# Patient Record
Sex: FEMALE | Race: WHITE | Employment: OTHER | URBAN - METROPOLITAN AREA
[De-identification: names, ages, dates, MRNs, and addresses within clinical notes are randomized per-mention and may not be internally consistent; named-entity substitution may affect disease eponyms.]

---

## 2017-04-20 ENCOUNTER — HOSPITAL ENCOUNTER (OUTPATIENT)
Dept: GENERAL RADIOLOGY | Age: 77
Discharge: HOME | End: 2017-04-20
Attending: INTERNAL MEDICINE | Admitting: INTERNAL MEDICINE

## 2017-04-20 DIAGNOSIS — E78.5 ELEVATED LIPIDS: ICD-10-CM

## 2017-04-20 DIAGNOSIS — I10 ESSENTIAL HYPERTENSION: ICD-10-CM

## 2017-04-20 DIAGNOSIS — E03.9 HYPOTHYROIDISM, UNSPECIFIED TYPE: ICD-10-CM

## 2017-04-20 LAB
A/G RATIO: 1.4 (ref 1.1–2.2)
ALBUMIN SERPL-MCNC: 3.9 G/DL (ref 3.4–5)
ALP BLD-CCNC: 91 U/L (ref 40–129)
ALT SERPL-CCNC: 39 U/L (ref 10–40)
ANION GAP SERPL CALCULATED.3IONS-SCNC: 17 MMOL/L (ref 3–16)
AST SERPL-CCNC: 41 U/L (ref 15–37)
BILIRUB SERPL-MCNC: 0.6 MG/DL (ref 0–1)
BUN BLDV-MCNC: 27 MG/DL (ref 7–20)
CALCIUM SERPL-MCNC: 9.5 MG/DL (ref 8.3–10.6)
CHLORIDE BLD-SCNC: 102 MMOL/L (ref 99–110)
CHOLESTEROL, TOTAL: 134 MG/DL (ref 0–199)
CO2: 22 MMOL/L (ref 21–32)
CREAT SERPL-MCNC: 0.8 MG/DL (ref 0.6–1.2)
GFR AFRICAN AMERICAN: >60
GFR NON-AFRICAN AMERICAN: >60
GLOBULIN: 2.8 G/DL
GLUCOSE BLD-MCNC: 106 MG/DL (ref 70–99)
HDLC SERPL-MCNC: 44 MG/DL (ref 40–60)
LDL CHOLESTEROL CALCULATED: 49 MG/DL
POTASSIUM SERPL-SCNC: 4.9 MMOL/L (ref 3.5–5.1)
SODIUM BLD-SCNC: 141 MMOL/L (ref 136–145)
T4 FREE: 1.1 NG/DL (ref 0.9–1.8)
TOTAL PROTEIN: 6.7 G/DL (ref 6.4–8.2)
TRIGL SERPL-MCNC: 205 MG/DL (ref 0–150)
TSH SERPL DL<=0.05 MIU/L-ACNC: 1.12 UIU/ML (ref 0.27–4.2)
VLDLC SERPL CALC-MCNC: 41 MG/DL

## 2017-04-24 ENCOUNTER — OFFICE VISIT (OUTPATIENT)
Dept: INTERNAL MEDICINE CLINIC | Age: 77
End: 2017-04-24

## 2017-04-24 VITALS
HEIGHT: 63 IN | BODY MASS INDEX: 33.66 KG/M2 | OXYGEN SATURATION: 96 % | SYSTOLIC BLOOD PRESSURE: 132 MMHG | WEIGHT: 190 LBS | HEART RATE: 58 BPM | DIASTOLIC BLOOD PRESSURE: 72 MMHG

## 2017-04-24 DIAGNOSIS — E78.5 ELEVATED LIPIDS: ICD-10-CM

## 2017-04-24 DIAGNOSIS — E03.9 HYPOTHYROIDISM, UNSPECIFIED TYPE: ICD-10-CM

## 2017-04-24 DIAGNOSIS — R73.01 IFG (IMPAIRED FASTING GLUCOSE): ICD-10-CM

## 2017-04-24 DIAGNOSIS — R09.89 CAROTID BRUIT, UNSPECIFIED LATERALITY: ICD-10-CM

## 2017-04-24 DIAGNOSIS — I10 ESSENTIAL HYPERTENSION: Primary | ICD-10-CM

## 2017-04-24 PROCEDURE — 1036F TOBACCO NON-USER: CPT | Performed by: INTERNAL MEDICINE

## 2017-04-24 PROCEDURE — 99214 OFFICE O/P EST MOD 30 MIN: CPT | Performed by: INTERNAL MEDICINE

## 2017-04-24 PROCEDURE — G8417 CALC BMI ABV UP PARAM F/U: HCPCS | Performed by: INTERNAL MEDICINE

## 2017-04-24 PROCEDURE — 4040F PNEUMOC VAC/ADMIN/RCVD: CPT | Performed by: INTERNAL MEDICINE

## 2017-04-24 PROCEDURE — 1090F PRES/ABSN URINE INCON ASSESS: CPT | Performed by: INTERNAL MEDICINE

## 2017-04-24 PROCEDURE — G8427 DOCREV CUR MEDS BY ELIG CLIN: HCPCS | Performed by: INTERNAL MEDICINE

## 2017-04-24 PROCEDURE — G8399 PT W/DXA RESULTS DOCUMENT: HCPCS | Performed by: INTERNAL MEDICINE

## 2017-04-24 PROCEDURE — 1123F ACP DISCUSS/DSCN MKR DOCD: CPT | Performed by: INTERNAL MEDICINE

## 2017-04-27 ENCOUNTER — HOSPITAL ENCOUNTER (OUTPATIENT)
Dept: VASCULAR LAB | Age: 77
Discharge: HOME | End: 2017-04-27
Attending: INTERNAL MEDICINE | Admitting: INTERNAL MEDICINE

## 2017-04-27 DIAGNOSIS — R09.89 OTHER SPECIFIED SYMPTOMS AND SIGNS INVOLVING THE CIRCULATORY AND RESPIRATORY SYSTEMS: ICD-10-CM

## 2017-05-15 RX ORDER — IRBESARTAN 300 MG/1
300 TABLET ORAL DAILY
Qty: 90 TABLET | Refills: 3 | Status: SHIPPED | OUTPATIENT
Start: 2017-05-15 | End: 2018-05-25 | Stop reason: SDUPTHER

## 2017-08-15 ENCOUNTER — TELEPHONE (OUTPATIENT)
Dept: INTERNAL MEDICINE CLINIC | Age: 77
End: 2017-08-15

## 2017-08-15 RX ORDER — METOPROLOL SUCCINATE 50 MG/1
50 TABLET, EXTENDED RELEASE ORAL DAILY
Qty: 30 TABLET | Refills: 5 | Status: SHIPPED | OUTPATIENT
Start: 2017-08-15 | End: 2018-02-13 | Stop reason: SDUPTHER

## 2017-08-23 ENCOUNTER — HOSPITAL ENCOUNTER (OUTPATIENT)
Dept: MAMMOGRAPHY | Age: 77
Discharge: HOME | End: 2017-08-23
Attending: INTERNAL MEDICINE | Admitting: INTERNAL MEDICINE

## 2017-08-23 DIAGNOSIS — Z12.31 ENCOUNTER FOR SCREENING MAMMOGRAM FOR BREAST CANCER: ICD-10-CM

## 2017-08-31 ENCOUNTER — HOSPITAL ENCOUNTER (OUTPATIENT)
Dept: MAMMOGRAPHY | Age: 77
Discharge: HOME | End: 2017-08-31
Attending: INTERNAL MEDICINE | Admitting: INTERNAL MEDICINE

## 2017-08-31 ENCOUNTER — TELEPHONE (OUTPATIENT)
Dept: INTERNAL MEDICINE CLINIC | Age: 77
End: 2017-08-31

## 2017-08-31 DIAGNOSIS — R92.8 ABNORMAL MAMMOGRAM: Primary | ICD-10-CM

## 2017-08-31 DIAGNOSIS — R92.8 ABNORMAL MAMMOGRAM: ICD-10-CM

## 2017-08-31 DIAGNOSIS — R92.8 ABNORMAL MAMMOGRAM, UNSPECIFIED: ICD-10-CM

## 2017-09-08 ENCOUNTER — HOSPITAL ENCOUNTER (OUTPATIENT)
Dept: MAMMOGRAPHY | Age: 77
Discharge: HOME | End: 2017-09-08
Attending: INTERNAL MEDICINE | Admitting: INTERNAL MEDICINE

## 2017-09-08 ENCOUNTER — NURSE ONLY (OUTPATIENT)
Dept: MAMMOGRAPHY | Age: 77
End: 2017-09-08

## 2017-09-08 DIAGNOSIS — R92.8 ABNORMAL MAMMOGRAM, UNSPECIFIED: ICD-10-CM

## 2017-09-11 ENCOUNTER — TELEPHONE (OUTPATIENT)
Dept: INTERNAL MEDICINE CLINIC | Age: 77
End: 2017-09-11

## 2017-09-22 LAB
ANION GAP SERPL CALCULATED.3IONS-SCNC: 17 MMOL/L (ref 3–16)
BUN BLDV-MCNC: 30 MG/DL (ref 7–20)
CALCIUM SERPL-MCNC: 9.3 MG/DL (ref 8.3–10.6)
CHLORIDE BLD-SCNC: 107 MMOL/L (ref 99–110)
CO2: 19 MMOL/L (ref 21–32)
CREAT SERPL-MCNC: 0.9 MG/DL (ref 0.6–1.2)
GFR AFRICAN AMERICAN: >60
GFR NON-AFRICAN AMERICAN: >60
GLUCOSE BLD-MCNC: 107 MG/DL (ref 70–99)
POTASSIUM SERPL-SCNC: 4.7 MMOL/L (ref 3.5–5.1)
SODIUM BLD-SCNC: 143 MMOL/L (ref 136–145)

## 2017-09-23 LAB
EKG ATRIAL RATE: 70 BPM
EKG DIAGNOSIS: NORMAL
EKG P AXIS: -5 DEGREES
EKG P-R INTERVAL: 152 MS
EKG Q-T INTERVAL: 382 MS
EKG QRS DURATION: 90 MS
EKG QTC CALCULATION (BAZETT): 412 MS
EKG R AXIS: 0 DEGREES
EKG T AXIS: 14 DEGREES
EKG VENTRICULAR RATE: 70 BPM

## 2017-09-23 PROCEDURE — 93010 ELECTROCARDIOGRAM REPORT: CPT | Performed by: INTERNAL MEDICINE

## 2017-09-27 ENCOUNTER — HOSPITAL ENCOUNTER (OUTPATIENT)
Dept: MAMMOGRAPHY | Age: 77
Discharge: HOME | End: 2017-09-27
Attending: SURGERY | Admitting: SURGERY

## 2017-09-27 VITALS
SYSTOLIC BLOOD PRESSURE: 126 MMHG | RESPIRATION RATE: 10 BRPM | OXYGEN SATURATION: 97 % | WEIGHT: 185 LBS | DIASTOLIC BLOOD PRESSURE: 108 MMHG | TEMPERATURE: 96.9 F | BODY MASS INDEX: 32.78 KG/M2 | HEIGHT: 63 IN | HEART RATE: 64 BPM

## 2017-09-27 DIAGNOSIS — C50.911 INVASIVE DUCTAL CARCINOMA OF BREAST, RIGHT (HCC): ICD-10-CM

## 2017-09-27 RX ORDER — CLINDAMYCIN HYDROCHLORIDE 150 MG/1
150 CAPSULE ORAL 3 TIMES DAILY
Qty: 30 CAPSULE | Refills: 0 | OUTPATIENT
Start: 2017-09-27 | End: 2017-10-07

## 2017-09-27 RX ORDER — DIPHENHYDRAMINE HYDROCHLORIDE 50 MG/ML
12.5 INJECTION INTRAMUSCULAR; INTRAVENOUS
Status: DISCONTINUED | OUTPATIENT
Start: 2017-09-27 | End: 2017-09-27 | Stop reason: ALTCHOICE

## 2017-09-27 RX ORDER — MEPERIDINE HYDROCHLORIDE 25 MG/ML
12.5 INJECTION INTRAMUSCULAR; INTRAVENOUS; SUBCUTANEOUS EVERY 5 MIN PRN
Status: DISCONTINUED | OUTPATIENT
Start: 2017-09-27 | End: 2017-09-27 | Stop reason: ALTCHOICE

## 2017-09-27 RX ORDER — MORPHINE SULFATE 2 MG/ML
1 INJECTION, SOLUTION INTRAMUSCULAR; INTRAVENOUS EVERY 5 MIN PRN
Status: DISCONTINUED | OUTPATIENT
Start: 2017-09-27 | End: 2017-09-27 | Stop reason: ALTCHOICE

## 2017-09-27 RX ORDER — OXYCODONE HYDROCHLORIDE AND ACETAMINOPHEN 5; 325 MG/1; MG/1
TABLET ORAL
Status: DISCONTINUED
Start: 2017-09-27 | End: 2017-09-27 | Stop reason: ALTCHOICE

## 2017-09-27 RX ORDER — MORPHINE SULFATE 2 MG/ML
2 INJECTION, SOLUTION INTRAMUSCULAR; INTRAVENOUS EVERY 5 MIN PRN
Status: DISCONTINUED | OUTPATIENT
Start: 2017-09-27 | End: 2017-09-27 | Stop reason: ALTCHOICE

## 2017-09-27 RX ORDER — ONDANSETRON 2 MG/ML
4 INJECTION INTRAMUSCULAR; INTRAVENOUS
Status: DISCONTINUED | OUTPATIENT
Start: 2017-09-27 | End: 2017-09-27 | Stop reason: ALTCHOICE

## 2017-09-27 RX ORDER — SODIUM CHLORIDE 0.9 % (FLUSH) 0.9 %
10 SYRINGE (ML) INJECTION PRN
Status: DISCONTINUED | OUTPATIENT
Start: 2017-09-27 | End: 2017-09-27 | Stop reason: ALTCHOICE

## 2017-09-27 RX ORDER — OXYCODONE HYDROCHLORIDE AND ACETAMINOPHEN 5; 325 MG/1; MG/1
1 TABLET ORAL PRN
Status: COMPLETED | OUTPATIENT
Start: 2017-09-27 | End: 2017-09-27

## 2017-09-27 RX ORDER — HYDRALAZINE HYDROCHLORIDE 20 MG/ML
5 INJECTION INTRAMUSCULAR; INTRAVENOUS EVERY 10 MIN PRN
Status: DISCONTINUED | OUTPATIENT
Start: 2017-09-27 | End: 2017-09-27 | Stop reason: ALTCHOICE

## 2017-09-27 RX ORDER — CLINDAMYCIN HYDROCHLORIDE 150 MG/1
150 CAPSULE ORAL 3 TIMES DAILY
Qty: 30 CAPSULE | Refills: 0 | Status: SHIPPED | OUTPATIENT
Start: 2017-09-27 | End: 2017-10-07

## 2017-09-27 RX ORDER — OXYCODONE HYDROCHLORIDE AND ACETAMINOPHEN 5; 325 MG/1; MG/1
2 TABLET ORAL PRN
Status: COMPLETED | OUTPATIENT
Start: 2017-09-27 | End: 2017-09-27

## 2017-09-27 RX ORDER — OXYCODONE HYDROCHLORIDE AND ACETAMINOPHEN 5; 325 MG/1; MG/1
1 TABLET ORAL EVERY 4 HOURS PRN
Qty: 30 TABLET | Refills: 0 | Status: SHIPPED | OUTPATIENT
Start: 2017-09-27 | End: 2017-10-04

## 2017-09-27 RX ORDER — SODIUM CHLORIDE 0.9 % (FLUSH) 0.9 %
10 SYRINGE (ML) INJECTION EVERY 12 HOURS SCHEDULED
Status: DISCONTINUED | OUTPATIENT
Start: 2017-09-27 | End: 2017-09-27 | Stop reason: ALTCHOICE

## 2017-09-27 RX ORDER — SODIUM CHLORIDE, SODIUM LACTATE, POTASSIUM CHLORIDE, CALCIUM CHLORIDE 600; 310; 30; 20 MG/100ML; MG/100ML; MG/100ML; MG/100ML
INJECTION, SOLUTION INTRAVENOUS CONTINUOUS
Status: DISCONTINUED | OUTPATIENT
Start: 2017-09-27 | End: 2017-09-27 | Stop reason: ALTCHOICE

## 2017-09-27 RX ORDER — PROMETHAZINE HYDROCHLORIDE 25 MG/ML
6.25 INJECTION, SOLUTION INTRAMUSCULAR; INTRAVENOUS
Status: DISCONTINUED | OUTPATIENT
Start: 2017-09-27 | End: 2017-09-27 | Stop reason: ALTCHOICE

## 2017-09-27 RX ORDER — OXYCODONE HYDROCHLORIDE AND ACETAMINOPHEN 5; 325 MG/1; MG/1
1 TABLET ORAL EVERY 4 HOURS PRN
Qty: 30 TABLET | Refills: 0 | OUTPATIENT
Start: 2017-09-27 | End: 2017-10-04

## 2017-09-27 RX ORDER — LIDOCAINE HYDROCHLORIDE 10 MG/ML
0.3 INJECTION, SOLUTION EPIDURAL; INFILTRATION; INTRACAUDAL; PERINEURAL
Status: DISCONTINUED | OUTPATIENT
Start: 2017-09-27 | End: 2017-09-27 | Stop reason: ALTCHOICE

## 2017-09-27 RX ADMIN — OXYCODONE HYDROCHLORIDE AND ACETAMINOPHEN 1 TABLET: 5; 325 TABLET ORAL at 15:57

## 2017-09-27 ASSESSMENT — PAIN SCALES - GENERAL
PAINLEVEL_OUTOF10: 4
PAINLEVEL_OUTOF10: 0

## 2017-09-27 ASSESSMENT — PAIN - FUNCTIONAL ASSESSMENT: PAIN_FUNCTIONAL_ASSESSMENT: 0-10

## 2017-10-19 ENCOUNTER — HOSPITAL ENCOUNTER (OUTPATIENT)
Dept: GENERAL RADIOLOGY | Age: 77
Discharge: HOME | End: 2017-10-19
Attending: INTERNAL MEDICINE | Admitting: INTERNAL MEDICINE

## 2017-10-19 DIAGNOSIS — E78.5 ELEVATED LIPIDS: ICD-10-CM

## 2017-10-19 DIAGNOSIS — I10 ESSENTIAL HYPERTENSION: ICD-10-CM

## 2017-10-19 LAB
A/G RATIO: 1.3 (ref 1.1–2.2)
ALBUMIN SERPL-MCNC: 3.8 G/DL (ref 3.4–5)
ALP BLD-CCNC: 105 U/L (ref 40–129)
ALT SERPL-CCNC: 31 U/L (ref 10–40)
ANION GAP SERPL CALCULATED.3IONS-SCNC: 17 MMOL/L (ref 3–16)
AST SERPL-CCNC: 31 U/L (ref 15–37)
BILIRUB SERPL-MCNC: 0.7 MG/DL (ref 0–1)
BUN BLDV-MCNC: 24 MG/DL (ref 7–20)
CALCIUM SERPL-MCNC: 9.5 MG/DL (ref 8.3–10.6)
CHLORIDE BLD-SCNC: 104 MMOL/L (ref 99–110)
CHOLESTEROL, TOTAL: 114 MG/DL (ref 0–199)
CO2: 20 MMOL/L (ref 21–32)
CREAT SERPL-MCNC: 0.7 MG/DL (ref 0.6–1.2)
GFR AFRICAN AMERICAN: >60
GFR NON-AFRICAN AMERICAN: >60
GLOBULIN: 2.9 G/DL
GLUCOSE BLD-MCNC: 104 MG/DL (ref 70–99)
HDLC SERPL-MCNC: 45 MG/DL (ref 40–60)
LDL CHOLESTEROL CALCULATED: 43 MG/DL
POTASSIUM SERPL-SCNC: 4.2 MMOL/L (ref 3.5–5.1)
SODIUM BLD-SCNC: 141 MMOL/L (ref 136–145)
TOTAL PROTEIN: 6.7 G/DL (ref 6.4–8.2)
TRIGL SERPL-MCNC: 132 MG/DL (ref 0–150)
VLDLC SERPL CALC-MCNC: 26 MG/DL

## 2017-10-24 ENCOUNTER — OFFICE VISIT (OUTPATIENT)
Dept: INTERNAL MEDICINE CLINIC | Age: 77
End: 2017-10-24

## 2017-10-24 VITALS
HEART RATE: 74 BPM | HEIGHT: 63 IN | WEIGHT: 184 LBS | DIASTOLIC BLOOD PRESSURE: 60 MMHG | SYSTOLIC BLOOD PRESSURE: 126 MMHG | OXYGEN SATURATION: 96 % | BODY MASS INDEX: 32.6 KG/M2

## 2017-10-24 DIAGNOSIS — E03.9 HYPOTHYROIDISM, UNSPECIFIED TYPE: ICD-10-CM

## 2017-10-24 DIAGNOSIS — M51.36 DDD (DEGENERATIVE DISC DISEASE), LUMBAR: ICD-10-CM

## 2017-10-24 DIAGNOSIS — E78.5 ELEVATED LIPIDS: ICD-10-CM

## 2017-10-24 DIAGNOSIS — R73.01 IFG (IMPAIRED FASTING GLUCOSE): ICD-10-CM

## 2017-10-24 DIAGNOSIS — I10 ESSENTIAL HYPERTENSION: Primary | ICD-10-CM

## 2017-10-24 DIAGNOSIS — Z23 NEED FOR IMMUNIZATION AGAINST INFLUENZA: ICD-10-CM

## 2017-10-24 PROCEDURE — 1123F ACP DISCUSS/DSCN MKR DOCD: CPT | Performed by: INTERNAL MEDICINE

## 2017-10-24 PROCEDURE — 99214 OFFICE O/P EST MOD 30 MIN: CPT | Performed by: INTERNAL MEDICINE

## 2017-10-24 PROCEDURE — G8484 FLU IMMUNIZE NO ADMIN: HCPCS | Performed by: INTERNAL MEDICINE

## 2017-10-24 PROCEDURE — G8427 DOCREV CUR MEDS BY ELIG CLIN: HCPCS | Performed by: INTERNAL MEDICINE

## 2017-10-24 PROCEDURE — G0008 ADMIN INFLUENZA VIRUS VAC: HCPCS | Performed by: INTERNAL MEDICINE

## 2017-10-24 PROCEDURE — G8399 PT W/DXA RESULTS DOCUMENT: HCPCS | Performed by: INTERNAL MEDICINE

## 2017-10-24 PROCEDURE — 90662 IIV NO PRSV INCREASED AG IM: CPT | Performed by: INTERNAL MEDICINE

## 2017-10-24 PROCEDURE — 4040F PNEUMOC VAC/ADMIN/RCVD: CPT | Performed by: INTERNAL MEDICINE

## 2017-10-24 PROCEDURE — 1036F TOBACCO NON-USER: CPT | Performed by: INTERNAL MEDICINE

## 2017-10-24 PROCEDURE — G8417 CALC BMI ABV UP PARAM F/U: HCPCS | Performed by: INTERNAL MEDICINE

## 2017-10-24 PROCEDURE — 1090F PRES/ABSN URINE INCON ASSESS: CPT | Performed by: INTERNAL MEDICINE

## 2017-10-24 ASSESSMENT — PATIENT HEALTH QUESTIONNAIRE - PHQ9
1. LITTLE INTEREST OR PLEASURE IN DOING THINGS: 0
SUM OF ALL RESPONSES TO PHQ QUESTIONS 1-9: 0
SUM OF ALL RESPONSES TO PHQ9 QUESTIONS 1 & 2: 0
2. FEELING DOWN, DEPRESSED OR HOPELESS: 0

## 2017-10-24 NOTE — PROGRESS NOTES
Subjective:      Patient ID: Jackie Robertson is a 76 y.o. female. Cc: HTN. Breast cancer. HPI: Reviewed last office visit 4/24/2017 at which time noted gr of right eye vision but only for seconds. Has been seen by ophthalmology. Workup: 4/27/2017: Carotid ultrasound: Moderate plaque R ICA less than 50%. Minimal plaque LICA less than 68%. 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 Surgery performed by Dr. Zamzam Guzman. Patient to see oncology Dr. Phillips Shown this Friday. Patient complains of dry mouth especially in the morning. Has been on Ditropan for her bladder. Obesity with 6 pound weight loss since last office visit. Medicines and allergies reviewed  Health maintenance reviewed  Reviewed laboratory data 10/19/2017: Chemistry panel: BUN: 24.  Glucose: 104. Lipid panel: Total cholesterol: 114. LDL cholesterol: 43.    Review of Systems    Review of Systems   Constitutional: negative   HENT: negative   EYES: negative   Respiratory: negative   Gastrointestinal: negative   Endocrine: negative   Musculoskeletal: negative   Skin: negative   Allergic/Immunological: negative   Hematological: negative   Psychiatric/Behavorial: negative   CV: negative   CNS: negative   :Negative   S/E:Negative  Renal: Negative      Objective:   Physical Exam: Lungs: Clear to auscultation. CV: S1-S2 normal.  KATHERINE. Carotid: No bruit. Head/neck: Neck: No lymphadenopathy. Thyroid: Not palpable. Eyes: EOMI, PERRLA without nystagmus. Skin:  Right axillary area surgical scar/incision well-healed. No mass palpated. Spine/extremities: +1 ankle edema bilaterally. CNS:Patient's alert, cooperative, moves all 4 limbs, ambulates without difficulty, no slurred speech, no facial droop, good orientation. Good historian. Blood pressure 126/60, pulse 74, height 5' 3\" (1.6 m), weight 184 lb (83.5 kg), SpO2 96 %. Assessment:      1. Status post right breast lumpectomy as noted above.   2. HTN: Stable  3. IF  4. Lipids: Stable  5. Obesity: Modest weight loss  6. PVD with carotid plaque is noted above   7. Dry mouth: May well be from Ditropan         Plan:     1. Patient to see oncology Dr. Maxime Wall this Friday  2. Follow-up with urology concerning dry mouth and Ditropan  3. Discussed low sugar, low carb, weight reduction diet  4. Gave slip to obtain fasting laboratory studies before next office visit  5.   Influenza vaccine given today after discussing risks and benefits  I spent greater than 25 minutes with this patient face-to-face with greater than 50% involved counseling and care coordination as noted above  Return follow-up  Dr. Diaz Tan

## 2017-10-24 NOTE — PROGRESS NOTES
Vaccine Information Sheet, \"Influenza - Inactivated\"  given to Christianne Fernandez, or parent/legal guardian of  Christianne Fernandez and verbalized understanding. Patient responses:    Have you ever had a reaction to a flu vaccine? No  Are you able to eat eggs without adverse effects? Yes  Do you have any current illness? No  Have you ever had Guillian Albion Syndrome? No    Flu vaccine given per order. Please see immunization tab.

## 2017-12-14 RX ORDER — MELOXICAM 7.5 MG/1
TABLET ORAL
Qty: 90 TABLET | Refills: 0 | Status: SHIPPED | OUTPATIENT
Start: 2017-12-14 | End: 2018-03-18 | Stop reason: SDUPTHER

## 2017-12-14 NOTE — TELEPHONE ENCOUNTER
Refill request for MELOXICAM medication.      Name of Pharmacy- [address] [place] [address]      Last visit - 10/24/17     Pending visit - 4/24/18    Last refill -9/18/17      Medication Contract signed -   Last Oarrs ran-         Additional Comments

## 2017-12-20 ENCOUNTER — HOSPITAL ENCOUNTER (OUTPATIENT)
Dept: GENERAL RADIOLOGY | Age: 77
Discharge: HOME | End: 2017-12-20
Attending: INTERNAL MEDICINE | Admitting: INTERNAL MEDICINE

## 2017-12-20 DIAGNOSIS — N95.1 FEMALE CLIMACTERIC STATE: ICD-10-CM

## 2017-12-20 DIAGNOSIS — N95.1 MENOPAUSAL SYMPTOMS: ICD-10-CM

## 2018-02-14 ENCOUNTER — TELEPHONE (OUTPATIENT)
Dept: INTERNAL MEDICINE CLINIC | Age: 78
End: 2018-02-14

## 2018-02-14 RX ORDER — METOPROLOL SUCCINATE 50 MG/1
50 TABLET, EXTENDED RELEASE ORAL DAILY
Qty: 30 TABLET | Refills: 0 | Status: SHIPPED | OUTPATIENT
Start: 2018-02-14 | End: 2018-02-14 | Stop reason: SDUPTHER

## 2018-02-14 RX ORDER — METOPROLOL SUCCINATE 50 MG/1
TABLET, EXTENDED RELEASE ORAL
Qty: 90 TABLET | Refills: 0 | Status: SHIPPED | OUTPATIENT
Start: 2018-02-14 | End: 2018-05-16 | Stop reason: SDUPTHER

## 2018-03-29 ENCOUNTER — OFFICE VISIT (OUTPATIENT)
Dept: ORTHOPEDIC SURGERY | Age: 78
End: 2018-03-29

## 2018-03-29 DIAGNOSIS — M25.551 HIP PAIN, RIGHT: Primary | ICD-10-CM

## 2018-03-29 DIAGNOSIS — S76.011A STRAIN OF FLEXOR MUSCLE OF RIGHT HIP, INITIAL ENCOUNTER: ICD-10-CM

## 2018-03-29 PROCEDURE — 99213 OFFICE O/P EST LOW 20 MIN: CPT | Performed by: NURSE PRACTITIONER

## 2018-03-30 NOTE — PROGRESS NOTES
Subjective    Patient ID: Jag Lewis is a 76 y.o..  female. Chief Complaint   Patient presents with    Pain     R Hip       Hip Pain  Patient complains of right hip pain. The patient started with hip pain on Saturday evening. There was no specific mechanism of injury. She went to the ED on [facility] where she was diagnosed with a muscle strain and told to follow up with [facility]. She was given a prescription for Percocet and Flexeril which she reports is not helping. She points to the right buttock and back side as well as into the right groin as the source of her pain. She denies any lower back pain. She denies any numbness or tingling. She denies any recent fevers or chills. She did have a total right hip replacement in 2002 with Dr. Tegan Zamudio. Pain Assessment  Location of Pain: Pelvis  Location Modifiers: Right  Severity of Pain: 10  Quality of Pain: Throbbing, Dull, Aching  Frequency of Pain: Intermittent  Date Pain First Started: 03/24/18  Aggravating Factors: Exercise  Limiting Behavior: Yes  Relieving Factors: Ice    Patient's medications, allergies, past medical, surgical, social and family histories were reviewed and updated as appropriate. Physical Exam:   Constitutional:  Pt well groomed, no acute distress, well developed, no obvious deformities  There were no vitals filed for this visit.  -Oriented to person, place, and time  -mood and affect are appropriate     Hip exam - right hip exam shows;    I was unable to examine this patient due to the fact that she refused to participate and get on the table which was needed to examine her. She was able to stand and stated that it was difficult for her to lift her leg up however she was unable to show me.     Contralateral Exam:  -No obvious deformities  -No abrasions or cellulitis noted, NVI   -Full ROM   -No joint laxity  -no palpable tenderness noted    Neurological exam:   -Deep tendon reflexes are 2/4 at the knees and 2/4 at the ankles with strong extensor hallicus longus motor strength bilaterally. Skin exam:  There is not any cellulitis, lymphedema or cutaneous lesions noted in the lower extremities. Xray:  2 view (AP/Frog leg) of right hip show:  No acute fractures or deformities; hardware in place with no signs of loosening    Assessment: right hip flexor strain    Plan: The patient was instructed to continue taking Flexeril as needed as well as to start taking Aleve or Advil. She may continue taking Percocet as needed. She is encouraged to try heat over her back side as well as in the front. She will follow up with Dr. Forest Colby. No orders of the defined types were placed in this encounter.

## 2018-04-25 ENCOUNTER — HOSPITAL ENCOUNTER (OUTPATIENT)
Dept: MAMMOGRAPHY | Age: 78
Discharge: HOME | End: 2018-04-25
Attending: SURGERY | Admitting: SURGERY

## 2018-04-25 DIAGNOSIS — Z12.31 ENCOUNTER FOR SCREENING MAMMOGRAM FOR BREAST CANCER: ICD-10-CM

## 2018-05-01 ENCOUNTER — HOSPITAL ENCOUNTER (OUTPATIENT)
Dept: GENERAL RADIOLOGY | Age: 78
Discharge: HOME | End: 2018-05-01
Attending: INTERNAL MEDICINE | Admitting: INTERNAL MEDICINE

## 2018-05-01 DIAGNOSIS — E03.9 HYPOTHYROIDISM, UNSPECIFIED TYPE: ICD-10-CM

## 2018-05-01 DIAGNOSIS — I10 ESSENTIAL HYPERTENSION: ICD-10-CM

## 2018-05-01 DIAGNOSIS — E78.5 ELEVATED LIPIDS: ICD-10-CM

## 2018-05-01 LAB
A/G RATIO: 1.5 (ref 1.1–2.2)
ALBUMIN SERPL-MCNC: 4.1 G/DL (ref 3.4–5)
ALP BLD-CCNC: 125 U/L (ref 40–129)
ALT SERPL-CCNC: 48 U/L (ref 10–40)
ANION GAP SERPL CALCULATED.3IONS-SCNC: 20 MMOL/L (ref 3–16)
AST SERPL-CCNC: 58 U/L (ref 15–37)
BILIRUB SERPL-MCNC: 0.7 MG/DL (ref 0–1)
BUN BLDV-MCNC: 16 MG/DL (ref 7–20)
CALCIUM SERPL-MCNC: 10 MG/DL (ref 8.3–10.6)
CHLORIDE BLD-SCNC: 103 MMOL/L (ref 99–110)
CHOLESTEROL, TOTAL: 139 MG/DL (ref 0–199)
CO2: 22 MMOL/L (ref 21–32)
CREAT SERPL-MCNC: 0.7 MG/DL (ref 0.6–1.2)
GFR AFRICAN AMERICAN: >60
GFR NON-AFRICAN AMERICAN: >60
GLOBULIN: 2.8 G/DL
GLUCOSE BLD-MCNC: 99 MG/DL (ref 70–99)
HDLC SERPL-MCNC: 48 MG/DL (ref 40–60)
LDL CHOLESTEROL CALCULATED: 60 MG/DL
POTASSIUM SERPL-SCNC: 4.3 MMOL/L (ref 3.5–5.1)
SODIUM BLD-SCNC: 145 MMOL/L (ref 136–145)
T4 FREE: 1.6 NG/DL (ref 0.9–1.8)
TOTAL PROTEIN: 6.9 G/DL (ref 6.4–8.2)
TRIGL SERPL-MCNC: 157 MG/DL (ref 0–150)
TSH SERPL DL<=0.05 MIU/L-ACNC: 0.05 UIU/ML (ref 0.27–4.2)
VLDLC SERPL CALC-MCNC: 31 MG/DL

## 2018-05-03 ENCOUNTER — OFFICE VISIT (OUTPATIENT)
Dept: INTERNAL MEDICINE CLINIC | Age: 78
End: 2018-05-03

## 2018-05-03 VITALS
SYSTOLIC BLOOD PRESSURE: 138 MMHG | DIASTOLIC BLOOD PRESSURE: 68 MMHG | WEIGHT: 186 LBS | HEART RATE: 88 BPM | BODY MASS INDEX: 32.95 KG/M2 | OXYGEN SATURATION: 97 %

## 2018-05-03 DIAGNOSIS — R73.01 IFG (IMPAIRED FASTING GLUCOSE): ICD-10-CM

## 2018-05-03 DIAGNOSIS — S00.83XA CONTUSION OF FACE, INITIAL ENCOUNTER: ICD-10-CM

## 2018-05-03 DIAGNOSIS — I10 ESSENTIAL HYPERTENSION: ICD-10-CM

## 2018-05-03 DIAGNOSIS — E78.5 ELEVATED LIPIDS: ICD-10-CM

## 2018-05-03 DIAGNOSIS — R60.9 EDEMA, UNSPECIFIED TYPE: ICD-10-CM

## 2018-05-03 DIAGNOSIS — T14.8XXA SKIN ABRASION: Primary | ICD-10-CM

## 2018-05-03 DIAGNOSIS — E03.9 HYPOTHYROIDISM, UNSPECIFIED TYPE: ICD-10-CM

## 2018-05-03 DIAGNOSIS — R79.89 ELEVATED LFTS: ICD-10-CM
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
## 2018-05-03 PROCEDURE — G8399 PT W/DXA RESULTS DOCUMENT: HCPCS | Performed by: INTERNAL MEDICINE

## 2018-05-03 PROCEDURE — G8417 CALC BMI ABV UP PARAM F/U: HCPCS | Performed by: INTERNAL MEDICINE

## 2018-05-03 PROCEDURE — 1123F ACP DISCUSS/DSCN MKR DOCD: CPT | Performed by: INTERNAL MEDICINE

## 2018-05-03 PROCEDURE — 1036F TOBACCO NON-USER: CPT | Performed by: INTERNAL MEDICINE

## 2018-05-03 PROCEDURE — 4040F PNEUMOC VAC/ADMIN/RCVD: CPT | Performed by: INTERNAL MEDICINE

## 2018-05-03 PROCEDURE — G8427 DOCREV CUR MEDS BY ELIG CLIN: HCPCS | Performed by: INTERNAL MEDICINE

## 2018-05-03 PROCEDURE — 99214 OFFICE O/P EST MOD 30 MIN: CPT | Performed by: INTERNAL MEDICINE

## 2018-05-03 PROCEDURE — 1090F PRES/ABSN URINE INCON ASSESS: CPT | Performed by: INTERNAL MEDICINE

## 2018-05-03 RX ORDER — NAPROXEN SODIUM 220 MG
220 TABLET ORAL 2 TIMES DAILY WITH MEALS
COMMUNITY
End: 2020-03-02 | Stop reason: CLARIF

## 2018-05-03 RX ORDER — FUROSEMIDE 20 MG/1
20 TABLET ORAL DAILY
Qty: 5 TABLET | Refills: 0 | Status: ON HOLD | OUTPATIENT
Start: 2018-05-03 | End: 2019-11-12

## 2018-05-16 RX ORDER — METOPROLOL SUCCINATE 50 MG/1
TABLET, EXTENDED RELEASE ORAL
Qty: 90 TABLET | Refills: 0 | Status: SHIPPED | OUTPATIENT
Start: 2018-05-16 | End: 2018-08-13 | Stop reason: SDUPTHER

## 2018-06-28 ENCOUNTER — HOSPITAL ENCOUNTER (OUTPATIENT)
Dept: GENERAL RADIOLOGY | Age: 78
Discharge: HOME | End: 2018-06-28
Attending: INTERNAL MEDICINE | Admitting: INTERNAL MEDICINE

## 2018-06-28 DIAGNOSIS — I10 ESSENTIAL HYPERTENSION: ICD-10-CM

## 2018-06-28 LAB
A/G RATIO: 1.4 (ref 1.1–2.2)
ALBUMIN SERPL-MCNC: 4 G/DL (ref 3.4–5)
ALP BLD-CCNC: 92 U/L (ref 40–129)
ALT SERPL-CCNC: 34 U/L (ref 10–40)
ANION GAP SERPL CALCULATED.3IONS-SCNC: 15 MMOL/L (ref 3–16)
AST SERPL-CCNC: 34 U/L (ref 15–37)
BILIRUB SERPL-MCNC: 0.6 MG/DL (ref 0–1)
BUN BLDV-MCNC: 30 MG/DL (ref 7–20)
CALCIUM SERPL-MCNC: 10.2 MG/DL (ref 8.3–10.6)
CHLORIDE BLD-SCNC: 100 MMOL/L (ref 99–110)
CO2: 23 MMOL/L (ref 21–32)
CREAT SERPL-MCNC: 0.8 MG/DL (ref 0.6–1.2)
GFR AFRICAN AMERICAN: >60
GFR NON-AFRICAN AMERICAN: >60
GLOBULIN: 2.8 G/DL
GLUCOSE BLD-MCNC: 108 MG/DL (ref 70–99)
POTASSIUM SERPL-SCNC: 4.4 MMOL/L (ref 3.5–5.1)
SODIUM BLD-SCNC: 138 MMOL/L (ref 136–145)
TOTAL PROTEIN: 6.8 G/DL (ref 6.4–8.2)

## 2018-07-02 ENCOUNTER — OFFICE VISIT (OUTPATIENT)
Dept: INTERNAL MEDICINE CLINIC | Age: 78
End: 2018-07-02

## 2018-07-02 ENCOUNTER — TELEPHONE (OUTPATIENT)
Dept: INTERNAL MEDICINE CLINIC | Age: 78
End: 2018-07-02

## 2018-07-02 VITALS
SYSTOLIC BLOOD PRESSURE: 116 MMHG | DIASTOLIC BLOOD PRESSURE: 60 MMHG | OXYGEN SATURATION: 98 % | HEART RATE: 75 BPM | WEIGHT: 182 LBS | BODY MASS INDEX: 32.24 KG/M2

## 2018-07-02 DIAGNOSIS — E78.5 ELEVATED LIPIDS: ICD-10-CM

## 2018-07-02 DIAGNOSIS — E03.9 HYPOTHYROIDISM, UNSPECIFIED TYPE: ICD-10-CM

## 2018-07-02 DIAGNOSIS — R60.9 PERIPHERAL EDEMA: ICD-10-CM

## 2018-07-02 DIAGNOSIS — I10 ESSENTIAL HYPERTENSION: Primary | ICD-10-CM

## 2018-07-02 PROBLEM — R60.0 PERIPHERAL EDEMA: Status: ACTIVE | Noted: 2018-07-02

## 2018-07-02 PROCEDURE — G8427 DOCREV CUR MEDS BY ELIG CLIN: HCPCS | Performed by: INTERNAL MEDICINE

## 2018-07-02 PROCEDURE — G8417 CALC BMI ABV UP PARAM F/U: HCPCS | Performed by: INTERNAL MEDICINE

## 2018-07-02 PROCEDURE — 99213 OFFICE O/P EST LOW 20 MIN: CPT | Performed by: INTERNAL MEDICINE

## 2018-07-02 PROCEDURE — 4040F PNEUMOC VAC/ADMIN/RCVD: CPT | Performed by: INTERNAL MEDICINE

## 2018-07-02 PROCEDURE — 1123F ACP DISCUSS/DSCN MKR DOCD: CPT | Performed by: INTERNAL MEDICINE

## 2018-07-02 PROCEDURE — 1090F PRES/ABSN URINE INCON ASSESS: CPT | Performed by: INTERNAL MEDICINE

## 2018-07-02 PROCEDURE — G8399 PT W/DXA RESULTS DOCUMENT: HCPCS | Performed by: INTERNAL MEDICINE

## 2018-07-02 PROCEDURE — 1036F TOBACCO NON-USER: CPT | Performed by: INTERNAL MEDICINE

## 2018-07-02 RX ORDER — CLONIDINE HYDROCHLORIDE 0.3 MG/1
TABLET ORAL
Qty: 1 TABLET | Refills: 0 | Status: SHIPPED | OUTPATIENT
Start: 2018-07-02 | End: 2018-08-13 | Stop reason: SDUPTHER

## 2018-07-02 NOTE — TELEPHONE ENCOUNTER
Brennan called on Clonidine 0.3 mg. 1/2 tab daily. 1 ordered. Is this correct?  Call them at [phone]

## 2018-07-03 ENCOUNTER — TELEPHONE (OUTPATIENT)
Dept: INTERNAL MEDICINE CLINIC | Age: 78
End: 2018-07-03

## 2018-08-13 ENCOUNTER — OFFICE VISIT (OUTPATIENT)
Dept: INTERNAL MEDICINE CLINIC | Age: 78
End: 2018-08-13

## 2018-08-13 VITALS
OXYGEN SATURATION: 96 % | WEIGHT: 180 LBS | BODY MASS INDEX: 31.89 KG/M2 | HEART RATE: 85 BPM | SYSTOLIC BLOOD PRESSURE: 110 MMHG | DIASTOLIC BLOOD PRESSURE: 62 MMHG

## 2018-08-13 DIAGNOSIS — E03.9 HYPOTHYROIDISM, UNSPECIFIED TYPE: ICD-10-CM

## 2018-08-13 DIAGNOSIS — E78.5 ELEVATED LIPIDS: ICD-10-CM

## 2018-08-13 DIAGNOSIS — I10 ESSENTIAL HYPERTENSION: Primary | ICD-10-CM

## 2018-08-13 DIAGNOSIS — R73.01 IFG (IMPAIRED FASTING GLUCOSE): ICD-10-CM

## 2018-08-13 DIAGNOSIS — R60.9 EDEMA, UNSPECIFIED TYPE: ICD-10-CM

## 2018-08-13 PROCEDURE — G8417 CALC BMI ABV UP PARAM F/U: HCPCS | Performed by: INTERNAL MEDICINE

## 2018-08-13 PROCEDURE — 99213 OFFICE O/P EST LOW 20 MIN: CPT | Performed by: INTERNAL MEDICINE

## 2018-08-13 PROCEDURE — G8399 PT W/DXA RESULTS DOCUMENT: HCPCS | Performed by: INTERNAL MEDICINE

## 2018-08-13 PROCEDURE — 1036F TOBACCO NON-USER: CPT | Performed by: INTERNAL MEDICINE

## 2018-08-13 PROCEDURE — 1123F ACP DISCUSS/DSCN MKR DOCD: CPT | Performed by: INTERNAL MEDICINE

## 2018-08-13 PROCEDURE — 4040F PNEUMOC VAC/ADMIN/RCVD: CPT | Performed by: INTERNAL MEDICINE

## 2018-08-13 PROCEDURE — G8427 DOCREV CUR MEDS BY ELIG CLIN: HCPCS | Performed by: INTERNAL MEDICINE

## 2018-08-13 PROCEDURE — 1101F PT FALLS ASSESS-DOCD LE1/YR: CPT | Performed by: INTERNAL MEDICINE

## 2018-08-13 PROCEDURE — 1090F PRES/ABSN URINE INCON ASSESS: CPT | Performed by: INTERNAL MEDICINE

## 2018-08-13 RX ORDER — METOPROLOL SUCCINATE 50 MG/1
TABLET, EXTENDED RELEASE ORAL
Qty: 90 TABLET | Refills: 3 | Status: SHIPPED | OUTPATIENT
Start: 2018-08-13 | End: 2019-08-11 | Stop reason: SDUPTHER

## 2018-08-13 RX ORDER — CLONIDINE HYDROCHLORIDE 0.1 MG/1
TABLET ORAL
Qty: 30 TABLET | Refills: 2 | Status: SHIPPED | OUTPATIENT
Start: 2018-08-13 | End: 2018-08-13 | Stop reason: SDUPTHER

## 2018-08-13 NOTE — PROGRESS NOTES
Subjective:      Patient ID: Debo Faith is a [de-identified] y.o. female. CC: HTN  HPI: Patient with HTN, peripheral edema, IFG, osteoarthritis. Reviewed last office visit 7/2/2018 notable for much peripheral edema. At that time stopped Mobic and decreased clonidine just take 1/2 pill a day. She notes improvement but still persistence of lower extremity edema. Weight down 2 pounds since last office visit. Complains of osteoarthritis symptoms/problems off Mobic and wishes to restart the medicine. She notes especially left knee pain. Medicines and allergies reviewed  Health maintenance reviewed  Family history reviewed  Social history reviewed  Reviewed laboratory data 6/28/2018: Chemistry panel: Glucose: 108. Creatinine: 0.8. Review of Systems    Review of Systems   Constitutional: negative   HENT: negative   EYES: negative   Respiratory: negative   Gastrointestinal: negative   Endocrine: negative   Musculoskeletal:  Osteoarthritis left knee. Peripheral edema lower extremity. Skin: negative   Allergic/Immunological: negative   Hematological: negative   Psychiatric/Behavorial: negative   CV: negative   CNS: negative   :Negative   S/E:Negative  Renal: Negative      Objective:   Physical Exam  : Lungs: Clear to auscultation. No wheezing. CV: S1-S2 normal.  KATHERINE. Carotid: Good upstroke no bruit. Head/neck: Neck: No lymphadenopathy. Thyroid: Not palpable and nontender to touch. Eyes: EOMI, PERRLA without nystagmus. Spine/extremities: +2 ankle and anterior tibial edema bilaterally. Skin: No open areas. Blood pressure 110/62, pulse 85, weight 180 lb (81.6 kg), SpO2 96 %. Assessment:      1. HTN: Stable  2. Peripheral edema: Some improvement  3. Osteoarthritis worse off Mobic  3. IFG         Plan:       1. Decrease clonidine 0.1 mg take oral 1/2 pill a day  2. Restart Mobic for arthritis  3. Monitor blood pressure readings at home call if systolic blood pressure greater than 135  4.   Monitor peripheral edema  5.   Gave slip to obtain laboratory study before next office visit  Return follow-up  Dr. Silvia Hidalgo MD

## 2018-08-23 RX ORDER — IRBESARTAN 300 MG/1
300 TABLET ORAL DAILY
Qty: 90 TABLET | Refills: 0 | Status: SHIPPED | OUTPATIENT
Start: 2018-08-23 | End: 2018-11-22 | Stop reason: SDUPTHER

## 2018-11-13 ENCOUNTER — HOSPITAL ENCOUNTER (OUTPATIENT)
Age: 78
Discharge: HOME | End: 2018-11-13
Payer: MEDICARE

## 2018-11-13 DIAGNOSIS — E78.5 ELEVATED LIPIDS: ICD-10-CM

## 2018-11-13 DIAGNOSIS — I10 ESSENTIAL HYPERTENSION: ICD-10-CM

## 2018-11-13 LAB
A/G RATIO: 1.4 (ref 1.1–2.2)
ALBUMIN SERPL-MCNC: 4.2 G/DL (ref 3.4–5)
ALP BLD-CCNC: 104 U/L (ref 40–129)
ALT SERPL-CCNC: 55 U/L (ref 10–40)
ANION GAP SERPL CALCULATED.3IONS-SCNC: 14 MMOL/L (ref 3–16)
AST SERPL-CCNC: 57 U/L (ref 15–37)
BILIRUB SERPL-MCNC: 0.9 MG/DL (ref 0–1)
BUN BLDV-MCNC: 22 MG/DL (ref 7–20)
CALCIUM SERPL-MCNC: 10.2 MG/DL (ref 8.3–10.6)
CHLORIDE BLD-SCNC: 101 MMOL/L (ref 99–110)
CHOLESTEROL, TOTAL: 148 MG/DL (ref 0–199)
CO2: 26 MMOL/L (ref 21–32)
CREAT SERPL-MCNC: 0.7 MG/DL (ref 0.6–1.2)
GFR AFRICAN AMERICAN: >60
GFR NON-AFRICAN AMERICAN: >60
GLOBULIN: 2.9 G/DL
GLUCOSE BLD-MCNC: 99 MG/DL (ref 70–99)
HDLC SERPL-MCNC: 51 MG/DL (ref 40–60)
LDL CHOLESTEROL CALCULATED: 68 MG/DL
POTASSIUM SERPL-SCNC: 4.5 MMOL/L (ref 3.5–5.1)
SODIUM BLD-SCNC: 141 MMOL/L (ref 136–145)
TOTAL PROTEIN: 7.1 G/DL (ref 6.4–8.2)
TRIGL SERPL-MCNC: 147 MG/DL (ref 0–150)
VLDLC SERPL CALC-MCNC: 29 MG/DL

## 2018-11-13 PROCEDURE — 80061 LIPID PANEL: CPT

## 2018-11-13 PROCEDURE — 36415 COLL VENOUS BLD VENIPUNCTURE: CPT

## 2018-11-13 PROCEDURE — 80053 COMPREHEN METABOLIC PANEL: CPT

## 2019-01-14 ENCOUNTER — OFFICE VISIT (OUTPATIENT)
Dept: INTERNAL MEDICINE CLINIC | Age: 79
End: 2019-01-14
Payer: MEDICARE

## 2019-01-14 VITALS
SYSTOLIC BLOOD PRESSURE: 128 MMHG | OXYGEN SATURATION: 99 % | DIASTOLIC BLOOD PRESSURE: 62 MMHG | BODY MASS INDEX: 31.18 KG/M2 | HEART RATE: 82 BPM | WEIGHT: 176 LBS

## 2019-01-14 DIAGNOSIS — E78.5 ELEVATED LIPIDS: ICD-10-CM

## 2019-01-14 DIAGNOSIS — K76.0 FATTY LIVER: ICD-10-CM

## 2019-01-14 DIAGNOSIS — Z23 NEED FOR PROPHYLACTIC VACCINATION AND INOCULATION AGAINST INFLUENZA: ICD-10-CM

## 2019-01-14 DIAGNOSIS — I10 ESSENTIAL HYPERTENSION: Primary | ICD-10-CM

## 2019-01-14 DIAGNOSIS — E03.9 HYPOTHYROIDISM, UNSPECIFIED TYPE: ICD-10-CM

## 2019-01-14 DIAGNOSIS — R73.01 IFG (IMPAIRED FASTING GLUCOSE): ICD-10-CM

## 2019-01-14 DIAGNOSIS — R79.89 ELEVATED LFTS: ICD-10-CM

## 2019-01-14 DIAGNOSIS — R60.9 PERIPHERAL EDEMA: ICD-10-CM

## 2019-01-14 PROCEDURE — G8399 PT W/DXA RESULTS DOCUMENT: HCPCS | Performed by: INTERNAL MEDICINE

## 2019-01-14 PROCEDURE — 99213 OFFICE O/P EST LOW 20 MIN: CPT | Performed by: INTERNAL MEDICINE

## 2019-01-14 PROCEDURE — G8427 DOCREV CUR MEDS BY ELIG CLIN: HCPCS | Performed by: INTERNAL MEDICINE

## 2019-01-14 PROCEDURE — 1123F ACP DISCUSS/DSCN MKR DOCD: CPT | Performed by: INTERNAL MEDICINE

## 2019-01-14 PROCEDURE — 1090F PRES/ABSN URINE INCON ASSESS: CPT | Performed by: INTERNAL MEDICINE

## 2019-01-14 PROCEDURE — 1036F TOBACCO NON-USER: CPT | Performed by: INTERNAL MEDICINE

## 2019-01-14 PROCEDURE — G8482 FLU IMMUNIZE ORDER/ADMIN: HCPCS | Performed by: INTERNAL MEDICINE

## 2019-01-14 PROCEDURE — 90662 IIV NO PRSV INCREASED AG IM: CPT | Performed by: INTERNAL MEDICINE

## 2019-01-14 PROCEDURE — 4040F PNEUMOC VAC/ADMIN/RCVD: CPT | Performed by: INTERNAL MEDICINE

## 2019-01-14 PROCEDURE — 1101F PT FALLS ASSESS-DOCD LE1/YR: CPT | Performed by: INTERNAL MEDICINE

## 2019-01-14 PROCEDURE — G0008 ADMIN INFLUENZA VIRUS VAC: HCPCS | Performed by: INTERNAL MEDICINE

## 2019-01-14 PROCEDURE — G8417 CALC BMI ABV UP PARAM F/U: HCPCS | Performed by: INTERNAL MEDICINE

## 2019-01-14 RX ORDER — CLONIDINE HYDROCHLORIDE 0.1 MG/1
TABLET ORAL
Qty: 45 TABLET | Refills: 3 | Status: SHIPPED | OUTPATIENT
Start: 2019-01-14 | End: 2020-01-27

## 2019-01-14 ASSESSMENT — PATIENT HEALTH QUESTIONNAIRE - PHQ9
1. LITTLE INTEREST OR PLEASURE IN DOING THINGS: 0
2. FEELING DOWN, DEPRESSED OR HOPELESS: 0
SUM OF ALL RESPONSES TO PHQ QUESTIONS 1-9: 0
SUM OF ALL RESPONSES TO PHQ QUESTIONS 1-9: 0
SUM OF ALL RESPONSES TO PHQ9 QUESTIONS 1 & 2: 0

## 2019-01-16 RX ORDER — LEVOTHYROXINE SODIUM 0.12 MG/1
TABLET ORAL
Qty: 90 TABLET | Refills: 0 | Status: SHIPPED | OUTPATIENT
Start: 2019-01-16 | End: 2019-04-13 | Stop reason: SDUPTHER

## 2019-04-26 ENCOUNTER — HOSPITAL ENCOUNTER (OUTPATIENT)
Dept: WOMENS IMAGING | Age: 79
Discharge: HOME | End: 2019-04-26
Payer: MEDICARE

## 2019-04-26 DIAGNOSIS — Z12.31 VISIT FOR SCREENING MAMMOGRAM: ICD-10-CM

## 2019-05-06 RX ORDER — MELOXICAM 7.5 MG/1
TABLET ORAL
Qty: 90 TABLET | Refills: 0 | Status: SHIPPED | OUTPATIENT
Start: 2019-05-06 | End: 2019-07-31 | Stop reason: SDUPTHER

## 2019-05-06 RX ORDER — OXYBUTYNIN CHLORIDE 5 MG/1
TABLET ORAL
Qty: 180 TABLET | Refills: 0 | Status: SHIPPED | OUTPATIENT
Start: 2019-05-06 | End: 2020-01-16

## 2019-05-06 NOTE — TELEPHONE ENCOUNTER
Refill request for Oxybutynin medication.      Name of [facility]    Last visit - 1/14/19     Pending visit - 7/15/19    Last refill -6/15/17    Medication Contract signed -   Last Oarrs ran-     Additional Comments

## 2019-05-13 ENCOUNTER — TELEPHONE (OUTPATIENT)
Dept: INTERNAL MEDICINE CLINIC | Age: 79
End: 2019-05-13

## 2019-05-13 NOTE — TELEPHONE ENCOUNTER
[facility] needs order put in Epic for a Right Breast Ultra Sound  Guided Biopsy. Fax # [phone]. They can see in [facility].

## 2019-05-15 ENCOUNTER — HOSPITAL ENCOUNTER (OUTPATIENT)
Dept: WOMENS IMAGING | Age: 79
Discharge: HOME | End: 2019-05-15
Payer: MEDICARE

## 2019-05-15 DIAGNOSIS — C50.419 MALIGNANT NEOPLASM OF UPPER-OUTER QUADRANT OF FEMALE BREAST, UNSPECIFIED ESTROGEN RECEPTOR STATUS, UNSPECIFIED LATERALITY (HCC): ICD-10-CM

## 2019-05-15 PROCEDURE — G0279 TOMOSYNTHESIS, MAMMO: HCPCS

## 2019-06-20 RX ORDER — ATORVASTATIN CALCIUM 40 MG/1
TABLET, FILM COATED ORAL
Qty: 45 TABLET | Refills: 0 | Status: SHIPPED | OUTPATIENT
Start: 2019-06-20 | End: 2019-09-17 | Stop reason: SDUPTHER

## 2019-07-12 ENCOUNTER — HOSPITAL ENCOUNTER (OUTPATIENT)
Age: 79
Discharge: HOME | End: 2019-07-12
Payer: MEDICARE

## 2019-07-12 DIAGNOSIS — I10 ESSENTIAL HYPERTENSION: ICD-10-CM

## 2019-07-12 DIAGNOSIS — E78.5 ELEVATED LIPIDS: ICD-10-CM

## 2019-07-12 LAB
A/G RATIO: 1.5 (ref 1.1–2.2)
ALBUMIN SERPL-MCNC: 4.1 G/DL (ref 3.4–5)
ALP BLD-CCNC: 100 U/L (ref 40–129)
ALT SERPL-CCNC: 42 U/L (ref 10–40)
ANION GAP SERPL CALCULATED.3IONS-SCNC: 13 MMOL/L (ref 3–16)
AST SERPL-CCNC: 42 U/L (ref 15–37)
BILIRUB SERPL-MCNC: 0.8 MG/DL (ref 0–1)
BUN BLDV-MCNC: 23 MG/DL (ref 7–20)
CALCIUM SERPL-MCNC: 10.2 MG/DL (ref 8.3–10.6)
CHLORIDE BLD-SCNC: 105 MMOL/L (ref 99–110)
CHOLESTEROL, TOTAL: 124 MG/DL (ref 0–199)
CO2: 24 MMOL/L (ref 21–32)
CREAT SERPL-MCNC: 0.7 MG/DL (ref 0.6–1.2)
GFR AFRICAN AMERICAN: >60
GFR NON-AFRICAN AMERICAN: >60
GLOBULIN: 2.7 G/DL
GLUCOSE BLD-MCNC: 105 MG/DL (ref 70–99)
HDLC SERPL-MCNC: 51 MG/DL (ref 40–60)
LDL CHOLESTEROL CALCULATED: 45 MG/DL
POTASSIUM SERPL-SCNC: 4.7 MMOL/L (ref 3.5–5.1)
SODIUM BLD-SCNC: 142 MMOL/L (ref 136–145)
TOTAL PROTEIN: 6.8 G/DL (ref 6.4–8.2)
TRIGL SERPL-MCNC: 140 MG/DL (ref 0–150)
VLDLC SERPL CALC-MCNC: 28 MG/DL

## 2019-07-12 PROCEDURE — 80053 COMPREHEN METABOLIC PANEL: CPT

## 2019-07-12 PROCEDURE — 36415 COLL VENOUS BLD VENIPUNCTURE: CPT

## 2019-07-12 PROCEDURE — 80061 LIPID PANEL: CPT

## 2019-07-15 ENCOUNTER — OFFICE VISIT (OUTPATIENT)
Dept: INTERNAL MEDICINE CLINIC | Age: 79
End: 2019-07-15
Payer: MEDICARE

## 2019-07-15 VITALS
OXYGEN SATURATION: 98 % | DIASTOLIC BLOOD PRESSURE: 68 MMHG | WEIGHT: 179 LBS | HEART RATE: 85 BPM | SYSTOLIC BLOOD PRESSURE: 120 MMHG | BODY MASS INDEX: 31.71 KG/M2 | HEIGHT: 63 IN

## 2019-07-15 DIAGNOSIS — E78.41 ELEVATED LIPOPROTEIN(A): ICD-10-CM

## 2019-07-15 DIAGNOSIS — Z13.0 SCREENING FOR DEFICIENCY ANEMIA: ICD-10-CM

## 2019-07-15 DIAGNOSIS — E03.9 HYPOTHYROIDISM, UNSPECIFIED TYPE: ICD-10-CM

## 2019-07-15 DIAGNOSIS — G89.29 CHRONIC PAIN OF BOTH KNEES: ICD-10-CM

## 2019-07-15 DIAGNOSIS — M25.561 CHRONIC PAIN OF BOTH KNEES: ICD-10-CM

## 2019-07-15 DIAGNOSIS — M25.562 CHRONIC PAIN OF BOTH KNEES: ICD-10-CM

## 2019-07-15 DIAGNOSIS — R73.01 IFG (IMPAIRED FASTING GLUCOSE): ICD-10-CM

## 2019-07-15 DIAGNOSIS — I10 ESSENTIAL HYPERTENSION: Primary | ICD-10-CM

## 2019-07-15 PROCEDURE — G8399 PT W/DXA RESULTS DOCUMENT: HCPCS | Performed by: NURSE PRACTITIONER

## 2019-07-15 PROCEDURE — G8417 CALC BMI ABV UP PARAM F/U: HCPCS | Performed by: NURSE PRACTITIONER

## 2019-07-15 PROCEDURE — G8427 DOCREV CUR MEDS BY ELIG CLIN: HCPCS | Performed by: NURSE PRACTITIONER

## 2019-07-15 PROCEDURE — 1036F TOBACCO NON-USER: CPT | Performed by: NURSE PRACTITIONER

## 2019-07-15 PROCEDURE — 1090F PRES/ABSN URINE INCON ASSESS: CPT | Performed by: NURSE PRACTITIONER

## 2019-07-15 PROCEDURE — 4040F PNEUMOC VAC/ADMIN/RCVD: CPT | Performed by: NURSE PRACTITIONER

## 2019-07-15 PROCEDURE — 99214 OFFICE O/P EST MOD 30 MIN: CPT | Performed by: NURSE PRACTITIONER

## 2019-07-15 PROCEDURE — 1123F ACP DISCUSS/DSCN MKR DOCD: CPT | Performed by: NURSE PRACTITIONER

## 2019-07-15 ASSESSMENT — ENCOUNTER SYMPTOMS
SHORTNESS OF BREATH: 0
NAUSEA: 0
CHEST TIGHTNESS: 0
ABDOMINAL DISTENTION: 0
DIARRHEA: 0
VOMITING: 0
CONSTIPATION: 0

## 2019-07-15 NOTE — PROGRESS NOTES
Continue current regimen. Screening for deficiency anemia  -     CBC Auto Differential; Future    IFG (impaired fasting glucose)  -     COMPREHENSIVE METABOLIC PANEL; Future    Hypothyroidism, unspecified type  -     TSH with Reflex; Future    Elevated lipoprotein(a)  -     LIPID PANEL; Future    Chronic pain of both knees    Continue current regimen. Can consider glucosamine, readjustment of her NSAID/tylenol dosing. If worsening consider following up with Dr. Jazmin Pascual (injections?)    Return in about 6 months (around 1/15/2020), or if symptoms worsen or fail to improve. Patient should call the office immediately with new or ongoing signs or symptoms or worsening, or proceed to the emergency room. If you are onmedications which could impair your senses, you are at risk of weakness, falls, dizziness, or drowsiness. You should be careful during activities which could place you at risk of harm, such as climbing, using stairs,operating machinery, or driving vehicles. If you feel you cannot safely do these activities, you should request others to help you, or avoid the activities altogether. If you are drowsy for any other reason, you should usethe same precautions as listed above. Call if pattern of symptoms change or persists for an extended time.     Carlyn Moon, DIANC

## 2019-07-31 RX ORDER — MELOXICAM 7.5 MG/1
TABLET ORAL
Qty: 90 TABLET | Refills: 0 | Status: SHIPPED | OUTPATIENT
Start: 2019-07-31 | End: 2019-10-27 | Stop reason: SDUPTHER

## 2019-08-11 DIAGNOSIS — I10 ESSENTIAL HYPERTENSION: ICD-10-CM

## 2019-08-12 RX ORDER — METOPROLOL SUCCINATE 50 MG/1
TABLET, EXTENDED RELEASE ORAL
Qty: 90 TABLET | Refills: 0 | Status: SHIPPED | OUTPATIENT
Start: 2019-08-12 | End: 2019-11-06 | Stop reason: SDUPTHER

## 2019-09-04 RX ORDER — IRBESARTAN 300 MG/1
300 TABLET ORAL DAILY
Qty: 90 TABLET | Refills: 0 | Status: SHIPPED | OUTPATIENT
Start: 2019-09-04 | End: 2019-12-03 | Stop reason: SDUPTHER

## 2019-09-18 RX ORDER — ATORVASTATIN CALCIUM 40 MG/1
TABLET, FILM COATED ORAL
Qty: 45 TABLET | Refills: 0 | Status: SHIPPED | OUTPATIENT
Start: 2019-09-18 | End: 2019-12-16 | Stop reason: SDUPTHER

## 2019-10-14 ENCOUNTER — OFFICE VISIT (OUTPATIENT)
Dept: ORTHOPEDIC SURGERY | Age: 79
End: 2019-10-14
Payer: MEDICARE

## 2019-10-14 VITALS — BODY MASS INDEX: 29.37 KG/M2 | HEIGHT: 64 IN | RESPIRATION RATE: 16 BRPM | WEIGHT: 172 LBS

## 2019-10-14 DIAGNOSIS — R52 PAIN: Primary | ICD-10-CM

## 2019-10-14 DIAGNOSIS — M48.061 SPINAL STENOSIS OF LUMBAR REGION, UNSPECIFIED WHETHER NEUROGENIC CLAUDICATION PRESENT: ICD-10-CM

## 2019-10-14 DIAGNOSIS — Z96.643 HISTORY OF BILATERAL TOTAL HIP ARTHROPLASTY: ICD-10-CM

## 2019-10-14 PROCEDURE — G8399 PT W/DXA RESULTS DOCUMENT: HCPCS | Performed by: PHYSICIAN ASSISTANT

## 2019-10-14 PROCEDURE — 99214 OFFICE O/P EST MOD 30 MIN: CPT | Performed by: PHYSICIAN ASSISTANT

## 2019-10-14 PROCEDURE — G8484 FLU IMMUNIZE NO ADMIN: HCPCS | Performed by: PHYSICIAN ASSISTANT

## 2019-10-14 PROCEDURE — 1090F PRES/ABSN URINE INCON ASSESS: CPT | Performed by: PHYSICIAN ASSISTANT

## 2019-10-14 PROCEDURE — 4040F PNEUMOC VAC/ADMIN/RCVD: CPT | Performed by: PHYSICIAN ASSISTANT

## 2019-10-14 PROCEDURE — 1036F TOBACCO NON-USER: CPT | Performed by: PHYSICIAN ASSISTANT

## 2019-10-14 PROCEDURE — 1123F ACP DISCUSS/DSCN MKR DOCD: CPT | Performed by: PHYSICIAN ASSISTANT

## 2019-10-14 PROCEDURE — G8427 DOCREV CUR MEDS BY ELIG CLIN: HCPCS | Performed by: PHYSICIAN ASSISTANT

## 2019-10-14 PROCEDURE — G8417 CALC BMI ABV UP PARAM F/U: HCPCS | Performed by: PHYSICIAN ASSISTANT

## 2019-10-14 RX ORDER — METHYLPREDNISOLONE 4 MG/1
TABLET ORAL
Qty: 1 KIT | Refills: 0 | Status: ON HOLD | OUTPATIENT
Start: 2019-10-14 | End: 2019-11-12 | Stop reason: ALTCHOICE

## 2019-10-21 ENCOUNTER — OFFICE VISIT (OUTPATIENT)
Dept: ORTHOPEDIC SURGERY | Age: 79
End: 2019-10-21
Payer: MEDICARE

## 2019-10-21 VITALS
BODY MASS INDEX: 29.36 KG/M2 | WEIGHT: 171.96 LBS | HEART RATE: 69 BPM | HEIGHT: 64 IN | SYSTOLIC BLOOD PRESSURE: 141 MMHG | DIASTOLIC BLOOD PRESSURE: 74 MMHG

## 2019-10-21 DIAGNOSIS — M47.816 LUMBAR SPONDYLOSIS: ICD-10-CM

## 2019-10-21 DIAGNOSIS — M54.16 LUMBAR RADICULITIS: ICD-10-CM

## 2019-10-21 DIAGNOSIS — M51.36 DDD (DEGENERATIVE DISC DISEASE), LUMBAR: Primary | ICD-10-CM

## 2019-10-21 PROCEDURE — 1123F ACP DISCUSS/DSCN MKR DOCD: CPT | Performed by: PHYSICIAN ASSISTANT

## 2019-10-21 PROCEDURE — G8399 PT W/DXA RESULTS DOCUMENT: HCPCS | Performed by: PHYSICIAN ASSISTANT

## 2019-10-21 PROCEDURE — 1090F PRES/ABSN URINE INCON ASSESS: CPT | Performed by: PHYSICIAN ASSISTANT

## 2019-10-21 PROCEDURE — 1036F TOBACCO NON-USER: CPT | Performed by: PHYSICIAN ASSISTANT

## 2019-10-21 PROCEDURE — G8427 DOCREV CUR MEDS BY ELIG CLIN: HCPCS | Performed by: PHYSICIAN ASSISTANT

## 2019-10-21 PROCEDURE — 99213 OFFICE O/P EST LOW 20 MIN: CPT | Performed by: PHYSICIAN ASSISTANT

## 2019-10-21 PROCEDURE — G8417 CALC BMI ABV UP PARAM F/U: HCPCS | Performed by: PHYSICIAN ASSISTANT

## 2019-10-21 PROCEDURE — 4040F PNEUMOC VAC/ADMIN/RCVD: CPT | Performed by: PHYSICIAN ASSISTANT

## 2019-10-21 PROCEDURE — G8484 FLU IMMUNIZE NO ADMIN: HCPCS | Performed by: PHYSICIAN ASSISTANT

## 2019-10-21 RX ORDER — GABAPENTIN 100 MG/1
CAPSULE ORAL
Qty: 30 CAPSULE | Refills: 0 | Status: SHIPPED | OUTPATIENT
Start: 2019-10-21 | End: 2020-03-02

## 2019-10-21 RX ORDER — DIAZEPAM 5 MG/1
TABLET ORAL
Qty: 2 TABLET | Refills: 0 | Status: SHIPPED | OUTPATIENT
Start: 2019-10-21 | End: 2019-11-21

## 2019-10-23 ENCOUNTER — HOSPITAL ENCOUNTER (OUTPATIENT)
Dept: MRI IMAGING | Age: 79
Discharge: HOME | End: 2019-10-23
Payer: MEDICARE

## 2019-10-23 DIAGNOSIS — M47.816 LUMBAR SPONDYLOSIS: ICD-10-CM

## 2019-10-23 DIAGNOSIS — M51.36 DDD (DEGENERATIVE DISC DISEASE), LUMBAR: ICD-10-CM

## 2019-10-23 DIAGNOSIS — M54.16 LUMBAR RADICULITIS: ICD-10-CM

## 2019-10-23 PROCEDURE — 72148 MRI LUMBAR SPINE W/O DYE: CPT

## 2019-10-24 ENCOUNTER — OFFICE VISIT (OUTPATIENT)
Dept: ORTHOPEDIC SURGERY | Age: 79
End: 2019-10-24
Payer: MEDICARE

## 2019-10-24 VITALS — BODY MASS INDEX: 29.36 KG/M2 | WEIGHT: 171.96 LBS | HEIGHT: 64 IN

## 2019-10-24 DIAGNOSIS — Z96.643 HISTORY OF BILATERAL TOTAL HIP ARTHROPLASTY: Primary | ICD-10-CM

## 2019-10-24 PROCEDURE — 99213 OFFICE O/P EST LOW 20 MIN: CPT | Performed by: PHYSICIAN ASSISTANT

## 2019-10-24 PROCEDURE — 1090F PRES/ABSN URINE INCON ASSESS: CPT | Performed by: PHYSICIAN ASSISTANT

## 2019-10-24 PROCEDURE — G8417 CALC BMI ABV UP PARAM F/U: HCPCS | Performed by: PHYSICIAN ASSISTANT

## 2019-10-24 PROCEDURE — G8427 DOCREV CUR MEDS BY ELIG CLIN: HCPCS | Performed by: PHYSICIAN ASSISTANT

## 2019-10-24 PROCEDURE — 1036F TOBACCO NON-USER: CPT | Performed by: PHYSICIAN ASSISTANT

## 2019-10-24 PROCEDURE — 1123F ACP DISCUSS/DSCN MKR DOCD: CPT | Performed by: PHYSICIAN ASSISTANT

## 2019-10-24 PROCEDURE — G8484 FLU IMMUNIZE NO ADMIN: HCPCS | Performed by: PHYSICIAN ASSISTANT

## 2019-10-24 PROCEDURE — G8399 PT W/DXA RESULTS DOCUMENT: HCPCS | Performed by: PHYSICIAN ASSISTANT

## 2019-10-24 PROCEDURE — 4040F PNEUMOC VAC/ADMIN/RCVD: CPT | Performed by: PHYSICIAN ASSISTANT

## 2019-10-27 RX ORDER — MELOXICAM 7.5 MG/1
TABLET ORAL
Qty: 90 TABLET | Refills: 0 | Status: SHIPPED | OUTPATIENT
Start: 2019-10-27 | End: 2020-01-27

## 2019-10-31 ENCOUNTER — OFFICE VISIT (OUTPATIENT)
Dept: ORTHOPEDIC SURGERY | Age: 79
End: 2019-10-31
Payer: MEDICARE

## 2019-10-31 VITALS
DIASTOLIC BLOOD PRESSURE: 62 MMHG | WEIGHT: 171.96 LBS | SYSTOLIC BLOOD PRESSURE: 110 MMHG | HEART RATE: 106 BPM | HEIGHT: 64 IN | BODY MASS INDEX: 29.36 KG/M2

## 2019-10-31 DIAGNOSIS — M51.36 DDD (DEGENERATIVE DISC DISEASE), LUMBAR: Primary | ICD-10-CM

## 2019-10-31 DIAGNOSIS — M54.16 LUMBAR RADICULITIS: ICD-10-CM

## 2019-10-31 DIAGNOSIS — M48.062 LUMBAR STENOSIS WITH NEUROGENIC CLAUDICATION: ICD-10-CM

## 2019-10-31 PROCEDURE — 4040F PNEUMOC VAC/ADMIN/RCVD: CPT | Performed by: PHYSICIAN ASSISTANT

## 2019-10-31 PROCEDURE — G8417 CALC BMI ABV UP PARAM F/U: HCPCS | Performed by: PHYSICIAN ASSISTANT

## 2019-10-31 PROCEDURE — G8399 PT W/DXA RESULTS DOCUMENT: HCPCS | Performed by: PHYSICIAN ASSISTANT

## 2019-10-31 PROCEDURE — 1036F TOBACCO NON-USER: CPT | Performed by: PHYSICIAN ASSISTANT

## 2019-10-31 PROCEDURE — G8484 FLU IMMUNIZE NO ADMIN: HCPCS | Performed by: PHYSICIAN ASSISTANT

## 2019-10-31 PROCEDURE — 1123F ACP DISCUSS/DSCN MKR DOCD: CPT | Performed by: PHYSICIAN ASSISTANT

## 2019-10-31 PROCEDURE — G8428 CUR MEDS NOT DOCUMENT: HCPCS | Performed by: PHYSICIAN ASSISTANT

## 2019-10-31 PROCEDURE — 99214 OFFICE O/P EST MOD 30 MIN: CPT | Performed by: PHYSICIAN ASSISTANT

## 2019-10-31 PROCEDURE — 1090F PRES/ABSN URINE INCON ASSESS: CPT | Performed by: PHYSICIAN ASSISTANT

## 2019-11-07 ENCOUNTER — TELEPHONE (OUTPATIENT)
Dept: ORTHOPEDIC SURGERY | Age: 79
End: 2019-11-07

## 2019-11-11 NOTE — H&P
tablet TK 1 T PO QD 2/9/18   Historical Provider, MD   levothyroxine (SYNTHROID) 125 MCG tablet TAKE 1 TABLET BY MOUTH DAILY FOR THYROID 1/9/18   Donavan Pitts MD   levothyroxine (SYNTHROID) 125 MCG tablet TAKE 1 TABLET BY MOUTH DAILY FOR THYROID 4/4/16   Donavan Pitts MD   multivitamin [facility]) per tablet Take 1 tablet by mouth daily. Historical Provider, MD   aspirin 81 MG tablet Take 81 mg by mouth three times a week. Historical Provider, MD   Acetaminophen (TYLENOL 8 HOUR PO) Take 625 mg by mouth 3 times daily     Historical Provider, MD       Vitals: Stable     PHYSICAL EXAM:  HENT: Airway patent and reviewed  Cardiovascular: Normal rate, regular rhythm, normal heart sounds. Pulmonary/Chest: No wheezes. No rhonchi. No rales. Abdominal: Soft. Bowel sounds are normal. No distension. MALLAMPATI:           []   I. Complete visualization of the soft palate           [x]   II. Complete visualization of the uvula            []   III. Visualization of only the base of the uvula           []   IV. Soft palate is not visible     ASA CLASS:         []   I. Normal, healthy adult           [x]   II.  Mild systemic disease            []   III. Severe systemic disease      Sedation plan:   [x]  Local              []  Minimal                  []  General anesthesia    Patient's condition acceptable for planned procedure/sedation. Post Procedure Plan   Return to same level of care   ______________________     The risks and benefits as well as alternatives to the procedure have been discussed with the patient and or family. The patient and or next of kin understands and agrees to proceed.     Dionisio Vences M.D.

## 2019-11-12 ENCOUNTER — HOSPITAL ENCOUNTER (OUTPATIENT)
Age: 79
Setting detail: OUTPATIENT SURGERY
Discharge: HOME | End: 2019-11-12
Attending: PHYSICAL MEDICINE & REHABILITATION | Admitting: PHYSICAL MEDICINE & REHABILITATION
Payer: MEDICARE

## 2019-11-12 VITALS
DIASTOLIC BLOOD PRESSURE: 61 MMHG | RESPIRATION RATE: 18 BRPM | TEMPERATURE: 96.6 F | HEIGHT: 64 IN | BODY MASS INDEX: 29.02 KG/M2 | WEIGHT: 170 LBS | HEART RATE: 71 BPM | SYSTOLIC BLOOD PRESSURE: 117 MMHG | OXYGEN SATURATION: 100 %

## 2019-11-12 PROCEDURE — 6360000004 HC RX CONTRAST MEDICATION: Performed by: PHYSICAL MEDICINE & REHABILITATION

## 2019-11-12 PROCEDURE — 3600000002 HC SURGERY LEVEL 2 BASE: Performed by: PHYSICAL MEDICINE & REHABILITATION

## 2019-11-12 PROCEDURE — 3600000012 HC SURGERY LEVEL 2 ADDTL 15MIN: Performed by: PHYSICAL MEDICINE & REHABILITATION

## 2019-11-12 PROCEDURE — 6360000002 HC RX W HCPCS: Performed by: PHYSICAL MEDICINE & REHABILITATION

## 2019-11-12 PROCEDURE — 2709999900 HC NON-CHARGEABLE SUPPLY: Performed by: PHYSICAL MEDICINE & REHABILITATION

## 2019-11-12 PROCEDURE — 2500000003 HC RX 250 WO HCPCS: Performed by: PHYSICAL MEDICINE & REHABILITATION

## 2019-11-12 PROCEDURE — 7100000010 HC PHASE II RECOVERY - FIRST 15 MIN: Performed by: PHYSICAL MEDICINE & REHABILITATION

## 2019-11-12 RX ORDER — M-VIT,TX,IRON,MINS/CALC/FOLIC 27MG-0.4MG
1 TABLET ORAL DAILY
COMMUNITY

## 2019-11-12 RX ORDER — LIDOCAINE HYDROCHLORIDE 10 MG/ML
INJECTION, SOLUTION EPIDURAL; INFILTRATION; INTRACAUDAL; PERINEURAL PRN
Status: DISCONTINUED | OUTPATIENT
Start: 2019-11-12 | End: 2019-11-12 | Stop reason: ALTCHOICE

## 2019-11-12 ASSESSMENT — PAIN SCALES - GENERAL: PAINLEVEL_OUTOF10: 0

## 2019-11-12 ASSESSMENT — PAIN - FUNCTIONAL ASSESSMENT
PAIN_FUNCTIONAL_ASSESSMENT: PREVENTS OR INTERFERES WITH ALL ACTIVE AND SOME PASSIVE ACTIVITIES
PAIN_FUNCTIONAL_ASSESSMENT: 0-10

## 2019-11-13 PROBLEM — R52 PAIN: Status: RESOLVED | Noted: 2019-10-14 | Resolved: 2019-11-13

## 2019-12-02 ENCOUNTER — OFFICE VISIT (OUTPATIENT)
Dept: ORTHOPEDIC SURGERY | Age: 79
End: 2019-12-02
Payer: MEDICARE

## 2019-12-02 VITALS — HEIGHT: 64 IN | BODY MASS INDEX: 29.02 KG/M2 | WEIGHT: 169.97 LBS

## 2019-12-02 DIAGNOSIS — M54.16 LUMBAR RADICULITIS: Primary | ICD-10-CM

## 2019-12-02 PROCEDURE — 4040F PNEUMOC VAC/ADMIN/RCVD: CPT | Performed by: PHYSICAL MEDICINE & REHABILITATION

## 2019-12-02 PROCEDURE — 99213 OFFICE O/P EST LOW 20 MIN: CPT | Performed by: PHYSICAL MEDICINE & REHABILITATION

## 2019-12-02 PROCEDURE — G8427 DOCREV CUR MEDS BY ELIG CLIN: HCPCS | Performed by: PHYSICAL MEDICINE & REHABILITATION

## 2019-12-02 PROCEDURE — G8399 PT W/DXA RESULTS DOCUMENT: HCPCS | Performed by: PHYSICAL MEDICINE & REHABILITATION

## 2019-12-02 PROCEDURE — G8417 CALC BMI ABV UP PARAM F/U: HCPCS | Performed by: PHYSICAL MEDICINE & REHABILITATION

## 2019-12-02 PROCEDURE — 1036F TOBACCO NON-USER: CPT | Performed by: PHYSICAL MEDICINE & REHABILITATION

## 2019-12-02 PROCEDURE — 1090F PRES/ABSN URINE INCON ASSESS: CPT | Performed by: PHYSICAL MEDICINE & REHABILITATION

## 2019-12-02 PROCEDURE — 1123F ACP DISCUSS/DSCN MKR DOCD: CPT | Performed by: PHYSICAL MEDICINE & REHABILITATION

## 2019-12-02 PROCEDURE — G8484 FLU IMMUNIZE NO ADMIN: HCPCS | Performed by: PHYSICAL MEDICINE & REHABILITATION

## 2019-12-03 RX ORDER — IRBESARTAN 300 MG/1
300 TABLET ORAL DAILY
Qty: 90 TABLET | Refills: 2 | Status: SHIPPED | OUTPATIENT
Start: 2019-12-03 | End: 2020-06-05

## 2019-12-04 ENCOUNTER — TELEPHONE (OUTPATIENT)
Dept: ORTHOPEDIC SURGERY | Age: 79
End: 2019-12-04

## 2019-12-11 ENCOUNTER — OFFICE VISIT (OUTPATIENT)
Dept: INTERNAL MEDICINE CLINIC | Age: 79
End: 2019-12-11
Payer: MEDICARE

## 2019-12-11 VITALS
TEMPERATURE: 98.6 F | OXYGEN SATURATION: 99 % | WEIGHT: 176 LBS | SYSTOLIC BLOOD PRESSURE: 118 MMHG | DIASTOLIC BLOOD PRESSURE: 80 MMHG | RESPIRATION RATE: 16 BRPM | BODY MASS INDEX: 30.68 KG/M2 | HEART RATE: 93 BPM

## 2019-12-11 DIAGNOSIS — L08.9 TRAUMATIC OPEN WOUND OF LEFT LOWER LEG WITH INFECTION, SUBSEQUENT ENCOUNTER: Primary | ICD-10-CM

## 2019-12-11 DIAGNOSIS — S81.802D TRAUMATIC OPEN WOUND OF LEFT LOWER LEG WITH INFECTION, SUBSEQUENT ENCOUNTER: Primary | ICD-10-CM

## 2019-12-11 DIAGNOSIS — S51.012A SKIN TEAR OF LEFT ELBOW WITHOUT COMPLICATION, INITIAL ENCOUNTER: ICD-10-CM

## 2019-12-11 DIAGNOSIS — I10 ESSENTIAL HYPERTENSION: ICD-10-CM

## 2019-12-11 PROCEDURE — G0008 ADMIN INFLUENZA VIRUS VAC: HCPCS | Performed by: NURSE PRACTITIONER

## 2019-12-11 PROCEDURE — 3288F FALL RISK ASSESSMENT DOCD: CPT | Performed by: NURSE PRACTITIONER

## 2019-12-11 PROCEDURE — 1036F TOBACCO NON-USER: CPT | Performed by: NURSE PRACTITIONER

## 2019-12-11 PROCEDURE — 4040F PNEUMOC VAC/ADMIN/RCVD: CPT | Performed by: NURSE PRACTITIONER

## 2019-12-11 PROCEDURE — 1123F ACP DISCUSS/DSCN MKR DOCD: CPT | Performed by: NURSE PRACTITIONER

## 2019-12-11 PROCEDURE — 1090F PRES/ABSN URINE INCON ASSESS: CPT | Performed by: NURSE PRACTITIONER

## 2019-12-11 PROCEDURE — G8399 PT W/DXA RESULTS DOCUMENT: HCPCS | Performed by: NURSE PRACTITIONER

## 2019-12-11 PROCEDURE — G8417 CALC BMI ABV UP PARAM F/U: HCPCS | Performed by: NURSE PRACTITIONER

## 2019-12-11 PROCEDURE — G8482 FLU IMMUNIZE ORDER/ADMIN: HCPCS | Performed by: NURSE PRACTITIONER

## 2019-12-11 PROCEDURE — G8510 SCR DEP NEG, NO PLAN REQD: HCPCS | Performed by: NURSE PRACTITIONER

## 2019-12-11 PROCEDURE — 90653 IIV ADJUVANT VACCINE IM: CPT | Performed by: NURSE PRACTITIONER

## 2019-12-11 PROCEDURE — 99214 OFFICE O/P EST MOD 30 MIN: CPT | Performed by: NURSE PRACTITIONER

## 2019-12-11 PROCEDURE — G8427 DOCREV CUR MEDS BY ELIG CLIN: HCPCS | Performed by: NURSE PRACTITIONER

## 2019-12-11 RX ORDER — CEPHALEXIN 500 MG/1
500 CAPSULE ORAL 4 TIMES DAILY
COMMUNITY
Start: 2019-12-10 | End: 2019-12-21

## 2019-12-11 ASSESSMENT — PATIENT HEALTH QUESTIONNAIRE - PHQ9
1. LITTLE INTEREST OR PLEASURE IN DOING THINGS: 0
SUM OF ALL RESPONSES TO PHQ QUESTIONS 1-9: 0
SUM OF ALL RESPONSES TO PHQ9 QUESTIONS 1 & 2: 0
SUM OF ALL RESPONSES TO PHQ QUESTIONS 1-9: 0
2. FEELING DOWN, DEPRESSED OR HOPELESS: 0

## 2019-12-11 ASSESSMENT — ENCOUNTER SYMPTOMS
WHEEZING: 0
EYE DISCHARGE: 0
VOMITING: 0
CONSTIPATION: 0
COUGH: 0
ROS SKIN COMMENTS: SEE HPI
DIARRHEA: 0
BLOOD IN STOOL: 0
ABDOMINAL PAIN: 0
SHORTNESS OF BREATH: 0
NAUSEA: 0

## 2020-01-16 RX ORDER — OXYBUTYNIN CHLORIDE 5 MG/1
TABLET ORAL
Qty: 180 TABLET | Refills: 0 | Status: SHIPPED | OUTPATIENT
Start: 2020-01-16 | End: 2020-04-13

## 2020-01-27 RX ORDER — MELOXICAM 7.5 MG/1
TABLET ORAL
Qty: 90 TABLET | Refills: 0 | Status: SHIPPED | OUTPATIENT
Start: 2020-01-27 | End: 2020-05-17

## 2020-01-27 RX ORDER — CLONIDINE HYDROCHLORIDE 0.1 MG/1
TABLET ORAL
Qty: 45 TABLET | Refills: 3 | Status: SHIPPED | OUTPATIENT
Start: 2020-01-27 | End: 2021-01-21

## 2020-01-27 NOTE — TELEPHONE ENCOUNTER
Refill request for meloxicam / clonidine  medication.      Name of [facility]- [facility]     Last visit - 12-11-19     Pending visit - 3-2-20    Last refill -10-/1-4-19    Medication Contract signed -   Last Star bob-     Additional Comments

## 2020-02-24 ENCOUNTER — HOSPITAL ENCOUNTER (OUTPATIENT)
Age: 80
Discharge: HOME | End: 2020-02-24
Payer: MEDICARE

## 2020-02-24 LAB
A/G RATIO: 1.5 (ref 1.1–2.2)
ALBUMIN SERPL-MCNC: 4.1 G/DL (ref 3.4–5)
ALP BLD-CCNC: 87 U/L (ref 40–129)
ALT SERPL-CCNC: 47 U/L (ref 10–40)
ANION GAP SERPL CALCULATED.3IONS-SCNC: 14 MMOL/L (ref 3–16)
AST SERPL-CCNC: 43 U/L (ref 15–37)
BASOPHILS ABSOLUTE: 0 K/UL (ref 0–0.2)
BASOPHILS RELATIVE PERCENT: 0.8 %
BILIRUB SERPL-MCNC: 0.7 MG/DL (ref 0–1)
BUN BLDV-MCNC: 32 MG/DL (ref 7–20)
CALCIUM SERPL-MCNC: 10.6 MG/DL (ref 8.3–10.6)
CHLORIDE BLD-SCNC: 109 MMOL/L (ref 99–110)
CHOLESTEROL, TOTAL: 129 MG/DL (ref 0–199)
CO2: 22 MMOL/L (ref 21–32)
CREAT SERPL-MCNC: 0.7 MG/DL (ref 0.6–1.2)
EOSINOPHILS ABSOLUTE: 0.3 K/UL (ref 0–0.6)
EOSINOPHILS RELATIVE PERCENT: 6 %
GFR AFRICAN AMERICAN: >60
GFR NON-AFRICAN AMERICAN: >60
GLOBULIN: 2.8 G/DL
GLUCOSE BLD-MCNC: 112 MG/DL (ref 70–99)
HCT VFR BLD CALC: 40.6 % (ref 36–48)
HDLC SERPL-MCNC: 47 MG/DL (ref 40–60)
HEMOGLOBIN: 13.8 G/DL (ref 12–16)
LDL CHOLESTEROL CALCULATED: 46 MG/DL
LYMPHOCYTES ABSOLUTE: 1.8 K/UL (ref 1–5.1)
LYMPHOCYTES RELATIVE PERCENT: 31.3 %
MCH RBC QN AUTO: 37.2 PG (ref 26–34)
MCHC RBC AUTO-ENTMCNC: 34 G/DL (ref 31–36)
MCV RBC AUTO: 109.4 FL (ref 80–100)
MONOCYTES ABSOLUTE: 0.7 K/UL (ref 0–1.3)
MONOCYTES RELATIVE PERCENT: 11.3 %
NEUTROPHILS ABSOLUTE: 2.9 K/UL (ref 1.7–7.7)
NEUTROPHILS RELATIVE PERCENT: 50.6 %
PDW BLD-RTO: 11.9 % (ref 12.4–15.4)
PLATELET # BLD: 121 K/UL (ref 135–450)
PMV BLD AUTO: 8.8 FL (ref 5–10.5)
POTASSIUM SERPL-SCNC: 5 MMOL/L (ref 3.5–5.1)
RBC # BLD: 3.71 M/UL (ref 4–5.2)
SODIUM BLD-SCNC: 145 MMOL/L (ref 136–145)
T3 TOTAL: 1.18 NG/ML (ref 0.8–2)
T4 FREE: 1.7 NG/DL (ref 0.9–1.8)
TOTAL PROTEIN: 6.9 G/DL (ref 6.4–8.2)
TRIGL SERPL-MCNC: 181 MG/DL (ref 0–150)
TSH REFLEX: 0.02 UIU/ML (ref 0.27–4.2)
VLDLC SERPL CALC-MCNC: 36 MG/DL
WBC # BLD: 5.8 K/UL (ref 4–11)

## 2020-02-24 PROCEDURE — 80053 COMPREHEN METABOLIC PANEL: CPT

## 2020-02-24 PROCEDURE — 84480 ASSAY TRIIODOTHYRONINE (T3): CPT

## 2020-02-24 PROCEDURE — 85025 COMPLETE CBC W/AUTO DIFF WBC: CPT

## 2020-02-24 PROCEDURE — 84439 ASSAY OF FREE THYROXINE: CPT

## 2020-02-24 PROCEDURE — 80061 LIPID PANEL: CPT

## 2020-02-24 PROCEDURE — 36415 COLL VENOUS BLD VENIPUNCTURE: CPT

## 2020-02-24 PROCEDURE — 84443 ASSAY THYROID STIM HORMONE: CPT

## 2020-02-25 ENCOUNTER — TELEPHONE (OUTPATIENT)
Dept: INTERNAL MEDICINE CLINIC | Age: 80
End: 2020-02-25

## 2020-02-25 DIAGNOSIS — D75.89 MACROCYTIC: ICD-10-CM

## 2020-02-25 DIAGNOSIS — R73.01 IFG (IMPAIRED FASTING GLUCOSE): ICD-10-CM

## 2020-02-25 LAB
ESTIMATED AVERAGE GLUCOSE: 82.5 MG/DL
HBA1C MFR BLD: 4.5 %

## 2020-02-25 NOTE — TELEPHONE ENCOUNTER
The  told me they did not have the SST tube - Will patient have to have this lab re-drawn for B12 and Folate?

## 2020-02-25 NOTE — TELEPHONE ENCOUNTER
Yes, I would recommend she do have it drawn.  Her CBC is suspicious for vit b/folic acid abnormality

## 2020-03-02 ENCOUNTER — OFFICE VISIT (OUTPATIENT)
Dept: INTERNAL MEDICINE CLINIC | Age: 80
End: 2020-03-02
Payer: MEDICARE

## 2020-03-02 VITALS
DIASTOLIC BLOOD PRESSURE: 78 MMHG | RESPIRATION RATE: 16 BRPM | WEIGHT: 178 LBS | BODY MASS INDEX: 31.03 KG/M2 | SYSTOLIC BLOOD PRESSURE: 126 MMHG | OXYGEN SATURATION: 97 % | HEART RATE: 79 BPM

## 2020-03-02 PROCEDURE — G8399 PT W/DXA RESULTS DOCUMENT: HCPCS | Performed by: INTERNAL MEDICINE

## 2020-03-02 PROCEDURE — G8427 DOCREV CUR MEDS BY ELIG CLIN: HCPCS | Performed by: INTERNAL MEDICINE

## 2020-03-02 PROCEDURE — 4040F PNEUMOC VAC/ADMIN/RCVD: CPT | Performed by: INTERNAL MEDICINE

## 2020-03-02 PROCEDURE — G8417 CALC BMI ABV UP PARAM F/U: HCPCS | Performed by: INTERNAL MEDICINE

## 2020-03-02 PROCEDURE — 99214 OFFICE O/P EST MOD 30 MIN: CPT | Performed by: INTERNAL MEDICINE

## 2020-03-02 PROCEDURE — 1123F ACP DISCUSS/DSCN MKR DOCD: CPT | Performed by: INTERNAL MEDICINE

## 2020-03-02 PROCEDURE — G8482 FLU IMMUNIZE ORDER/ADMIN: HCPCS | Performed by: INTERNAL MEDICINE

## 2020-03-02 PROCEDURE — 1090F PRES/ABSN URINE INCON ASSESS: CPT | Performed by: INTERNAL MEDICINE

## 2020-03-02 PROCEDURE — 1036F TOBACCO NON-USER: CPT | Performed by: INTERNAL MEDICINE

## 2020-03-02 ASSESSMENT — PATIENT HEALTH QUESTIONNAIRE - PHQ9
2. FEELING DOWN, DEPRESSED OR HOPELESS: 0
SUM OF ALL RESPONSES TO PHQ QUESTIONS 1-9: 0
1. LITTLE INTEREST OR PLEASURE IN DOING THINGS: 0
SUM OF ALL RESPONSES TO PHQ QUESTIONS 1-9: 0
SUM OF ALL RESPONSES TO PHQ9 QUESTIONS 1 & 2: 0

## 2020-03-02 NOTE — PROGRESS NOTES
normal.  Good orientation. Blood pressure 126/78, pulse 79, resp. rate 16, weight 178 lb (80.7 kg), SpO2 97 %. ASSESSMENT/PLAN:  1. Essential hypertension  Stable. Continue present treatment. - Comprehensive Metabolic Panel; Future    2. Elevated lipoprotein(a)  Borderline elevated triglycerides. - Lipid Panel; Future    3. IFG (impaired fasting glucose)  Glucose 112. Discussed low sugar low-carb weight reduction diet    4. Hypothyroidism, unspecified type  Free T4  1.7 upper limits of normal zone. We will continue with present dose for now    5. Macrocytic  Have evaluated thyroid. Also obtain folate and B12 levels. Call me for results    6. Thrombocytopenia (HCC)      Platelet count: 057. Monitor CBC.  - CBC; Future    7. Insomnia. Discussed avoiding naps during the day, set alarm clock get up by 8 AM.  Take melatonin before bedtime 9 or 10 PM.    8.  Fatigue     Likely secondary to insomnia and inadequate sleep. As above avoid naps, trial of melatonin before bedtime, set alarm clock for mid to early morning. Return to follow-up  Dr. lisa Montgomery in about 6 months (around 9/2/2020), or 40 min, for HTN   IFG. An electronic signature was used to authenticate this note.     --Beth Avery MD on 3/2/2020 at 6:16 PM

## 2020-03-03 ENCOUNTER — HOSPITAL ENCOUNTER (OUTPATIENT)
Age: 80
Discharge: HOME | End: 2020-03-03
Payer: MEDICARE

## 2020-03-03 LAB
FOLATE: >20 NG/ML (ref 4.78–24.2)
VITAMIN B-12: 1163 PG/ML (ref 211–911)

## 2020-03-03 PROCEDURE — 82746 ASSAY OF FOLIC ACID SERUM: CPT

## 2020-03-03 PROCEDURE — 82607 VITAMIN B-12: CPT

## 2020-03-03 PROCEDURE — 36415 COLL VENOUS BLD VENIPUNCTURE: CPT

## 2020-05-15 NOTE — TELEPHONE ENCOUNTER
Refill request for meloxicam  medication.      Name of [facility]- [facility]     Last visit - 3-2-2020     Pending visit - 9-3-2020    Last refill -1-    Medication Contract signed -   Monster bbo-     Additional Comments

## 2020-05-17 RX ORDER — MELOXICAM 7.5 MG/1
TABLET ORAL
Qty: 90 TABLET | Refills: 0 | Status: SHIPPED | OUTPATIENT
Start: 2020-05-17 | End: 2020-08-11

## 2020-08-11 RX ORDER — MELOXICAM 7.5 MG/1
TABLET ORAL
Qty: 90 TABLET | Refills: 0 | Status: SHIPPED | OUTPATIENT
Start: 2020-08-11 | End: 2020-11-10

## 2020-08-11 RX ORDER — METOPROLOL SUCCINATE 50 MG/1
TABLET, EXTENDED RELEASE ORAL
Qty: 90 TABLET | Refills: 2 | Status: SHIPPED | OUTPATIENT
Start: 2020-08-11 | End: 2021-05-13

## 2020-08-11 NOTE — TELEPHONE ENCOUNTER
Refill request for metoprolol mobic medication.      Name of [facility]- [facility]    Last visit - 3-2-20     Pending visit - 9-3-20    Last refill -5-17-20    Medication Contract signed -   Monster bob-     Additional Comments

## 2020-09-29 ENCOUNTER — HOSPITAL ENCOUNTER (OUTPATIENT)
Age: 80
Discharge: HOME | End: 2020-09-29
Payer: MEDICARE

## 2020-09-29 LAB
A/G RATIO: 1.6 (ref 1.1–2.2)
ALBUMIN SERPL-MCNC: 4.2 G/DL (ref 3.4–5)
ALP BLD-CCNC: 108 U/L (ref 40–129)
ALT SERPL-CCNC: 38 U/L (ref 10–40)
ANION GAP SERPL CALCULATED.3IONS-SCNC: 11 MMOL/L (ref 3–16)
AST SERPL-CCNC: 37 U/L (ref 15–37)
BILIRUB SERPL-MCNC: 0.8 MG/DL (ref 0–1)
BUN BLDV-MCNC: 26 MG/DL (ref 7–20)
CALCIUM SERPL-MCNC: 10.5 MG/DL (ref 8.3–10.6)
CHLORIDE BLD-SCNC: 106 MMOL/L (ref 99–110)
CHOLESTEROL, TOTAL: 156 MG/DL (ref 0–199)
CO2: 23 MMOL/L (ref 21–32)
CREAT SERPL-MCNC: 0.8 MG/DL (ref 0.6–1.2)
GFR AFRICAN AMERICAN: >60
GFR NON-AFRICAN AMERICAN: >60
GLOBULIN: 2.7 G/DL
GLUCOSE BLD-MCNC: 99 MG/DL (ref 70–99)
HCT VFR BLD CALC: 43.2 % (ref 36–48)
HDLC SERPL-MCNC: 49 MG/DL (ref 40–60)
HEMOGLOBIN: 14.5 G/DL (ref 12–16)
LDL CHOLESTEROL CALCULATED: 73 MG/DL
MCH RBC QN AUTO: 36.6 PG (ref 26–34)
MCHC RBC AUTO-ENTMCNC: 33.7 G/DL (ref 31–36)
MCV RBC AUTO: 108.7 FL (ref 80–100)
PDW BLD-RTO: 12.1 % (ref 12.4–15.4)
PLATELET # BLD: 134 K/UL (ref 135–450)
PMV BLD AUTO: 8.9 FL (ref 5–10.5)
POTASSIUM SERPL-SCNC: 5.5 MMOL/L (ref 3.5–5.1)
RBC # BLD: 3.97 M/UL (ref 4–5.2)
SODIUM BLD-SCNC: 140 MMOL/L (ref 136–145)
TOTAL PROTEIN: 6.9 G/DL (ref 6.4–8.2)
TRIGL SERPL-MCNC: 171 MG/DL (ref 0–150)
VLDLC SERPL CALC-MCNC: 34 MG/DL
WBC # BLD: 5.7 K/UL (ref 4–11)

## 2020-09-29 PROCEDURE — 85027 COMPLETE CBC AUTOMATED: CPT

## 2020-09-29 PROCEDURE — 36415 COLL VENOUS BLD VENIPUNCTURE: CPT

## 2020-09-29 PROCEDURE — 80053 COMPREHEN METABOLIC PANEL: CPT

## 2020-09-29 PROCEDURE — 80061 LIPID PANEL: CPT

## 2020-10-02 ENCOUNTER — OFFICE VISIT (OUTPATIENT)
Dept: INTERNAL MEDICINE CLINIC | Age: 80
End: 2020-10-02
Payer: MEDICARE

## 2020-10-02 VITALS
HEART RATE: 76 BPM | OXYGEN SATURATION: 99 % | SYSTOLIC BLOOD PRESSURE: 128 MMHG | TEMPERATURE: 97.9 F | DIASTOLIC BLOOD PRESSURE: 68 MMHG | WEIGHT: 178.2 LBS | RESPIRATION RATE: 16 BRPM | BODY MASS INDEX: 31.07 KG/M2

## 2020-10-02 PROCEDURE — G0008 ADMIN INFLUENZA VIRUS VAC: HCPCS | Performed by: NURSE PRACTITIONER

## 2020-10-02 PROCEDURE — 4040F PNEUMOC VAC/ADMIN/RCVD: CPT | Performed by: NURSE PRACTITIONER

## 2020-10-02 PROCEDURE — G8510 SCR DEP NEG, NO PLAN REQD: HCPCS | Performed by: NURSE PRACTITIONER

## 2020-10-02 PROCEDURE — 3288F FALL RISK ASSESSMENT DOCD: CPT | Performed by: NURSE PRACTITIONER

## 2020-10-02 PROCEDURE — G8417 CALC BMI ABV UP PARAM F/U: HCPCS | Performed by: NURSE PRACTITIONER

## 2020-10-02 PROCEDURE — 90694 VACC AIIV4 NO PRSRV 0.5ML IM: CPT | Performed by: NURSE PRACTITIONER

## 2020-10-02 PROCEDURE — 99214 OFFICE O/P EST MOD 30 MIN: CPT | Performed by: NURSE PRACTITIONER

## 2020-10-02 PROCEDURE — G8484 FLU IMMUNIZE NO ADMIN: HCPCS | Performed by: NURSE PRACTITIONER

## 2020-10-02 PROCEDURE — 1090F PRES/ABSN URINE INCON ASSESS: CPT | Performed by: NURSE PRACTITIONER

## 2020-10-02 PROCEDURE — 1036F TOBACCO NON-USER: CPT | Performed by: NURSE PRACTITIONER

## 2020-10-02 PROCEDURE — G8399 PT W/DXA RESULTS DOCUMENT: HCPCS | Performed by: NURSE PRACTITIONER

## 2020-10-02 PROCEDURE — G8427 DOCREV CUR MEDS BY ELIG CLIN: HCPCS | Performed by: NURSE PRACTITIONER

## 2020-10-02 PROCEDURE — 1123F ACP DISCUSS/DSCN MKR DOCD: CPT | Performed by: NURSE PRACTITIONER

## 2020-10-02 ASSESSMENT — ENCOUNTER SYMPTOMS
VOMITING: 0
ABDOMINAL DISTENTION: 0
CHEST TIGHTNESS: 0
CONSTIPATION: 0
SHORTNESS OF BREATH: 0
NAUSEA: 0
DIARRHEA: 0

## 2020-10-02 ASSESSMENT — PATIENT HEALTH QUESTIONNAIRE - PHQ9
SUM OF ALL RESPONSES TO PHQ9 QUESTIONS 1 & 2: 0
SUM OF ALL RESPONSES TO PHQ QUESTIONS 1-9: 0
2. FEELING DOWN, DEPRESSED OR HOPELESS: 0
SUM OF ALL RESPONSES TO PHQ QUESTIONS 1-9: 0
1. LITTLE INTEREST OR PLEASURE IN DOING THINGS: 0

## 2020-10-02 NOTE — PROGRESS NOTES
[facility] [address]  [address] [facility]  [address]  Tel:[phone]    Portillo Pretty is a 78 y.o. female who presents today for hermedical conditions/complaints as noted below. Portillo Pretty is c/o of Annual Exam    Chief Complaint   Patient presents with    Annual Exam     HPI:     Ms. Kiran presents for her regular check up. She states she is doing overall ok. Does complain of continued severe fatigue throughout the day. She tried melatonin by recommendation of Dr. Jimmy Johnston which did not help symptoms. Has not changed sleeping habits. Avoids naps during the day. Denies snoring at night time. Does state she notices daytime grogginess, however. STOPBAN. Compensated/borderline macrocytic anemia noted on CBC testing. Druze about taking her levothyroxine dose. HTN stated to be well controlled. Denies other s/e of medications including lightheadedness, dizziness, palpitations, dyspnea. Does try to control her diet and be as physically active as possible, however has physical mobility limitations. Subjective:     Review of Systems   Constitutional: Positive for fatigue. Negative for activity change, appetite change, chills, diaphoresis and unexpected weight change. Respiratory: Negative for chest tightness and shortness of breath. Cardiovascular: Negative for chest pain, palpitations and leg swelling. Gastrointestinal: Negative for abdominal distention, constipation, diarrhea, nausea and vomiting. Genitourinary: Negative for difficulty urinating and urgency. Musculoskeletal: Negative for arthralgias. Skin: Negative for rash. Neurological: Negative for dizziness, weakness and light-headedness.      Objective:     Vitals:    10/02/20 1505   BP: 128/68   Site: Left Upper Arm   Position: Sitting   Pulse: 76   Resp: 16   Temp: 97.9 °F (36.6 °C)   TempSrc: Infrared   SpO2: 99%   Weight: 178 lb 3.2 oz (80.8 kg)     Physical Exam  Constitutional:       Appearance: Normal appearance. She is well-developed. HENT:      Head: Normocephalic. Right Ear: Hearing and external ear normal.      Left Ear: Hearing and external ear normal.      Nose: Nose normal.   Eyes:      General: Lids are normal. Lids are everted, no foreign bodies appreciated. Conjunctiva/sclera: Conjunctivae normal.      Pupils: Pupils are equal, round, and reactive to light. Neck:      Musculoskeletal: Full passive range of motion without pain, normal range of motion and neck supple. Thyroid: No thyroid mass. Vascular: Normal carotid pulses. No carotid bruit or JVD. Cardiovascular:      Rate and Rhythm: Normal rate and regular rhythm. No extrasystoles are present. Chest Wall: PMI is not displaced. Pulses:           Radial pulses are 2+ on the right side and 2+ on the left side. Dorsalis pedis pulses are 2+ on the right side and 2+ on the left side. Heart sounds: Normal heart sounds, S1 normal and S2 normal. Heart sounds not distant. No murmur. No friction rub. No gallop. No S3 or S4 sounds. Pulmonary:      Effort: Pulmonary effort is normal. No respiratory distress. Breath sounds: Normal breath sounds. No decreased breath sounds, wheezing, rhonchi or rales. Abdominal:      General: Bowel sounds are normal. There is no distension. Palpations: Abdomen is soft. Tenderness: There is no abdominal tenderness. There is no rebound. Musculoskeletal: Normal range of motion. Skin:     General: Skin is warm and dry. Neurological:      Cranial Nerves: No cranial nerve deficit. Psychiatric:         Speech: Speech normal.         Behavior: Behavior normal.         Thought Content: Thought content normal.         Judgment: Judgment normal.       Assessment & Plan: The following diagnoses and conditions are stable with no further orders unlessindicated:    1. Flu vaccine need    2. Macrocytic    3.  Essential hypertension    4. Sacroiliitis (HCC)    5. Elevated lipids    6. Hypothyroidism, unspecified type        Andrea Manrique was seen today for annual exam.    Diagnoses and all orders for this visit:    Flu vaccine need  -     INFLUENZA, QUADV, ADJUVANTED, 65 YRS =, IM, PF, PREFILL SYR, 0.5ML (FLUAD)    Macrocytic  Fatigue  -     TSH with Reflex; Future  -     CBC Auto Differential; Future  -     VITAMIN B12 & FOLATE; Future    Investigate with further blood work. Encouraged taking vit b complex to replenish questionable b vitamin deficiency. Encouraged daily physical activity to promote wakefulness and help stimulate. Essential hypertension    -     COMPREHENSIVE METABOLIC PANEL; Future    BP stable. Encouraged decreasing sodium in the diet, weight loss, and gradual increases in exercise at least 20 minutes daily to further benefit BP    Elevated lipids    Stable. Continue statin. Encouraged decreasing fatty, cholesterol rich foods in the diet (I.e. Red meats, butter, whole fat milk). Dietary choices like olive oil instead of butter, baked foods instead of fried can help to further prevent future elevations. Foods high in omega fatty acids can help to further decrease lipids additionally. Emphasis placed on incorporating fish, lean proteins (chicken, turkey, pork), fresh fruits and vegetables. Hypothyroidism, unspecified type    Stable. On the higher end of control. Will monitor with upcoming TSH. Encouraged taking daily at the same time first thing in AM.     Return in about 3 months (around 1/2/2021) for fatigue labs. Patient should call the office immediately with new or ongoing signs or symptoms or worsening, or proceed to the emergency room. If you are onmedications which could impair your senses, you are at risk of weakness, falls, dizziness, or drowsiness.   You should be careful during activities which could place you at risk of harm, such as climbing, using stairs,operating machinery, or driving

## 2020-10-16 RX ORDER — LEVOTHYROXINE SODIUM 0.12 MG/1
TABLET ORAL
Qty: 90 TABLET | Refills: 1 | Status: SHIPPED | OUTPATIENT
Start: 2020-10-16 | End: 2021-04-14 | Stop reason: SDUPTHER

## 2020-10-16 NOTE — TELEPHONE ENCOUNTER
Refill request for LEVOTHYROXINE medication.      Name of Pharmacy- [address] [place] [address]      Last visit - 10/2/20     Pending visit - 4/5/21    Last refill -4/14/20      Medication Contract signed -   Last Oarrs ran-         Additional Comments

## 2020-11-10 RX ORDER — MELOXICAM 7.5 MG/1
TABLET ORAL
Qty: 90 TABLET | Refills: 0 | Status: SHIPPED | OUTPATIENT
Start: 2020-11-10 | End: 2021-02-16

## 2020-11-10 NOTE — TELEPHONE ENCOUNTER
Refill request for meloxicam medication.      Name of [address] [place] [address] visit - 10/2/2020     Pending visit - 4/5/2021    Last refill -8/11/2020  0 refills

## 2020-12-13 RX ORDER — OXYBUTYNIN CHLORIDE 5 MG/1
TABLET ORAL
Qty: 180 TABLET | Refills: 1 | Status: ON HOLD | OUTPATIENT
Start: 2020-12-13 | End: 2021-10-20 | Stop reason: HOSPADM

## 2021-02-15 NOTE — TELEPHONE ENCOUNTER
Refill request for meloxicam medication.      Name of [address] [place] [address] visit - 10/2/2020     Pending visit - 4/5/2021    Last refill -11/10/2020  0 refills

## 2021-02-16 RX ORDER — MELOXICAM 7.5 MG/1
TABLET ORAL
Qty: 90 TABLET | Refills: 0 | Status: SHIPPED | OUTPATIENT
Start: 2021-02-16 | End: 2021-05-13

## 2021-02-28 RX ORDER — IRBESARTAN 300 MG/1
300 TABLET ORAL DAILY
Qty: 90 TABLET | Refills: 1 | Status: SHIPPED | OUTPATIENT
Start: 2021-02-28 | End: 2021-09-02

## 2021-03-01 DIAGNOSIS — D69.6 THROMBOCYTOPENIA (HCC): ICD-10-CM

## 2021-03-01 DIAGNOSIS — E78.5 ELEVATED LIPIDS: ICD-10-CM

## 2021-03-01 DIAGNOSIS — I10 ESSENTIAL HYPERTENSION: Primary | ICD-10-CM

## 2021-03-01 DIAGNOSIS — E03.9 HYPOTHYROIDISM, UNSPECIFIED TYPE: ICD-10-CM

## 2021-03-01 DIAGNOSIS — R73.01 IFG (IMPAIRED FASTING GLUCOSE): ICD-10-CM

## 2021-03-03 ENCOUNTER — TELEPHONE (OUTPATIENT)
Dept: INTERNAL MEDICINE CLINIC | Age: 81
End: 2021-03-03

## 2021-03-15 RX ORDER — ATORVASTATIN CALCIUM 40 MG/1
TABLET, FILM COATED ORAL
Qty: 45 TABLET | Refills: 1 | Status: SHIPPED | OUTPATIENT
Start: 2021-03-15 | End: 2021-09-07 | Stop reason: SDUPTHER

## 2021-03-15 NOTE — TELEPHONE ENCOUNTER
Refill request for atorvastatin medication.      Name of [facility]- [facility]      Last visit - 10/2/20     Pending visit -     Last refill -9/12/20

## 2021-04-08 ENCOUNTER — TELEPHONE (OUTPATIENT)
Dept: INTERNAL MEDICINE CLINIC | Age: 81
End: 2021-04-08

## 2021-04-09 ENCOUNTER — HOSPITAL ENCOUNTER (OUTPATIENT)
Age: 81
Discharge: HOME | End: 2021-04-09
Payer: MEDICARE

## 2021-04-09 DIAGNOSIS — R73.01 IFG (IMPAIRED FASTING GLUCOSE): ICD-10-CM

## 2021-04-09 DIAGNOSIS — D69.6 THROMBOCYTOPENIA (HCC): ICD-10-CM

## 2021-04-09 DIAGNOSIS — I10 ESSENTIAL HYPERTENSION: ICD-10-CM

## 2021-04-09 DIAGNOSIS — E78.5 ELEVATED LIPIDS: ICD-10-CM

## 2021-04-09 DIAGNOSIS — E03.9 HYPOTHYROIDISM, UNSPECIFIED TYPE: ICD-10-CM

## 2021-04-09 LAB
A/G RATIO: 1.5 (ref 1.1–2.2)
ALBUMIN SERPL-MCNC: 4.3 G/DL (ref 3.4–5)
ALP BLD-CCNC: 99 U/L (ref 40–129)
ALT SERPL-CCNC: 31 U/L (ref 10–40)
ANION GAP SERPL CALCULATED.3IONS-SCNC: 12 MMOL/L (ref 3–16)
AST SERPL-CCNC: 37 U/L (ref 15–37)
BILIRUB SERPL-MCNC: 0.7 MG/DL (ref 0–1)
BUN BLDV-MCNC: 28 MG/DL (ref 7–20)
CALCIUM SERPL-MCNC: 9.9 MG/DL (ref 8.3–10.6)
CHLORIDE BLD-SCNC: 107 MMOL/L (ref 99–110)
CHOLESTEROL, TOTAL: 129 MG/DL (ref 0–199)
CO2: 22 MMOL/L (ref 21–32)
CREAT SERPL-MCNC: 0.8 MG/DL (ref 0.6–1.2)
FOLATE: >20 NG/ML (ref 4.78–24.2)
GFR AFRICAN AMERICAN: >60
GFR NON-AFRICAN AMERICAN: >60
GLOBULIN: 2.8 G/DL
GLUCOSE BLD-MCNC: 100 MG/DL (ref 70–99)
HCT VFR BLD CALC: 43.2 % (ref 36–48)
HDLC SERPL-MCNC: 54 MG/DL (ref 40–60)
HEMOGLOBIN: 14.8 G/DL (ref 12–16)
LDL CHOLESTEROL CALCULATED: 43 MG/DL
MCH RBC QN AUTO: 37.5 PG (ref 26–34)
MCHC RBC AUTO-ENTMCNC: 34.2 G/DL (ref 31–36)
MCV RBC AUTO: 109.5 FL (ref 80–100)
PDW BLD-RTO: 12.5 % (ref 12.4–15.4)
PLATELET # BLD: 135 K/UL (ref 135–450)
PMV BLD AUTO: 8.6 FL (ref 5–10.5)
POTASSIUM SERPL-SCNC: 4.6 MMOL/L (ref 3.5–5.1)
RBC # BLD: 3.95 M/UL (ref 4–5.2)
SODIUM BLD-SCNC: 141 MMOL/L (ref 136–145)
T3 TOTAL: 1.05 NG/ML (ref 0.8–2)
T4 FREE: 1.6 NG/DL (ref 0.9–1.8)
T4 TOTAL: 9.1 UG/DL (ref 4.5–10.9)
TOTAL PROTEIN: 7.1 G/DL (ref 6.4–8.2)
TRIGL SERPL-MCNC: 160 MG/DL (ref 0–150)
TSH REFLEX: 0.05 UIU/ML (ref 0.27–4.2)
VITAMIN B-12: 1274 PG/ML (ref 211–911)
VLDLC SERPL CALC-MCNC: 32 MG/DL
WBC # BLD: 6 K/UL (ref 4–11)

## 2021-04-09 PROCEDURE — 84443 ASSAY THYROID STIM HORMONE: CPT

## 2021-04-09 PROCEDURE — 83036 HEMOGLOBIN GLYCOSYLATED A1C: CPT

## 2021-04-09 PROCEDURE — 85027 COMPLETE CBC AUTOMATED: CPT

## 2021-04-09 PROCEDURE — 36415 COLL VENOUS BLD VENIPUNCTURE: CPT

## 2021-04-09 PROCEDURE — 80053 COMPREHEN METABOLIC PANEL: CPT

## 2021-04-09 PROCEDURE — 82746 ASSAY OF FOLIC ACID SERUM: CPT

## 2021-04-09 PROCEDURE — 84480 ASSAY TRIIODOTHYRONINE (T3): CPT

## 2021-04-09 PROCEDURE — 80061 LIPID PANEL: CPT

## 2021-04-09 PROCEDURE — 84439 ASSAY OF FREE THYROXINE: CPT

## 2021-04-09 PROCEDURE — 82607 VITAMIN B-12: CPT

## 2021-04-10 LAB
ESTIMATED AVERAGE GLUCOSE: 93.9 MG/DL
HBA1C MFR BLD: 4.9 %

## 2021-04-14 ENCOUNTER — OFFICE VISIT (OUTPATIENT)
Dept: INTERNAL MEDICINE CLINIC | Age: 81
End: 2021-04-14
Payer: MEDICARE

## 2021-04-14 VITALS
WEIGHT: 176 LBS | OXYGEN SATURATION: 94 % | HEART RATE: 69 BPM | BODY MASS INDEX: 30.05 KG/M2 | DIASTOLIC BLOOD PRESSURE: 64 MMHG | SYSTOLIC BLOOD PRESSURE: 108 MMHG | HEIGHT: 64 IN | TEMPERATURE: 98.2 F

## 2021-04-14 DIAGNOSIS — M54.41 CHRONIC BILATERAL LOW BACK PAIN WITH BILATERAL SCIATICA: Primary | ICD-10-CM

## 2021-04-14 DIAGNOSIS — E03.9 HYPOTHYROIDISM, UNSPECIFIED TYPE: ICD-10-CM

## 2021-04-14 DIAGNOSIS — G89.29 CHRONIC BILATERAL LOW BACK PAIN WITH BILATERAL SCIATICA: Primary | ICD-10-CM

## 2021-04-14 DIAGNOSIS — M46.1 SACROILIITIS (HCC): ICD-10-CM

## 2021-04-14 DIAGNOSIS — E78.41 ELEVATED LIPOPROTEIN(A): ICD-10-CM

## 2021-04-14 DIAGNOSIS — M51.36 DDD (DEGENERATIVE DISC DISEASE), LUMBAR: ICD-10-CM

## 2021-04-14 DIAGNOSIS — E78.5 ELEVATED LIPIDS: ICD-10-CM

## 2021-04-14 DIAGNOSIS — I10 ESSENTIAL HYPERTENSION: ICD-10-CM

## 2021-04-14 DIAGNOSIS — M54.42 CHRONIC BILATERAL LOW BACK PAIN WITH BILATERAL SCIATICA: Primary | ICD-10-CM

## 2021-04-14 PROCEDURE — 4040F PNEUMOC VAC/ADMIN/RCVD: CPT | Performed by: NURSE PRACTITIONER

## 2021-04-14 PROCEDURE — G8417 CALC BMI ABV UP PARAM F/U: HCPCS | Performed by: NURSE PRACTITIONER

## 2021-04-14 PROCEDURE — G8427 DOCREV CUR MEDS BY ELIG CLIN: HCPCS | Performed by: NURSE PRACTITIONER

## 2021-04-14 PROCEDURE — 1123F ACP DISCUSS/DSCN MKR DOCD: CPT | Performed by: NURSE PRACTITIONER

## 2021-04-14 PROCEDURE — G8399 PT W/DXA RESULTS DOCUMENT: HCPCS | Performed by: NURSE PRACTITIONER

## 2021-04-14 PROCEDURE — 3288F FALL RISK ASSESSMENT DOCD: CPT | Performed by: NURSE PRACTITIONER

## 2021-04-14 PROCEDURE — 99214 OFFICE O/P EST MOD 30 MIN: CPT | Performed by: NURSE PRACTITIONER

## 2021-04-14 PROCEDURE — 1036F TOBACCO NON-USER: CPT | Performed by: NURSE PRACTITIONER

## 2021-04-14 PROCEDURE — 1090F PRES/ABSN URINE INCON ASSESS: CPT | Performed by: NURSE PRACTITIONER

## 2021-04-14 RX ORDER — LEVOTHYROXINE SODIUM 0.12 MG/1
TABLET ORAL
Qty: 90 TABLET | Refills: 1 | Status: SHIPPED | OUTPATIENT
Start: 2021-04-14 | End: 2021-10-13

## 2021-04-14 RX ORDER — ROSUVASTATIN CALCIUM 20 MG/1
20 TABLET, COATED ORAL DAILY
Qty: 30 TABLET | Refills: 0 | Status: SHIPPED | OUTPATIENT
Start: 2021-04-14 | End: 2021-10-14

## 2021-04-14 ASSESSMENT — ENCOUNTER SYMPTOMS
NAUSEA: 0
SHORTNESS OF BREATH: 0
CHEST TIGHTNESS: 0
DIARRHEA: 0
ABDOMINAL DISTENTION: 0
CONSTIPATION: 0
VOMITING: 0

## 2021-04-14 ASSESSMENT — PATIENT HEALTH QUESTIONNAIRE - PHQ9
2. FEELING DOWN, DEPRESSED OR HOPELESS: 0
1. LITTLE INTEREST OR PLEASURE IN DOING THINGS: 0
SUM OF ALL RESPONSES TO PHQ QUESTIONS 1-9: 0

## 2021-04-14 NOTE — PROGRESS NOTES
falling backwards as a result of lost footing. Is taking meloxicam for pain currently which does help her symptoms. Past Medical History:   Diagnosis Date    Cancer ([address] 09/2017    right breast cancer status post lumpectomy    Edema     Fatty liver     elevated LFT's    Gout     HIGH CHOLESTEROL     HTN (hypertension)     Hypothyroidism     IFG (impaired fasting glucose)     Osteoarthritis     Palpitations     Pyelonephritis 03        Past Surgical History:   Procedure Laterality Date    APPENDECTOMY  1954    CATARACT REMOVAL WITH IMPLANT Left [id number]    CHOLECYSTECTOMY  12/1970    EPIDURAL STEROID INJECTION Right 11/12/2019    RIGHT LUMBAR FIVE SACRAL ONE EPIDURAL STEROID INJECTION SITE CONFIRMED BY FLUOROSCOPY performed by Bran Rojas MD at [facility] Right 2007    cataract     HYSTERECTOMY  5/1971    JOINT REPLACEMENT Left 2001    hip    JOINT REPLACEMENT Right 2002    hip    OTHER SURGICAL HISTORY Right 09/27/2017    LUMPECTOMY, NEEDLE LOCALIZATION AND SENTINEL NODERIGHT LUMPECTOMY, NEEDLE LOCALIZATION AND SENTINEL NODE    THYROIDECTOMY  1968    TONSILLECTOMY  1945       Family History   Problem Relation Age of Onset    Cancer Mother         breast / liver CA    Other Mother         pernicious anemia    Cancer Brother         prostate cancer    Alcohol Abuse Sister        Social History     Tobacco Use    Smoking status: Never Smoker    Smokeless tobacco: Never Used   Substance Use Topics    Alcohol use:  Yes     Alcohol/week: 5.0 - 6.0 standard drinks     Types: 5 - 6 Glasses of wine per week     Comment: per week        Current Outpatient Medications   Medication Sig Dispense Refill    rosuvastatin (CRESTOR) 20 MG tablet Take 1 tablet by mouth daily 30 tablet 0    levothyroxine (SYNTHROID) 125 MCG tablet TAKE 1 TABLET BY MOUTH DAILY FOR THYROID 90 tablet 1    atorvastatin (LIPITOR) 40 MG tablet TAKE 1/2 TABLET BY MOUTH DAILY FOR HIGH CHOLESTEROL 45 tablet 1    irbesartan (AVAPRO) 300 MG tablet TAKE 1 TABLET BY MOUTH DAILY FOR HIGH BLOOD PRESSURE 90 tablet 1    meloxicam (MOBIC) 7.5 MG tablet TAKE 1 TABLET BY MOUTH DAILY AS NEEDED FOR ARTHRITIS 90 tablet 0    cloNIDine (CATAPRES) 0.1 MG tablet TAKE 1/2 TABLET BY MOUTH DAILY FOR HIGH BLOOD PRESSURE 45 tablet 1    oxybutynin (DITROPAN) 5 MG tablet TAKE 1 TABLET BY MOUTH TWICE DAILY FOR BLADDER 180 tablet 1    metoprolol succinate (TOPROL XL) 50 MG extended release tablet TAKE 1 TABLET BY MOUTH EVERY DAY FOR HIGH BLOOD PRESSURE 90 tablet 2    Multiple Vitamins-Minerals (THERAPEUTIC MULTIVITAMIN-MINERALS) tablet Take 1 tablet by mouth daily      anastrozole (ARIMIDEX) 1 MG tablet TK 1 T PO QD  4    Acetaminophen (TYLENOL 8 HOUR PO) Take 625 mg by mouth 3 times daily        No current facility-administered medications for this visit. Allergies   Allergen Reactions    Lisinopril      Cough.  Vasotec      Cough. Subjective:      Review of Systems   Constitutional: Negative for activity change, fatigue and unexpected weight change. Respiratory: Negative for chest tightness and shortness of breath. Cardiovascular: Negative for chest pain, palpitations and leg swelling. Gastrointestinal: Negative for abdominal distention, constipation, diarrhea, nausea and vomiting. Genitourinary: Negative for difficulty urinating and urgency. Musculoskeletal: Positive for arthralgias, gait problem (BL numbness to feet) and myalgias. Skin: Negative for rash. Neurological: Positive for numbness. Negative for dizziness, weakness and light-headedness. Objective:     Vitals:    04/14/21 1543   BP: 108/64   Site: Left Upper Arm   Position: Sitting   Cuff Size: Large Adult   Pulse: 69   Temp: 98.2 °F (36.8 °C)   TempSrc: Infrared   SpO2: 94%   Weight: 176 lb (79.8 kg)   Height: 5' 3.5\" (1.613 m)       Physical Exam  Vitals signs and nursing note reviewed.    Constitutional: Appearance: Normal appearance. She is well-developed. HENT:      Head: Normocephalic and atraumatic. Right Ear: Hearing and external ear normal.      Left Ear: Hearing and external ear normal.      Nose: Nose normal.   Eyes:      General: Lids are normal.      Pupils: Pupils are equal, round, and reactive to light. Neck:      Musculoskeletal: Normal range of motion. Cardiovascular:      Rate and Rhythm: Normal rate and regular rhythm. No extrasystoles are present. Chest Wall: PMI is not displaced. Pulses: Normal pulses. Heart sounds: Normal heart sounds, S1 normal and S2 normal. Heart sounds not distant. No murmur. No systolic murmur. No diastolic murmur. No friction rub. No gallop. No S3 or S4 sounds. Pulmonary:      Effort: Pulmonary effort is normal. No accessory muscle usage or respiratory distress. Breath sounds: Normal breath sounds. No decreased breath sounds, wheezing, rhonchi or rales. Abdominal:      General: Bowel sounds are normal.      Palpations: Abdomen is soft. Musculoskeletal:      Lumbar back: She exhibits tenderness. Comments: Shuffling gait   Skin:     General: Skin is warm and dry. Neurological:      Mental Status: She is alert. Psychiatric:         Speech: Speech normal.         Assessment & Plan: The following diagnoses and conditions are stable with no further orders unless indicated:    1. Chronic bilateral low back pain with bilateral sciatica    2. Elevated lipoprotein(a)    3. Hypothyroidism, unspecified type    4. Sacroiliitis ([address]    5. DDD (degenerative disc disease), lumbar    6. Essential hypertension    7. Elevated lipids        Jef Lopes was seen today for hypertension, hypothyroidism and other.     Diagnoses and all orders for this visit:    Sacroiliitis ([address]  DDD (degenerative disc disease), lumbar  Chronic bilateral low back pain with bilateral sciatica  -     Lebron Pozo MD, Orthopedic Surgery, [facility] Continue NSAIDs. Encouraged daily physical activity (can be as simple as walking for 10-15 minutes per day) to improve mobility as I feel her mobility limitations are partially due to deconditioning. Elevated lipoprotein(a)  -     rosuvastatin (CRESTOR) 20 MG tablet; Take 1 tablet by mouth daily    Changed to crestor seeing lipitor price increase at her pharmacy. Hypothyroidism, unspecified type    Stable. Continue current dose. Monitor for signs of hyperthyroid including palpitations, tremor, anxiety. Essential hypertension    Stable. Continue current dose. Elevated lipids    Stable. Other orders  -     levothyroxine (SYNTHROID) 125 MCG tablet; TAKE 1 TABLET BY MOUTH DAILY FOR THYROID        Return in about 6 months (around 10/14/2021) for 40 min. Total time spent reviewing patient chart, vitals, assessment, documentation: 30 min    Patientshould call the office immediately with new or ongoing signs or symptoms or worsening, or proceed to the emergency room. If you are on medications which could impair your senses, you are at risk of weakness, falls,dizziness, or drowsiness. You should be careful during activities which could place you at risk of harm, such as climbing, using stairs, operating machinery, or driving vehicles. If you feel you cannot safely do theseactivities, you should request others to help you, or avoid the activities altogether. If you are drowsy for any other reason, you should use the same precautions as listed above. Call if pattern of symptoms change or persists for an extended time.       Torri Manley

## 2021-04-27 ENCOUNTER — HOSPITAL ENCOUNTER (OUTPATIENT)
Dept: WOMENS IMAGING | Age: 81
Discharge: HOME | End: 2021-04-27
Payer: MEDICARE

## 2021-04-27 DIAGNOSIS — N95.1 SYMPTOMATIC MENOPAUSAL OR FEMALE CLIMACTERIC STATES: ICD-10-CM

## 2021-04-27 DIAGNOSIS — C50.411 MALIGNANT NEOPLASM OF UPPER-OUTER QUADRANT OF RIGHT FEMALE BREAST, UNSPECIFIED ESTROGEN RECEPTOR STATUS (HCC): ICD-10-CM

## 2021-04-27 DIAGNOSIS — Z13.820 ENCOUNTER FOR SCREENING FOR OSTEOPOROSIS: ICD-10-CM

## 2021-04-27 DIAGNOSIS — Z12.31 ENCOUNTER FOR SCREENING MAMMOGRAM FOR BREAST CANCER: ICD-10-CM

## 2021-04-27 PROCEDURE — 77063 BREAST TOMOSYNTHESIS BI: CPT

## 2021-04-27 PROCEDURE — 77080 DXA BONE DENSITY AXIAL: CPT

## 2021-09-01 NOTE — TELEPHONE ENCOUNTER
Refill request for irbesartan  medication.      Name [facility]- [facility]       Last visit - 4-     Pending visit - 10-    Last refill -2-      Medication Contract signed -   Monster bob-         Additional Comments

## 2021-09-02 RX ORDER — IRBESARTAN 300 MG/1
300 TABLET ORAL DAILY
Qty: 90 TABLET | Refills: 1 | Status: ON HOLD | OUTPATIENT
Start: 2021-09-02 | End: 2021-10-17 | Stop reason: HOSPADM

## 2021-09-07 RX ORDER — ATORVASTATIN CALCIUM 40 MG/1
TABLET, FILM COATED ORAL
Qty: 45 TABLET | Refills: 1 | Status: SHIPPED | OUTPATIENT
Start: 2021-09-07 | End: 2021-12-02

## 2021-09-07 NOTE — TELEPHONE ENCOUNTER
Refill request for Atorvastatin medication.      Name of [facility]      Last visit - 4/14/21     Pending visit - 10/14/21    Last refill -3/15/21      Medication Contract signed -   Last Oarrs ran-         Additional Comments

## 2021-10-13 DIAGNOSIS — I10 ESSENTIAL HYPERTENSION: ICD-10-CM

## 2021-10-13 RX ORDER — CLONIDINE HYDROCHLORIDE 0.1 MG/1
TABLET ORAL
Qty: 45 TABLET | Refills: 1 | Status: ON HOLD | OUTPATIENT
Start: 2021-10-13 | End: 2021-10-17 | Stop reason: HOSPADM

## 2021-10-13 RX ORDER — LEVOTHYROXINE SODIUM 0.12 MG/1
TABLET ORAL
Qty: 90 TABLET | Refills: 1 | Status: SHIPPED | OUTPATIENT
Start: 2021-10-13 | End: 2022-06-10

## 2021-10-13 NOTE — TELEPHONE ENCOUNTER
Refill request for LEVOTHYROXINE 0.125MG (125MCG) TAB medication.      Name of [address]      Last visit - 4-     Pending visit - 10-    Last refill - 4-      Medication Contract signed -   Last Jen Rounds ran-         Additional Comments

## 2021-10-13 NOTE — TELEPHONE ENCOUNTER
Refill request for Clonidine medication.      Name of [facility]      Last visit - 4/14/21     Pending visit - 10/14/21    Last refill -1/21/21      Medication Contract signed -   Last Kierra ran-         Additional Comments

## 2021-10-14 ENCOUNTER — HOSPITAL ENCOUNTER (INPATIENT)
Age: 81
LOS: 3 days | Discharge: NURSING, REHAB OR LONG-TERM CARE | DRG: 287 | End: 2021-10-17
Attending: EMERGENCY MEDICINE | Admitting: HOSPITALIST
Payer: MEDICARE

## 2021-10-14 ENCOUNTER — APPOINTMENT (OUTPATIENT)
Dept: CT IMAGING | Age: 81
DRG: 287 | End: 2021-10-14
Payer: MEDICARE

## 2021-10-14 ENCOUNTER — OFFICE VISIT (OUTPATIENT)
Dept: INTERNAL MEDICINE CLINIC | Age: 81
End: 2021-10-14
Payer: MEDICARE

## 2021-10-14 ENCOUNTER — APPOINTMENT (OUTPATIENT)
Dept: GENERAL RADIOLOGY | Age: 81
DRG: 287 | End: 2021-10-14
Payer: MEDICARE

## 2021-10-14 VITALS — DIASTOLIC BLOOD PRESSURE: 70 MMHG | OXYGEN SATURATION: 98 % | SYSTOLIC BLOOD PRESSURE: 114 MMHG | HEART RATE: 87 BPM

## 2021-10-14 DIAGNOSIS — R06.09 DOE (DYSPNEA ON EXERTION): ICD-10-CM

## 2021-10-14 DIAGNOSIS — I10 ESSENTIAL HYPERTENSION: ICD-10-CM

## 2021-10-14 DIAGNOSIS — E03.9 HYPOTHYROIDISM, UNSPECIFIED TYPE: ICD-10-CM

## 2021-10-14 DIAGNOSIS — M46.1 SACROILIITIS (HCC): ICD-10-CM

## 2021-10-14 DIAGNOSIS — E78.41 ELEVATED LIPOPROTEIN(A): ICD-10-CM

## 2021-10-14 DIAGNOSIS — D69.6 THROMBOCYTOPENIA (HCC): ICD-10-CM

## 2021-10-14 DIAGNOSIS — R07.9 CHEST PAIN, UNSPECIFIED TYPE: Primary | ICD-10-CM

## 2021-10-14 DIAGNOSIS — R10.9 ABDOMINAL PAIN, UNSPECIFIED ABDOMINAL LOCATION: ICD-10-CM

## 2021-10-14 DIAGNOSIS — R06.02 SHORTNESS OF BREATH: ICD-10-CM

## 2021-10-14 DIAGNOSIS — R53.83 FATIGUE, UNSPECIFIED TYPE: ICD-10-CM

## 2021-10-14 DIAGNOSIS — D75.89 MACROCYTIC: ICD-10-CM

## 2021-10-14 DIAGNOSIS — Z23 NEED FOR INFLUENZA VACCINATION: ICD-10-CM

## 2021-10-14 DIAGNOSIS — R73.01 IFG (IMPAIRED FASTING GLUCOSE): ICD-10-CM

## 2021-10-14 LAB
A/G RATIO: 1.5 (ref 1.1–2.2)
ALBUMIN SERPL-MCNC: 4.2 G/DL (ref 3.4–5)
ALP BLD-CCNC: 107 U/L (ref 40–129)
ALT SERPL-CCNC: 45 U/L (ref 10–40)
ANION GAP SERPL CALCULATED.3IONS-SCNC: 13 MMOL/L (ref 3–16)
AST SERPL-CCNC: 42 U/L (ref 15–37)
BACTERIA: ABNORMAL /HPF
BASE EXCESS ARTERIAL: -4.5 MMOL/L (ref -3–3)
BASE EXCESS VENOUS: -2.9 MMOL/L (ref -3–3)
BASOPHILS ABSOLUTE: 0 K/UL (ref 0–0.2)
BASOPHILS RELATIVE PERCENT: 0.3 %
BILIRUB SERPL-MCNC: 0.8 MG/DL (ref 0–1)
BILIRUBIN URINE: NEGATIVE
BLOOD, URINE: NEGATIVE
BUN BLDV-MCNC: 53 MG/DL (ref 7–20)
CALCIUM IONIZED: 1.57 MMOL/L (ref 1.12–1.32)
CALCIUM SERPL-MCNC: 13.8 MG/DL (ref 8.3–10.6)
CARBOXYHEMOGLOBIN ARTERIAL: 0.6 % (ref 0–1.5)
CARBOXYHEMOGLOBIN: 1.8 % (ref 0–1.5)
CHLORIDE BLD-SCNC: 105 MMOL/L (ref 99–110)
CLARITY: ABNORMAL
CO2: 22 MMOL/L (ref 21–32)
COLOR: YELLOW
CREAT SERPL-MCNC: 1.1 MG/DL (ref 0.6–1.2)
EOSINOPHILS ABSOLUTE: 0 K/UL (ref 0–0.6)
EOSINOPHILS RELATIVE PERCENT: 0.6 %
EPITHELIAL CELLS, UA: ABNORMAL /HPF (ref 0–5)
GFR AFRICAN AMERICAN: 57
GFR NON-AFRICAN AMERICAN: 47
GLOBULIN: 2.8 G/DL
GLUCOSE BLD-MCNC: 107 MG/DL (ref 70–99)
GLUCOSE URINE: NEGATIVE MG/DL
HCO3 ARTERIAL: 16.5 MMOL/L (ref 21–29)
HCO3 VENOUS: 22.9 MMOL/L (ref 23–29)
HCT VFR BLD CALC: 42.5 % (ref 36–48)
HEMOGLOBIN, ART, EXTENDED: 14.4 G/DL (ref 12–16)
HEMOGLOBIN: 14.9 G/DL (ref 12–16)
KETONES, URINE: NEGATIVE MG/DL
LEUKOCYTE ESTERASE, URINE: NEGATIVE
LIPASE: 92 U/L (ref 13–60)
LYMPHOCYTES ABSOLUTE: 1.2 K/UL (ref 1–5.1)
LYMPHOCYTES RELATIVE PERCENT: 15.1 %
MCH RBC QN AUTO: 38.5 PG (ref 26–34)
MCHC RBC AUTO-ENTMCNC: 35.1 G/DL (ref 31–36)
MCV RBC AUTO: 109.7 FL (ref 80–100)
METHEMOGLOBIN ARTERIAL: 0.5 %
METHEMOGLOBIN VENOUS: 0.4 %
MICROSCOPIC EXAMINATION: YES
MONOCYTES ABSOLUTE: 0.9 K/UL (ref 0–1.3)
MONOCYTES RELATIVE PERCENT: 11.1 %
NEUTROPHILS ABSOLUTE: 5.8 K/UL (ref 1.7–7.7)
NEUTROPHILS RELATIVE PERCENT: 72.9 %
NITRITE, URINE: POSITIVE
O2 SAT, ARTERIAL: 98.7 %
O2 SAT, VEN: 60 %
O2 THERAPY: ABNORMAL
O2 THERAPY: ABNORMAL
PCO2 ARTERIAL: 22.1 MMHG (ref 35–45)
PCO2, VEN: 43.7 MMHG (ref 40–50)
PDW BLD-RTO: 13.9 % (ref 12.4–15.4)
PH ARTERIAL: 7.49 (ref 7.35–7.45)
PH UA: 5 (ref 5–8)
PH VENOUS: 7.34 (ref 7.35–7.45)
PH VENOUS: 7.36 (ref 7.35–7.45)
PLATELET # BLD: 169 K/UL (ref 135–450)
PMV BLD AUTO: 6.9 FL (ref 5–10.5)
PO2 ARTERIAL: 130.8 MMHG (ref 75–108)
PO2, VEN: 31.3 MMHG (ref 25–40)
POTASSIUM SERPL-SCNC: 4.7 MMOL/L (ref 3.5–5.1)
PROTEIN UA: NEGATIVE MG/DL
RBC # BLD: 3.88 M/UL (ref 4–5.2)
RBC UA: ABNORMAL /HPF (ref 0–4)
RENAL EPITHELIAL, UA: ABNORMAL /HPF (ref 0–1)
SARS-COV-2, NAAT: NOT DETECTED
SODIUM BLD-SCNC: 140 MMOL/L (ref 136–145)
SPECIFIC GRAVITY UA: 1.02 (ref 1–1.03)
SPECIMEN STATUS: NORMAL
TCO2 ARTERIAL: 17.2 MMOL/L
TCO2 CALC VENOUS: 24 MMOL/L
TOTAL PROTEIN: 7 G/DL (ref 6.4–8.2)
TROPONIN: 0.02 NG/ML
TROPONIN: 0.02 NG/ML
URINE TYPE: ABNORMAL
UROBILINOGEN, URINE: 0.2 E.U./DL
WBC # BLD: 7.9 K/UL (ref 4–11)
WBC UA: ABNORMAL /HPF (ref 0–5)

## 2021-10-14 PROCEDURE — 87635 SARS-COV-2 COVID-19 AMP PRB: CPT

## 2021-10-14 PROCEDURE — 83690 ASSAY OF LIPASE: CPT

## 2021-10-14 PROCEDURE — 6370000000 HC RX 637 (ALT 250 FOR IP): Performed by: NURSE PRACTITIONER

## 2021-10-14 PROCEDURE — 71045 X-RAY EXAM CHEST 1 VIEW: CPT

## 2021-10-14 PROCEDURE — 80053 COMPREHEN METABOLIC PANEL: CPT

## 2021-10-14 PROCEDURE — 2580000003 HC RX 258: Performed by: EMERGENCY MEDICINE

## 2021-10-14 PROCEDURE — 84484 ASSAY OF TROPONIN QUANT: CPT

## 2021-10-14 PROCEDURE — 6360000002 HC RX W HCPCS: Performed by: EMERGENCY MEDICINE

## 2021-10-14 PROCEDURE — 2580000003 HC RX 258: Performed by: NURSE PRACTITIONER

## 2021-10-14 PROCEDURE — 1200000000 HC SEMI PRIVATE

## 2021-10-14 PROCEDURE — G8427 DOCREV CUR MEDS BY ELIG CLIN: HCPCS | Performed by: INTERNAL MEDICINE

## 2021-10-14 PROCEDURE — 82330 ASSAY OF CALCIUM: CPT

## 2021-10-14 PROCEDURE — G8417 CALC BMI ABV UP PARAM F/U: HCPCS | Performed by: INTERNAL MEDICINE

## 2021-10-14 PROCEDURE — 99284 EMERGENCY DEPT VISIT MOD MDM: CPT

## 2021-10-14 PROCEDURE — 6370000000 HC RX 637 (ALT 250 FOR IP): Performed by: EMERGENCY MEDICINE

## 2021-10-14 PROCEDURE — 82542 COL CHROMOTOGRAPHY QUAL/QUAN: CPT

## 2021-10-14 PROCEDURE — 90694 VACC AIIV4 NO PRSRV 0.5ML IM: CPT | Performed by: INTERNAL MEDICINE

## 2021-10-14 PROCEDURE — 82652 VIT D 1 25-DIHYDROXY: CPT

## 2021-10-14 PROCEDURE — 99215 OFFICE O/P EST HI 40 MIN: CPT | Performed by: INTERNAL MEDICINE

## 2021-10-14 PROCEDURE — 1036F TOBACCO NON-USER: CPT | Performed by: INTERNAL MEDICINE

## 2021-10-14 PROCEDURE — G8484 FLU IMMUNIZE NO ADMIN: HCPCS | Performed by: INTERNAL MEDICINE

## 2021-10-14 PROCEDURE — 85025 COMPLETE CBC W/AUTO DIFF WBC: CPT

## 2021-10-14 PROCEDURE — 1090F PRES/ABSN URINE INCON ASSESS: CPT | Performed by: INTERNAL MEDICINE

## 2021-10-14 PROCEDURE — 96374 THER/PROPH/DIAG INJ IV PUSH: CPT

## 2021-10-14 PROCEDURE — 81001 URINALYSIS AUTO W/SCOPE: CPT

## 2021-10-14 PROCEDURE — G8399 PT W/DXA RESULTS DOCUMENT: HCPCS | Performed by: INTERNAL MEDICINE

## 2021-10-14 PROCEDURE — 74176 CT ABD & PELVIS W/O CONTRAST: CPT

## 2021-10-14 PROCEDURE — 4040F PNEUMOC VAC/ADMIN/RCVD: CPT | Performed by: INTERNAL MEDICINE

## 2021-10-14 PROCEDURE — 82803 BLOOD GASES ANY COMBINATION: CPT

## 2021-10-14 PROCEDURE — G0008 ADMIN INFLUENZA VIRUS VAC: HCPCS | Performed by: INTERNAL MEDICINE

## 2021-10-14 PROCEDURE — 1123F ACP DISCUSS/DSCN MKR DOCD: CPT | Performed by: INTERNAL MEDICINE

## 2021-10-14 PROCEDURE — 93005 ELECTROCARDIOGRAM TRACING: CPT | Performed by: NURSE PRACTITIONER

## 2021-10-14 RX ORDER — LEVOTHYROXINE SODIUM 0.12 MG/1
125 TABLET ORAL DAILY
Status: DISCONTINUED | OUTPATIENT
Start: 2021-10-15 | End: 2021-10-17 | Stop reason: HOSPADM

## 2021-10-14 RX ORDER — ONDANSETRON 2 MG/ML
4 INJECTION INTRAMUSCULAR; INTRAVENOUS EVERY 6 HOURS PRN
Status: DISCONTINUED | OUTPATIENT
Start: 2021-10-14 | End: 2021-10-17 | Stop reason: HOSPADM

## 2021-10-14 RX ORDER — METOPROLOL SUCCINATE 50 MG/1
50 TABLET, EXTENDED RELEASE ORAL DAILY
Status: DISCONTINUED | OUTPATIENT
Start: 2021-10-15 | End: 2021-10-17 | Stop reason: HOSPADM

## 2021-10-14 RX ORDER — SODIUM CHLORIDE 0.9 % (FLUSH) 0.9 %
10 SYRINGE (ML) INJECTION PRN
Status: DISCONTINUED | OUTPATIENT
Start: 2021-10-14 | End: 2021-10-17 | Stop reason: HOSPADM

## 2021-10-14 RX ORDER — M-VIT,TX,IRON,MINS/CALC/FOLIC 27MG-0.4MG
1 TABLET ORAL DAILY
Status: DISCONTINUED | OUTPATIENT
Start: 2021-10-15 | End: 2021-10-17 | Stop reason: HOSPADM

## 2021-10-14 RX ORDER — SODIUM CHLORIDE 9 MG/ML
25 INJECTION, SOLUTION INTRAVENOUS PRN
Status: DISCONTINUED | OUTPATIENT
Start: 2021-10-14 | End: 2021-10-15 | Stop reason: SDUPTHER

## 2021-10-14 RX ORDER — TRAMADOL HYDROCHLORIDE 50 MG/1
50 TABLET ORAL 3 TIMES DAILY
Status: ON HOLD | COMMUNITY
Start: 2021-09-27 | End: 2021-10-17 | Stop reason: HOSPADM

## 2021-10-14 RX ORDER — 0.9 % SODIUM CHLORIDE 0.9 %
1000 INTRAVENOUS SOLUTION INTRAVENOUS ONCE
Status: COMPLETED | OUTPATIENT
Start: 2021-10-14 | End: 2021-10-14

## 2021-10-14 RX ORDER — ANASTROZOLE 1 MG/1
1 TABLET ORAL DAILY
Status: DISCONTINUED | OUTPATIENT
Start: 2021-10-15 | End: 2021-10-17 | Stop reason: HOSPADM

## 2021-10-14 RX ORDER — OXYBUTYNIN CHLORIDE 5 MG/1
5 TABLET ORAL 2 TIMES DAILY
Status: DISCONTINUED | OUTPATIENT
Start: 2021-10-14 | End: 2021-10-17 | Stop reason: HOSPADM

## 2021-10-14 RX ORDER — ASPIRIN 81 MG/1
324 TABLET, CHEWABLE ORAL DAILY
Status: DISCONTINUED | OUTPATIENT
Start: 2021-10-14 | End: 2021-10-15

## 2021-10-14 RX ORDER — ONDANSETRON 2 MG/ML
4 INJECTION INTRAMUSCULAR; INTRAVENOUS ONCE
Status: COMPLETED | OUTPATIENT
Start: 2021-10-14 | End: 2021-10-14

## 2021-10-14 RX ORDER — ACETAMINOPHEN 325 MG/1
650 TABLET ORAL EVERY 6 HOURS PRN
Status: DISCONTINUED | OUTPATIENT
Start: 2021-10-14 | End: 2021-10-17 | Stop reason: HOSPADM

## 2021-10-14 RX ORDER — ATORVASTATIN CALCIUM 40 MG/1
40 TABLET, FILM COATED ORAL NIGHTLY
Status: DISCONTINUED | OUTPATIENT
Start: 2021-10-14 | End: 2021-10-17 | Stop reason: HOSPADM

## 2021-10-14 RX ORDER — SODIUM CHLORIDE 0.9 % (FLUSH) 0.9 %
10 SYRINGE (ML) INJECTION EVERY 12 HOURS SCHEDULED
Status: DISCONTINUED | OUTPATIENT
Start: 2021-10-14 | End: 2021-10-17 | Stop reason: HOSPADM

## 2021-10-14 RX ORDER — LOSARTAN POTASSIUM 25 MG/1
100 TABLET ORAL DAILY
Status: DISCONTINUED | OUTPATIENT
Start: 2021-10-15 | End: 2021-10-17 | Stop reason: HOSPADM

## 2021-10-14 RX ORDER — ASPIRIN 81 MG/1
81 TABLET, CHEWABLE ORAL DAILY
Status: DISCONTINUED | OUTPATIENT
Start: 2021-10-15 | End: 2021-10-17 | Stop reason: HOSPADM

## 2021-10-14 RX ORDER — ACETAMINOPHEN 650 MG/1
650 SUPPOSITORY RECTAL EVERY 6 HOURS PRN
Status: DISCONTINUED | OUTPATIENT
Start: 2021-10-14 | End: 2021-10-17 | Stop reason: HOSPADM

## 2021-10-14 RX ORDER — ONDANSETRON 4 MG/1
4 TABLET, ORALLY DISINTEGRATING ORAL EVERY 8 HOURS PRN
Status: DISCONTINUED | OUTPATIENT
Start: 2021-10-14 | End: 2021-10-17 | Stop reason: HOSPADM

## 2021-10-14 RX ADMIN — LIDOCAINE HYDROCHLORIDE: 20 SOLUTION ORAL; TOPICAL at 23:25

## 2021-10-14 RX ADMIN — CEFTRIAXONE SODIUM 1000 MG: 1 INJECTION, POWDER, FOR SOLUTION INTRAMUSCULAR; INTRAVENOUS at 20:40

## 2021-10-14 RX ADMIN — ONDANSETRON 4 MG: 2 INJECTION INTRAMUSCULAR; INTRAVENOUS at 18:09

## 2021-10-14 RX ADMIN — ASPIRIN 81 MG 324 MG: 81 TABLET ORAL at 20:40

## 2021-10-14 RX ADMIN — ONDANSETRON 4 MG: 4 TABLET, ORALLY DISINTEGRATING ORAL at 23:24

## 2021-10-14 RX ADMIN — SODIUM CHLORIDE 1000 ML: 9 INJECTION, SOLUTION INTRAVENOUS at 18:04

## 2021-10-14 RX ADMIN — ATORVASTATIN CALCIUM 40 MG: 40 TABLET, FILM COATED ORAL at 23:24

## 2021-10-14 RX ADMIN — SODIUM CHLORIDE, PRESERVATIVE FREE 10 ML: 5 INJECTION INTRAVENOUS at 23:10

## 2021-10-14 SDOH — ECONOMIC STABILITY: FOOD INSECURITY: WITHIN THE PAST 12 MONTHS, YOU WORRIED THAT YOUR FOOD WOULD RUN OUT BEFORE YOU GOT MONEY TO BUY MORE.: NEVER TRUE

## 2021-10-14 SDOH — ECONOMIC STABILITY: FOOD INSECURITY: WITHIN THE PAST 12 MONTHS, THE FOOD YOU BOUGHT JUST DIDN'T LAST AND YOU DIDN'T HAVE MONEY TO GET MORE.: NEVER TRUE

## 2021-10-14 ASSESSMENT — PAIN SCALES - GENERAL
PAINLEVEL_OUTOF10: 0

## 2021-10-14 ASSESSMENT — SOCIAL DETERMINANTS OF HEALTH (SDOH): HOW HARD IS IT FOR YOU TO PAY FOR THE VERY BASICS LIKE FOOD, HOUSING, MEDICAL CARE, AND HEATING?: NOT HARD AT ALL

## 2021-10-14 NOTE — ED PROVIDER NOTES
CHIEF COMPLAINT  Shortness of Breath (started feeling bad october 4th, sob , chest pain, )      HISTORY OF PRESENT ILLNESS  Kentrell Potts is a 78 y.o. female with a history of breast cancer, fatty liver, hypertension, hypothyroidism, palpitations, and pyelonephritis who presents to the ED complaining of chest pain and shortness of breath. Patient states that she began feeling bad on October 4. Since that time she has noted shortness of breath, chest pain, and increasing abdominal pain. Patient reports that she has had nausea and decreased appetite. Last bowel movement was yesterday. No sick contacts. Patient reportedly had a near syncopal episode in the lobby: Systolic blood pressure in the 90s. .   No other complaints, modifying factors or associated symptoms. I have reviewed the following from the nursing documentation.     Past Medical History:   Diagnosis Date    [facility] [address]     Cancer ([facility] 09/2017    right breast cancer status post lumpectomy    Edema     Fatty liver     elevated LFT's    Gout     HIGH CHOLESTEROL     HTN (hypertension)     Hypothyroidism     IFG (impaired fasting glucose)     Osteoarthritis     Palpitations     Pyelonephritis 03     Past Surgical History:   Procedure Laterality Date    APPENDECTOMY  1954    BREAST SURGERY      CATARACT REMOVAL WITH IMPLANT Left [id number]    CHOLECYSTECTOMY  12/1970    EPIDURAL STEROID INJECTION Right 11/12/2019    RIGHT LUMBAR FIVE SACRAL ONE EPIDURAL STEROID INJECTION SITE CONFIRMED BY FLUOROSCOPY performed by Melody Bhatt MD at [address] [place] [address] Right 2007    cataract     HYSTERECTOMY  5/1971    JOINT REPLACEMENT Left 2001    hip    JOINT REPLACEMENT Right 2002    hip    OTHER SURGICAL HISTORY Right 09/27/2017    LUMPECTOMY, NEEDLE LOCALIZATION AND SENTINEL NODERIGHT LUMPECTOMY, NEEDLE LOCALIZATION AND SENTINEL NODE    THYROIDECTOMY  1968    TONSILLECTOMY  1945     Family History Problem Relation Age of Onset   Elena Maradiaga Cancer Mother         breast / liver CA    Other Mother         pernicious anemia    Cancer Brother         prostate cancer    Alcohol Abuse Sister      Social History     Socioeconomic History    Marital status:      Spouse name: Not on file    Number of children: Not on file    Years of education: Not on file    Highest education level: Not on file   Occupational History    Not on file   Tobacco Use    Smoking status: Never Smoker    Smokeless tobacco: Never Used   Substance and Sexual Activity    Alcohol use: Yes     Alcohol/week: 5.0 - 6.0 standard drinks     Types: 5 - 6 Glasses of wine per week     Comment: per week    Drug use: No    Sexual activity: Yes     Partners: Male   Other Topics Concern    Not on file   Social History Narrative    Not on file     Social Determinants of Health     Financial Resource Strain: Low Risk     Difficulty of Paying Living Expenses: Not hard at all   Food Insecurity: No Food Insecurity    Worried About Running Out of Food in the Last Year: Never true    [address] in the Last Year: Never true   Transportation Needs:     Lack of Transportation (Medical):      Lack of Transportation (Non-Medical):    Physical Activity:     Days of Exercise per Week:     Minutes of Exercise per Session:    Stress:     Feeling of Stress :    Social Connections:     Frequency of Communication with Friends and Family:     Frequency of Social Gatherings with Friends and Family:     Attends Synagogue Services:     Active Member of Clubs or Organizations:     Attends Club or Organization Meetings:     Marital Status:    Intimate Partner Violence:     Fear of Current or Ex-Partner:     Emotionally Abused:     Physically Abused:     Sexually Abused:      Current Facility-Administered Medications   Medication Dose Route Frequency Provider Last Rate Last Admin    ondansetron (ZOFRAN) injection 4 mg  4 mg IntraVENous Once Joya Alvarez DO        0.9 % sodium chloride bolus  1,000 mL IntraVENous Once Joya Alvarez DO         Current Outpatient Medications   Medication Sig Dispense Refill    traMADol (ULTRAM) 50 MG tablet Take 50 mg by mouth 3 times daily.  levothyroxine (SYNTHROID) 125 MCG tablet TAKE 1 TABLET BY MOUTH DAILY FOR THYROID 90 tablet 1    cloNIDine (CATAPRES) 0.1 MG tablet TAKE 1/2 TABLET BY MOUTH DAILY FOR HIGH BLOOD PRESSURE 45 tablet 1    atorvastatin (LIPITOR) 40 MG tablet TAKE 1/2 TABLET BY MOUTH DAILY FOR HIGH CHOLESTEROL 45 tablet 1    irbesartan (AVAPRO) 300 MG tablet TAKE 1 TABLET BY MOUTH DAILY FOR HIGH BLOOD PRESSURE 90 tablet 1    metoprolol succinate (TOPROL XL) 50 MG extended release tablet TAKE 1 TABLET BY MOUTH EVERY DAY FOR HIGH BLOOD PRESSURE 90 tablet 1    meloxicam (MOBIC) 7.5 MG tablet TAKE 1 TABLET BY MOUTH DAILY AS NEEDED FOR ARTHRITIS 90 tablet 1    oxybutynin (DITROPAN) 5 MG tablet TAKE 1 TABLET BY MOUTH TWICE DAILY FOR BLADDER 180 tablet 1    Multiple Vitamins-Minerals (THERAPEUTIC MULTIVITAMIN-MINERALS) tablet Take 1 tablet by mouth daily      anastrozole (ARIMIDEX) 1 MG tablet TK 1 T PO QD  4    Acetaminophen (TYLENOL 8 HOUR PO) Take 625 mg by mouth 3 times daily        Allergies   Allergen Reactions    Lisinopril      Cough.  Vasotec      Cough. REVIEW OF SYSTEMS  10 systems reviewed, pertinent positives per HPI otherwise noted to be negative. PHYSICAL EXAM  BP (!) 97/58   Pulse 80   Temp 98 °F (36.7 °C) (Oral)   Resp 20   Wt 160 lb (72.6 kg)   SpO2 100%   BMI 27.90 kg/m²   GENERAL APPEARANCE: Cooperative. No acute distress. Somnolent but easily arousable. HEAD: Normocephalic. Atraumatic. EYES: PERRL. EOM's grossly intact. .  ENT: Mucous membranes are moist.   NECK: Supple, trachea midline. HEART: RRR. Normal S1, S2. No murmurs, rubs or gallops. LUNGS: Respirations unlabored. CTAB. Good air exchange. No wheezes, rales, or rhonchi.   Speaking comfortably in full sentences. ABDOMEN: Soft. Non-distended. Diffuse abdominal tenderness to palpation. No guarding or rebound. Normal Bowel sounds. EXTREMITIES: No peripheral edema. MAEE. No acute deformities. SKIN: Warm and dry. No acute rashes. NEUROLOGICAL: Alert and oriented X 3. CN II-XII intact. No gross facial drooping. Strength 5/5, sensation intact. No pronator drift. Normal coordination. Gait not tested. PSYCHIATRIC: Normal mood and affect. LABS  I have reviewed all labs for this visit.    Results for orders placed or performed during the hospital encounter of 10/14/21   COVID-19, Rapid    Specimen: Nasopharyngeal Swab   Result Value Ref Range    SARS-CoV-2, NAAT Not Detected Not Detected   CBC Auto Differential   Result Value Ref Range    WBC 7.9 4.0 - 11.0 K/uL    RBC 3.88 (L) 4.00 - 5.20 M/uL    Hemoglobin 14.9 12.0 - 16.0 g/dL    Hematocrit 42.5 36.0 - 48.0 %    .7 (H) 80.0 - 100.0 fL    MCH 38.5 (H) 26.0 - 34.0 pg    MCHC 35.1 31.0 - 36.0 g/dL    RDW 13.9 12.4 - 15.4 %    Platelets 779 866 - 930 K/uL    MPV 6.9 5.0 - 10.5 fL    Neutrophils % 72.9 %    Lymphocytes % 15.1 %    Monocytes % 11.1 %    Eosinophils % 0.6 %    Basophils % 0.3 %    Neutrophils Absolute 5.8 1.7 - 7.7 K/uL    Lymphocytes Absolute 1.2 1.0 - 5.1 K/uL    Monocytes Absolute 0.9 0.0 - 1.3 K/uL    Eosinophils Absolute 0.0 0.0 - 0.6 K/uL    Basophils Absolute 0.0 0.0 - 0.2 K/uL   Lipase   Result Value Ref Range    Lipase 92.0 (H) 13.0 - 60.0 U/L   Comprehensive Metabolic Panel   Result Value Ref Range    Sodium 140 136 - 145 mmol/L    Potassium 4.7 3.5 - 5.1 mmol/L    Chloride 105 99 - 110 mmol/L    CO2 22 21 - 32 mmol/L    Anion Gap 13 3 - 16    Glucose 107 (H) 70 - 99 mg/dL    BUN 53 (H) 7 - 20 mg/dL    CREATININE 1.1 0.6 - 1.2 mg/dL    GFR Non- 47 (A) >60    GFR  57 (A) >60    Calcium 13.8 (HH) 8.3 - 10.6 mg/dL    Total Protein 7.0 6.4 - 8.2 g/dL    Albumin 4.2 3.4 - 5.0 g/dL Albumin/Globulin Ratio 1.5 1.1 - 2.2    Total Bilirubin 0.8 0.0 - 1.0 mg/dL    Alkaline Phosphatase 107 40 - 129 U/L    ALT 45 (H) 10 - 40 U/L    AST 42 (H) 15 - 37 U/L    Globulin 2.8 Not Established g/dL   Urinalysis   Result Value Ref Range    Color, UA Yellow Straw/Yellow    Clarity, UA SL CLOUDY (A) Clear    Glucose, Ur Negative Negative mg/dL    Bilirubin Urine Negative Negative    Ketones, Urine Negative Negative mg/dL    Specific Gravity, UA 1.020 1.005 - 1.030    Blood, Urine Negative Negative    pH, UA 5.0 5.0 - 8.0    Protein, UA Negative Negative mg/dL    Urobilinogen, Urine 0.2 <2.0 E.U./dL    Nitrite, Urine POSITIVE (A) Negative    Leukocyte Esterase, Urine Negative Negative    Microscopic Examination YES     Urine Type NotGiven    Troponin   Result Value Ref Range    Troponin 0.02 (H) <0.01 ng/mL   Blood Gas, Arterial   Result Value Ref Range    pH, Arterial 7.492 (H) 7.350 - 7.450    pCO2, Arterial 22.1 (L) 35.0 - 45.0 mmHg    pO2, Arterial 130.8 (H) 75.0 - 108.0 mmHg    HCO3, Arterial 16.5 (L) 21.0 - 29.0 mmol/L    Base Excess, Arterial -4.5 (L) -3.0 - 3.0 mmol/L    Hemoglobin, Art, Extended 14.4 12.0 - 16.0 g/dL    O2 Sat, Arterial 98.7 >92 %    Carboxyhgb, Arterial 0.6 0.0 - 1.5 %    Methemoglobin, Arterial 0.5 <1.5 %    TCO2, Arterial 17.2 Not Established mmol/L    O2 Therapy Unknown    Blood gas, venous   Result Value Ref Range    pH, Demetrius 7.338 (L) 7.350 - 7.450    pCO2, Demetrius 43.7 40.0 - 50.0 mmHg    pO2, Demetrius 31.3 25 - 40 mmHg    HCO3, Venous 22.9 (L) 23.0 - 29.0 mmol/L    Base Excess, Demetrius -2.9 -3.0 - 3.0 mmol/L    O2 Sat, Demetrius 60 Not Established %    Carboxyhemoglobin 1.8 (H) 0.0 - 1.5 %    MetHgb, Demetrius 0.4 <1.5 %    TC02 (Calc), Demetrius 24 Not Established mmol/L    O2 Therapy Unknown    Sample possible blood bank testing   Result Value Ref Range    Specimen Status ROSANNE    Microscopic Urinalysis   Result Value Ref Range    WBC, UA 10-20 (A) 0 - 5 /HPF    RBC, UA 0-2 0 - 4 /HPF    Epithelial Cells, UA 2-5 0 - 5 /HPF    Renal Epithelial, UA 2-5 (A) 0 - 1 /HPF    Bacteria, UA 3+ (A) None Seen /HPF       EKG  The Ekg interpreted by myself  normal sinus rhythm with a rate of 80  Axis is   Normal  QTc is  normal  Intervals and Durations are unremarkable. No specific ST-T wave changes appreciated. No evidence of acute ischemia. No significant change from prior EKG dated 12/15/2014. Cardiac Monitoring: No ectopy. Normal rate. RADIOLOGY  X-RAYS:  I have reviewed radiologic plain film image(s). ALL OTHER NON-PLAIN FILM IMAGES SUCH AS CT, ULTRASOUND AND MRI HAVE BEEN READ BY THE RADIOLOGIST. CT CHEST ABDOMEN PELVIS WO CONTRAST   Final Result   Chest CT:      No acute abnormality detected. 5 mm nodule within the periphery the right upper lobe. See recommendations   below. Abdomen and pelvis CT:      No acute abnormality detected. Extensive diverticulosis of the large bowel, mainly in the region of the   sigmoid colon, but without CT evidence of diverticulitis. RECOMMENDATIONS:   Fleischner Society guidelines for follow-up and management of incidentally   detected pulmonary nodules:      Single Solid Nodule:      Nodule size less than 6 mm   In a low-risk patient, no routine follow-up. In a high-risk patient, optional CT at 12 months. - Low risk patients include individuals with minimal or absent history of   smoking and other known risk factors. - High risk patients include individuals with a history or smoking or known   risk factors. Radiology 2017 http://pubs. rsna.org/doi/full/10.1148/radiol. 3487682821         XR CHEST PORTABLE   Final Result   No acute cardiopulmonary disease. Rechecks: Physical assessment performed. 1834: Vital stable. Pain 0/10.       HEART SCORE:    History: +1 for moderate suspicion  EKG: +1 for nonspecific changes   Age: +4 for age >65 years  Risk factors (includes HLD, HTN, DM, tobacco use, obesity, and +FHx): +1 for 1-2 risk factors  Initial troponin: +1 for troponin 1-3x normal limit    Heart score: 6  . This falls under the following category: Score of 4-6, which indicates low/moderate risk for major adverse cardiac event and supports observation with repeated troponins and/or non-invasive testing        ED COURSE/MDM  Patient seen and evaluated. Old records reviewed. Labs and imaging reviewed and results discussed with patient. Patient was given ns/zofran/aspirin in the ED with good symptomatic relief. Patient was reassessed as noted above . No acute pathology was noted and pt is safe for discharge home to follow up with. Plan of care discussed with patient and family. Patient and family in agreement with plan. Patient was given scripts for the following medications. I counseled patient how to take these medications. New Prescriptions    No medications on file       CLINICAL IMPRESSION  1. Chest pain, unspecified type    2. Abdominal pain, unspecified abdominal location    3. Shortness of breath    4. Fatigue, unspecified type        Blood pressure (!) 97/58, pulse 80, temperature 98 °F (36.7 °C), temperature source Oral, resp. rate 20, weight 160 lb (72.6 kg), SpO2 100 %. Priscila Carbajal was admitted in stable condition.          [facility],   10/14/21 [id number] [facility],   10/18/21 1533

## 2021-10-14 NOTE — PROGRESS NOTES
Parminder Dove 78 y.o. female presents today for an Established patient for   Chief Complaint   Patient presents with    Hypertension    Hyperlipidemia    Dizziness            ASSESSMENT/PLAN    1. Chest pain, unspecified type            Patient with persistence of chest pain over the last several days described as mid back mid chest extending from her throat down to the upper abdomen. Must consider possibility of angina also be evaluated for thoracic aneurysm. Patient needs evaluation stat. Should be sent the emergency room for evaluation    2. PHILLIPS (dyspnea on exertion)             Shortness of breath with only 3 steps at a time at her house. Consider cardiac in nature. Patient needs evaluation stat. Sent to the emergency room for evaluation    3. Essential hypertension             Stable continue to monitor    4. Thrombocytopenia ([address]                   Continue to monitor    5. Elevated lipoprotein(a)                      No recent laboratory studies    6. IFG (impaired fasting glucose)                     No recent laboratory studies    7. Hypothyroidism, unspecified type                  No recent laboratory studies    8. Macrocytic                Baseline. 9. Sacroiliitis ([address]                Status post epidural injections. 10. Need for influenza vaccination                 Health maintenance. Return if symptoms worsen or fail to improve. HPI:  Patient presents today in a wheelchair with her daughter Nehemiah Batter: She relates 10-20 19 she had an epidural injection by Dr. Sahara Nowak. She seemed to do well until September of this year when she noted the onset of low back pain. States she underwent 2 lumbar epidural injections. 10/4/2021 complaint of pain about her mid back with radiation around into her chest and upper abdomen. This extends up to her throat. She has a sense of dizziness and weakness. Pain may last 15 minutes or bit longer. There is nausea but no vomiting.   Constipation but stool softeners been helpful. She no shortness of breath has to rest going up only 3 steps at a time at her house. She is unsure if Tums are somewhat helpful. She denies passage problem. Risk factors for coronary artery disease include high blood pressure elevated lipids. Denies family history. No history of tobacco use. Review last office visit with me: 3/2/2020: Patient with HTN, hyperlipidemia, IFG, hypothyroid, thrombocytopenia, insomnia, fatigue likely secondary to insomnia. Trial of melatonin. Macrocytosis. Review laboratory data 4/21. Chemistry panel: BUN 28. Creatinine 0.8. Lipid panel: Total cholesterol: 129. LDL cholesterol 43. Triglycerides: 160. A1c 4.9.  CBC: .5. RBC 3.98. Folate greater than 20. B12 1274. DEXA scan 4/27/2021: Normal.  Mammogram: 4/27/2021 benign. Health maintenance: Shingles, Tdap, AWV, influenza vaccine.       Results for orders placed or performed during the hospital encounter of 04/09/21   CBC   Result Value Ref Range    WBC 6.0 4.0 - 11.0 K/uL    RBC 3.95 (L) 4.00 - 5.20 M/uL    Hemoglobin 14.8 12.0 - 16.0 g/dL    Hematocrit 43.2 36.0 - 48.0 %    .5 (H) 80.0 - 100.0 fL    MCH 37.5 (H) 26.0 - 34.0 pg    MCHC 34.2 31.0 - 36.0 g/dL    RDW 12.5 12.4 - 15.4 %    Platelets 373 716 - 994 K/uL    MPV 8.6 5.0 - 10.5 fL   Hemoglobin A1C   Result Value Ref Range    Hemoglobin A1C 4.9 See comment %    eAG 93.9 mg/dL   T4   Result Value Ref Range    T4, Total 9.1 4.5 - 10.9 ug/dL   Comprehensive Metabolic Panel   Result Value Ref Range    Sodium 141 136 - 145 mmol/L    Potassium 4.6 3.5 - 5.1 mmol/L    Chloride 107 99 - 110 mmol/L    CO2 22 21 - 32 mmol/L    Anion Gap 12 3 - 16    Glucose 100 (H) 70 - 99 mg/dL    BUN 28 (H) 7 - 20 mg/dL    CREATININE 0.8 0.6 - 1.2 mg/dL    GFR Non-African American >60 >60    GFR African American >60 >60    Calcium 9.9 8.3 - 10.6 mg/dL    Total Protein 7.1 6.4 - 8.2 g/dL    Albumin 4.3 3.4 - 5.0 g/dL    Albumin/Globulin Ratio 1.5 1.1 - 2.2    Total Bilirubin 0.7 0.0 - 1.0 mg/dL    Alkaline Phosphatase 99 40 - 129 U/L    ALT 31 10 - 40 U/L    AST 37 15 - 37 U/L    Globulin 2.8 g/dL   TSH with Reflex   Result Value Ref Range    TSH 0.05 (L) 0.27 - 4.20 uIU/mL   Lipid Panel   Result Value Ref Range    Cholesterol, Total 129 0 - 199 mg/dL    Triglycerides 160 (H) 0 - 150 mg/dL    HDL 54 40 - 60 mg/dL    LDL Calculated 43 <100 mg/dL    VLDL Cholesterol Calculated 32 Not Established mg/dL   Vitamin B12 & Folate   Result Value Ref Range    Vitamin B-12 1274 (H) 211 - 911 pg/mL    Folate >20.00 4.78 - 24.20 ng/mL   T4, Free   Result Value Ref Range    T4 Free 1.6 0.9 - 1.8 ng/dL   T3   Result Value Ref Range    T3, Total 1.05 0.80 - 2.00 ng/mL              ROS:  Review of Systems   Constitutional: negative   HENT: negative   EYES: negative   Respiratory: negative   Gastrointestinal: negative   Endocrine: negative   Musculoskeletal: Low back pain. Status post epidural injections. Skin: negative   Allergic/Immunological: negative   Hematological: negative   Psychiatric/Behavorial: negative   CV: Chest pain. PHILLIPS.  CNS: negative   :Negative   S/E:Negative  Renal: Negative     Physical Exam:  Head/neck: Ears: Normal TM. No obstruction. Throat: Mask. Not examined. Thyroid is not palpable. .  Neck: No lymphadenopathy. Eyes: EOMI, PERRLA with no nystagmus  Lungs: Clear to auscultation. CV: S1-S2 normal.  KATHERINE murmur. Carotid: No bruit. Abdominal Examination: Bowel sounds present. Soft nontender. No mass no guarding or   rebound. Spine/extremities: Bilateral anterior tibial and ankle edema. No tenderness to palpation. Skin: No rash  CNS: Patient is alert, cooperative, moves all 4 limbs, ambulates without difficulty, light touch normal.   Good historian. Good orientation. Blood pressure 114/70, pulse 87, SpO2 98 %.          Shelby Parikh MD

## 2021-10-15 ENCOUNTER — APPOINTMENT (OUTPATIENT)
Dept: CARDIAC CATH/INVASIVE PROCEDURES | Age: 81
DRG: 287 | End: 2021-10-15
Payer: MEDICARE

## 2021-10-15 LAB
ANION GAP SERPL CALCULATED.3IONS-SCNC: 14 MMOL/L (ref 3–16)
ANION GAP SERPL CALCULATED.3IONS-SCNC: 15 MMOL/L (ref 3–16)
BUN BLDV-MCNC: 44 MG/DL (ref 7–20)
BUN BLDV-MCNC: 51 MG/DL (ref 7–20)
CALCIUM SERPL-MCNC: 11.2 MG/DL (ref 8.3–10.6)
CALCIUM SERPL-MCNC: 11.7 MG/DL (ref 8.3–10.6)
CHLORIDE BLD-SCNC: 105 MMOL/L (ref 99–110)
CHLORIDE BLD-SCNC: 110 MMOL/L (ref 99–110)
CHOLESTEROL, TOTAL: 82 MG/DL (ref 0–199)
CO2: 19 MMOL/L (ref 21–32)
CO2: 21 MMOL/L (ref 21–32)
CREAT SERPL-MCNC: 0.9 MG/DL (ref 0.6–1.2)
CREAT SERPL-MCNC: 0.9 MG/DL (ref 0.6–1.2)
EKG ATRIAL RATE: 80 BPM
EKG DIAGNOSIS: NORMAL
EKG P AXIS: 15 DEGREES
EKG P-R INTERVAL: 152 MS
EKG Q-T INTERVAL: 348 MS
EKG QRS DURATION: 88 MS
EKG QTC CALCULATION (BAZETT): 401 MS
EKG R AXIS: 31 DEGREES
EKG T AXIS: 41 DEGREES
EKG VENTRICULAR RATE: 80 BPM
GFR AFRICAN AMERICAN: >60
GFR AFRICAN AMERICAN: >60
GFR NON-AFRICAN AMERICAN: 60
GFR NON-AFRICAN AMERICAN: 60
GLUCOSE BLD-MCNC: 87 MG/DL (ref 70–99)
GLUCOSE BLD-MCNC: 88 MG/DL (ref 70–99)
HCT VFR BLD CALC: 40.6 % (ref 36–48)
HDLC SERPL-MCNC: 38 MG/DL (ref 40–60)
HEMOGLOBIN: 14.2 G/DL (ref 12–16)
LDL CHOLESTEROL CALCULATED: 25 MG/DL
LV EF: 58 %
LVEF MODALITY: NORMAL
MCH RBC QN AUTO: 38.4 PG (ref 26–34)
MCHC RBC AUTO-ENTMCNC: 35 G/DL (ref 31–36)
MCV RBC AUTO: 109.7 FL (ref 80–100)
PDW BLD-RTO: 13.7 % (ref 12.4–15.4)
PLATELET # BLD: 140 K/UL (ref 135–450)
PMV BLD AUTO: 6.6 FL (ref 5–10.5)
POTASSIUM REFLEX MAGNESIUM: 4.4 MMOL/L (ref 3.5–5.1)
POTASSIUM SERPL-SCNC: 4.5 MMOL/L (ref 3.5–5.1)
RBC # BLD: 3.7 M/UL (ref 4–5.2)
SODIUM BLD-SCNC: 140 MMOL/L (ref 136–145)
SODIUM BLD-SCNC: 144 MMOL/L (ref 136–145)
TRIGL SERPL-MCNC: 96 MG/DL (ref 0–150)
TROPONIN: 0.01 NG/ML
VLDLC SERPL CALC-MCNC: 19 MG/DL
WBC # BLD: 8.6 K/UL (ref 4–11)

## 2021-10-15 PROCEDURE — 1200000000 HC SEMI PRIVATE

## 2021-10-15 PROCEDURE — 93458 L HRT ARTERY/VENTRICLE ANGIO: CPT | Performed by: INTERNAL MEDICINE

## 2021-10-15 PROCEDURE — 6360000002 HC RX W HCPCS

## 2021-10-15 PROCEDURE — C1894 INTRO/SHEATH, NON-LASER: HCPCS

## 2021-10-15 PROCEDURE — 80061 LIPID PANEL: CPT

## 2021-10-15 PROCEDURE — 99223 1ST HOSP IP/OBS HIGH 75: CPT | Performed by: INTERNAL MEDICINE

## 2021-10-15 PROCEDURE — 2580000003 HC RX 258: Performed by: NURSE PRACTITIONER

## 2021-10-15 PROCEDURE — 6360000004 HC RX CONTRAST MEDICATION

## 2021-10-15 PROCEDURE — C1769 GUIDE WIRE: HCPCS

## 2021-10-15 PROCEDURE — 6360000002 HC RX W HCPCS: Performed by: NURSE PRACTITIONER

## 2021-10-15 PROCEDURE — 6370000000 HC RX 637 (ALT 250 FOR IP): Performed by: NURSE PRACTITIONER

## 2021-10-15 PROCEDURE — 2580000003 HC RX 258: Performed by: INTERNAL MEDICINE

## 2021-10-15 PROCEDURE — 6360000004 HC RX CONTRAST MEDICATION: Performed by: INTERNAL MEDICINE

## 2021-10-15 PROCEDURE — 2709999900 HC NON-CHARGEABLE SUPPLY

## 2021-10-15 PROCEDURE — B2111ZZ FLUOROSCOPY OF MULTIPLE CORONARY ARTERIES USING LOW OSMOLAR CONTRAST: ICD-10-PCS | Performed by: INTERNAL MEDICINE

## 2021-10-15 PROCEDURE — 2500000003 HC RX 250 WO HCPCS

## 2021-10-15 PROCEDURE — 80048 BASIC METABOLIC PNL TOTAL CA: CPT

## 2021-10-15 PROCEDURE — 93010 ELECTROCARDIOGRAM REPORT: CPT | Performed by: INTERNAL MEDICINE

## 2021-10-15 PROCEDURE — 99152 MOD SED SAME PHYS/QHP 5/>YRS: CPT | Performed by: INTERNAL MEDICINE

## 2021-10-15 PROCEDURE — 93458 L HRT ARTERY/VENTRICLE ANGIO: CPT

## 2021-10-15 PROCEDURE — C8929 TTE W OR WO FOL WCON,DOPPLER: HCPCS

## 2021-10-15 PROCEDURE — 36415 COLL VENOUS BLD VENIPUNCTURE: CPT

## 2021-10-15 PROCEDURE — 4A023N7 MEASUREMENT OF CARDIAC SAMPLING AND PRESSURE, LEFT HEART, PERCUTANEOUS APPROACH: ICD-10-PCS | Performed by: INTERNAL MEDICINE

## 2021-10-15 PROCEDURE — 85347 COAGULATION TIME ACTIVATED: CPT

## 2021-10-15 PROCEDURE — 85027 COMPLETE CBC AUTOMATED: CPT

## 2021-10-15 PROCEDURE — 84484 ASSAY OF TROPONIN QUANT: CPT

## 2021-10-15 RX ORDER — SODIUM CHLORIDE 9 MG/ML
25 INJECTION, SOLUTION INTRAVENOUS PRN
Status: DISCONTINUED | OUTPATIENT
Start: 2021-10-15 | End: 2021-10-15 | Stop reason: SDUPTHER

## 2021-10-15 RX ORDER — SODIUM CHLORIDE 9 MG/ML
INJECTION, SOLUTION INTRAVENOUS CONTINUOUS
Status: DISCONTINUED | OUTPATIENT
Start: 2021-10-15 | End: 2021-10-16

## 2021-10-15 RX ORDER — SODIUM CHLORIDE 0.9 % (FLUSH) 0.9 %
5-40 SYRINGE (ML) INJECTION EVERY 12 HOURS SCHEDULED
Status: DISCONTINUED | OUTPATIENT
Start: 2021-10-15 | End: 2021-10-15 | Stop reason: SDUPTHER

## 2021-10-15 RX ORDER — SODIUM CHLORIDE 0.9 % (FLUSH) 0.9 %
5-40 SYRINGE (ML) INJECTION PRN
Status: DISCONTINUED | OUTPATIENT
Start: 2021-10-15 | End: 2021-10-15 | Stop reason: SDUPTHER

## 2021-10-15 RX ORDER — MIDAZOLAM HYDROCHLORIDE 5 MG/ML
INJECTION INTRAMUSCULAR; INTRAVENOUS
Status: COMPLETED | OUTPATIENT
Start: 2021-10-15 | End: 2021-10-15

## 2021-10-15 RX ORDER — SODIUM CHLORIDE 450 MG/100ML
INJECTION, SOLUTION INTRAVENOUS CONTINUOUS
Status: DISCONTINUED | OUTPATIENT
Start: 2021-10-15 | End: 2021-10-16

## 2021-10-15 RX ORDER — SODIUM CHLORIDE 9 MG/ML
25 INJECTION, SOLUTION INTRAVENOUS PRN
Status: DISCONTINUED | OUTPATIENT
Start: 2021-10-15 | End: 2021-10-17 | Stop reason: HOSPADM

## 2021-10-15 RX ORDER — ACETAMINOPHEN 325 MG/1
650 TABLET ORAL EVERY 4 HOURS PRN
Status: DISCONTINUED | OUTPATIENT
Start: 2021-10-15 | End: 2021-10-17 | Stop reason: HOSPADM

## 2021-10-15 RX ORDER — FENTANYL CITRATE 50 UG/ML
INJECTION, SOLUTION INTRAMUSCULAR; INTRAVENOUS
Status: COMPLETED | OUTPATIENT
Start: 2021-10-15 | End: 2021-10-15

## 2021-10-15 RX ORDER — HEPARIN SODIUM 1000 [USP'U]/ML
INJECTION, SOLUTION INTRAVENOUS; SUBCUTANEOUS
Status: COMPLETED | OUTPATIENT
Start: 2021-10-15 | End: 2021-10-15

## 2021-10-15 RX ADMIN — HEPARIN SODIUM 5000 UNITS: 1000 INJECTION, SOLUTION INTRAVENOUS; SUBCUTANEOUS at 15:31

## 2021-10-15 RX ADMIN — PERFLUTREN 1.65 MG: 6.52 INJECTION, SUSPENSION INTRAVENOUS at 10:56

## 2021-10-15 RX ADMIN — ANASTROZOLE 1 MG: 1 TABLET ORAL at 10:58

## 2021-10-15 RX ADMIN — SODIUM CHLORIDE, PRESERVATIVE FREE 10 ML: 5 INJECTION INTRAVENOUS at 11:00

## 2021-10-15 RX ADMIN — ASPIRIN 81 MG: 81 TABLET, CHEWABLE ORAL at 10:58

## 2021-10-15 RX ADMIN — FENTANYL CITRATE 50 MCG: 50 INJECTION, SOLUTION INTRAMUSCULAR; INTRAVENOUS at 15:31

## 2021-10-15 RX ADMIN — OXYBUTYNIN CHLORIDE 5 MG: 5 TABLET ORAL at 21:46

## 2021-10-15 RX ADMIN — ATORVASTATIN CALCIUM 40 MG: 40 TABLET, FILM COATED ORAL at 21:46

## 2021-10-15 RX ADMIN — ENOXAPARIN SODIUM 40 MG: 40 INJECTION SUBCUTANEOUS at 10:59

## 2021-10-15 RX ADMIN — MIDAZOLAM HYDROCHLORIDE 1 MG: 5 INJECTION INTRAMUSCULAR; INTRAVENOUS at 15:31

## 2021-10-15 RX ADMIN — SODIUM CHLORIDE: 4.5 INJECTION, SOLUTION INTRAVENOUS at 11:07

## 2021-10-15 RX ADMIN — LEVOTHYROXINE SODIUM 125 MCG: 0.12 TABLET ORAL at 10:59

## 2021-10-15 RX ADMIN — SODIUM CHLORIDE: 9 INJECTION, SOLUTION INTRAVENOUS at 18:46

## 2021-10-15 RX ADMIN — OXYBUTYNIN CHLORIDE 5 MG: 5 TABLET ORAL at 10:59

## 2021-10-15 RX ADMIN — METOPROLOL SUCCINATE 50 MG: 50 TABLET, EXTENDED RELEASE ORAL at 10:59

## 2021-10-15 RX ADMIN — SODIUM CHLORIDE: 9 INJECTION, SOLUTION INTRAVENOUS at 16:38

## 2021-10-15 ASSESSMENT — PAIN DESCRIPTION - FREQUENCY: FREQUENCY: INTERMITTENT

## 2021-10-15 ASSESSMENT — PAIN DESCRIPTION - LOCATION: LOCATION: ABDOMEN

## 2021-10-15 ASSESSMENT — PAIN SCALES - GENERAL
PAINLEVEL_OUTOF10: 0
PAINLEVEL_OUTOF10: 5
PAINLEVEL_OUTOF10: 5

## 2021-10-15 ASSESSMENT — PAIN DESCRIPTION - DESCRIPTORS: DESCRIPTORS: CONSTANT;SHARP

## 2021-10-15 ASSESSMENT — PAIN DESCRIPTION - PAIN TYPE: TYPE: ACUTE PAIN

## 2021-10-15 ASSESSMENT — PAIN DESCRIPTION - ONSET: ONSET: OTHER (COMMENT)

## 2021-10-15 NOTE — PROGRESS NOTES
Physical Therapy  Orders received and chart reviewed. Attempted to see pt for PT evaluation. Pt currently off floor for cardiac cath. Will re-attempt as schedule permits. Thanks.   Héctor Lynn PT, DPT

## 2021-10-15 NOTE — PROGRESS NOTES
4 Eyes Skin Assessment     The patient is being assess for   Admission    I agree that 2 RN's have performed a thorough Head to Toe Skin Assessment on the patient. ALL assessment sites listed below have been assessed. Areas assessed for pressure by both nurses:   [x]   Head, Face, and Ears   [x]   Shoulders, Back, and Chest, Abdomen  [x]   Arms, Elbows, and Hands   [x]   Coccyx, Sacrum, and Ischium  [x]   Legs, Feet, and Heels        Skin Assessed Under all Medical Devices by both nurses:  None Present. All Mepilex Borders were peeled back and area peeked at by both nurses:  No: None Present. Please list where Mepilex Borders are located:  None Present. **SHARE this note so that the co-signing nurse is able to place an eSignature**    Co-signer eSignature: Electronically signed by Nuzhat Qureshi RN on 10/15/21 at 2:48 AM EDT    Does the Patient have Skin Breakdown related to pressure? No            Harry Prevention initiated:  N/A.   Wound Care Orders initiated:  N/A.      WOC nurse consulted for Pressure Injury (Stage 3,4, Unstageable, DTI, NWPT, Complex wounds)and New or Established Ostomies:  No      Primary Nurse eSignature: Electronically signed by Destiny Daniels RN on 10/15/21 at 1:58 AM EDT

## 2021-10-15 NOTE — PROGRESS NOTES
[place]side [place] [facility] light and bedside table within reach. No needs voiced at this time. Bed in lowest position, brakes locked. Chair alarm on and in place. Cath site remains clean dry and intact, bruising noted, site remains soft.

## 2021-10-15 NOTE — PROGRESS NOTES
Hospitalist Progress Note      PCP: Tootie Rouse MD    Date of Admission: 10/14/2021    Chief Complaint: Shortness of breath    Hospital Course: This 69-year-old female with a past medical history of hypertension, hyperlipidemia, hypothyroidism, breast cancer admitted with a dyspnea since 10/4/2021, in the emergency room patient was noted to have elevated troponin I, low blood pressure    Subjective: Patient continues to complain of shortness of breath since 10/4/2021 she was seen by primary care doctor and recommended to go to the emergency room patient denies any chest pain no nausea vomiting she lives home alone she wants to be full code. Medications:  Reviewed    Infusion Medications    sodium chloride 150 mL/hr at 10/15/21 1107    sodium chloride       Scheduled Medications    anastrozole  1 mg Oral Daily    [Held by provider] losartan  100 mg Oral Daily    levothyroxine  125 mcg Oral Daily    metoprolol succinate  50 mg Oral Daily    therapeutic multivitamin-minerals  1 tablet Oral Daily    oxybutynin  5 mg Oral BID    sodium chloride flush  10 mL IntraVENous 2 times per day    aspirin  81 mg Oral Daily    enoxaparin  40 mg SubCUTAneous Daily    atorvastatin  40 mg Oral Nightly     PRN Meds: sodium chloride flush, sodium chloride, ondansetron **OR** ondansetron, acetaminophen **OR** acetaminophen, magnesium hydroxide, nitroglycerin      Intake/Output Summary (Last 24 hours) at 10/15/2021 1403  Last data filed at 10/15/2021 1130  Gross per 24 hour   Intake 1000 ml   Output 450 ml   Net 550 ml       Physical Exam Performed:    /74   Pulse 69   Temp 98.7 °F (37.1 °C) (Oral)   Resp 16   Ht 5' 3\" (1.6 m)   Wt 160 lb 1.6 oz (72.6 kg)   SpO2 99%   BMI 28.36 kg/m²     General appearance: No apparent distress, appears stated age and cooperative. HEENT: Pupils equal, round, and reactive to light. Conjunctivae/corneas clear. Neck: Supple, with full range of motion.  No jugular venous distention. Trachea midline. Respiratory:  Normal respiratory effort. Clear to auscultation, bilaterally without Rales/Wheezes/Rhonchi. Cardiovascular: Regular rate and rhythm with normal S1/S2 without murmurs, rubs or gallops. Abdomen: Soft, non-tender, non-distended with normal bowel sounds. Musculoskeletal: No clubbing, cyanosis or edema bilaterally. Full range of motion without deformity. Skin: Skin color, texture, turgor normal.  No rashes or lesions. Neurologic:  Neurovascularly intact without any focal sensory/motor deficits. Cranial nerves: II-XII intact, grossly non-focal.  Psychiatric: Alert and oriented, thought content appropriate, normal insight  Capillary Refill: Brisk,3 seconds, normal   Peripheral Pulses: +2 palpable, equal bilaterally       Labs:   Recent Labs     10/14/21  1651 10/15/21  0728   WBC 7.9 8.6   HGB 14.9 14.2   HCT 42.5 40.6    140     Recent Labs     10/14/21  1651 10/15/21  0728 10/15/21  1329    144 140   K 4.7 4.4 4.5    110 105   CO2 22 19* 21   BUN 53* 51* 44*   CREATININE 1.1 0.9 0.9   CALCIUM 13.8* 11.7* 11.2*     Recent Labs     10/14/21  1651   AST 42*   ALT 45*   BILITOT 0.8   ALKPHOS 107     No results for input(s): INR in the last 72 hours. Recent Labs     10/14/21  1651 10/14/21  2300 10/15/21  0728   TROPONINI 0.02* 0.02* 0.01       Urinalysis:      Lab Results   Component Value Date    NITRU POSITIVE 10/14/2021    WBCUA 10-20 10/14/2021    BACTERIA 3+ 10/14/2021    RBCUA 0-2 10/14/2021    BLOODU Negative 10/14/2021    SPECGRAV 1.020 10/14/2021    GLUCOSEU Negative 10/14/2021       Radiology:  CT CHEST ABDOMEN PELVIS WO CONTRAST   Final Result   Chest CT:      No acute abnormality detected. 5 mm nodule within the periphery the right upper lobe. See recommendations   below. Abdomen and pelvis CT:      No acute abnormality detected.       Extensive diverticulosis of the large bowel, mainly in the region of the   sigmoid colon, but without CT evidence of diverticulitis. RECOMMENDATIONS:   Fleischner Society guidelines for follow-up and management of incidentally   detected pulmonary nodules:      Single Solid Nodule:      Nodule size less than 6 mm   In a low-risk patient, no routine follow-up. In a high-risk patient, optional CT at 12 months. - Low risk patients include individuals with minimal or absent history of   smoking and other known risk factors. - High risk patients include individuals with a history or smoking or known   risk factors. Radiology 2017 http://pubs. rsna.org/doi/full/10.1148/radiol. 9327562940         XR CHEST PORTABLE   Final Result   No acute cardiopulmonary disease. Assessment/Plan:    Active Hospital Problems    Diagnosis     Chest pain [R07.9]      1. This 25-year-old female admitted with a dyspnea on exertion slightly elevated troponin I cardiology consulted continue with aspirin, beta-blocker, statin. 2D echo pending. 2.  Hypercalcemia on admission improving with hydration patient with a history of breast cancer questionable recurrent malignancy monitor closely. PTH, 1, 20 5D hydroxy vitamin D pending. 3.  Hypertension controlled continue with home medication. 4.  Hypothyroidism continue with the Synthroid  5. History of breast cancer status post lumpectomy 9/2017 stable continue with Arimidex.     DVT Prophylaxis: Lovenox subcu  Diet: Diet NPO  Code Status: Full Code    PT/OT Eval Status:    Marvel Lozada MD

## 2021-10-15 NOTE — PROGRESS NOTES
Brief Pre-Op Note/Sedation Assessment      Wing Llanos  1940  [id number]  3:21 PM    Planned Procedure: Cardiac Catheterization Procedure  Post Procedure Plan: Return to same level of care  Consent: I have discussed with the patient and/or the patient representative the indication, alternatives, and the possible risks and/or complications of the planned procedure and the anesthesia methods. The patient and/or patient representative appear to understand and agree to proceed. DISCUSSION OF CARDIAC CATHETERIZATION PROCEDURES: The procedures, indications, risks and alternatives have been discussed with the patient and, as appropriate, with the patient's guardian . Risks discussed included, but are not limited to, bleeding, development of blood clots/emboli, damage to blood vessels, renal failure, malignant cardiac arrhythmias, stroke, heart attack, emergent coronary bypass surgery, death, dye allergy. The patient (and guardian as appropriate) expressed understanding of the aforementioned and wished to proceed. Chief Complaint:   NSTEMI      Indications for Cath Procedure:  1. Presentation:  ACS > 24 hrs  2. Anginal Classification within 2 weeks:  CCS IV - Inability to perform any activity without angina or angina at rest, i.e., severe limitation  3. Angina Symptoms Assessment:  Typical Chest Pain  4. Heart Failure Class within last 2 weeks:  No symptoms  5. Cardiovascular Instability:  No    Prior Ischemic Workup/Eval:  1. Pre-Procedural Medications: Yes: Aspirin, Beta Blockers and STATIN  2. Stress Test Completed? No    Does Patient need surgery? Cath Valve Surgery:  No    Pre-Procedure Medical History:  Vital Signs:  /74   Pulse 69   Temp 98.7 °F (37.1 °C) (Oral)   Resp 16   Ht 5' 3\" (1.6 m)   Wt 160 lb 1.6 oz (72.6 kg)   SpO2 99%   BMI 28.36 kg/m²     Allergies: Allergies   Allergen Reactions    Lisinopril      Cough.  Vasotec      Cough.        Medications: Current Facility-Administered Medications   Medication Dose Route Frequency Provider Last Rate Last Admin    0.45 % sodium chloride infusion   IntraVENous Continuous Lars Sena  mL/hr at 10/15/21 1107 New Bag at 10/15/21 1107    anastrozole (ARIMIDEX) tablet 1 mg  1 mg Oral Daily Rashid Alonso, APRN - NP   1 mg at 10/15/21 1058    [Held by provider] losartan (COZAAR) tablet 100 mg  100 mg Oral Daily Rashid Morita, APRN - NP        levothyroxine (SYNTHROID) tablet 125 mcg  125 mcg Oral Daily Rashid Morita, APRN - NP   125 mcg at 10/15/21 1059    metoprolol succinate (TOPROL XL) extended release tablet 50 mg  50 mg Oral Daily Rashid Morita, APRN - NP   50 mg at 10/15/21 1059    therapeutic multivitamin-minerals 1 tablet  1 tablet Oral Daily Rashid Morisumeet, APRN - NP        oxybutynin (DITROPAN) tablet 5 mg  5 mg Oral BID Rashid Gavin, APRN - NP   5 mg at 10/15/21 1059    sodium chloride flush 0.9 % injection 10 mL  10 mL IntraVENous 2 times per day Rashid Morita, APRN - NP   10 mL at 10/15/21 1100    sodium chloride flush 0.9 % injection 10 mL  10 mL IntraVENous PRN Rashid Morita, APRN - NP        0.9 % sodium chloride infusion  25 mL IntraVENous PRN Rashid Morita, APRN - NP        ondansetron (ZOFRAN-ODT) disintegrating tablet 4 mg  4 mg Oral Q8H PRN Rashid Morita, APRN - NP   4 mg at 10/14/21 2324    Or    ondansetron (ZOFRAN) injection 4 mg  4 mg IntraVENous Q6H PRN Rashid Morita, APRN - NP        acetaminophen (TYLENOL) tablet 650 mg  650 mg Oral Q6H PRN Rashid Morita, APRN - NP        Or    acetaminophen (TYLENOL) suppository 650 mg  650 mg Rectal Q6H PRN Rashid Morita, APRN - NP        magnesium hydroxide (MILK OF MAGNESIA) 400 MG/5ML suspension 30 mL  30 mL Oral Daily PRN Rashid Morita, APRN - NP        aspirin chewable tablet 81 mg  81 mg Oral Daily Rashid Morita, APRN - NP   81 mg at 10/15/21 1058    enoxaparin (LOVENOX) injection 40 mg  40 mg SubCUTAneous Daily Rashid Alonso, APRN - NP   40 mg at 10/15/21 1059    atorvastatin (LIPITOR) tablet 40 mg  40 mg Oral Nightly Lorrain Mins, APRN - NP   40 mg at 10/14/21 2324    nitroglycerin (NITRO-BID) 2 % ointment 1 inch  1 inch Topical Q6H PRN Lorrain Mins, APRN - NP           Past Medical History:    Past Medical History:   Diagnosis Date    [facility]     Cancer ([facility] 09/2017    right breast cancer status post lumpectomy    Edema     Fatty liver     elevated LFT's    Gout     HIGH CHOLESTEROL     HTN (hypertension)     Hypothyroidism     IFG (impaired fasting glucose)     Osteoarthritis     Palpitations     Pyelonephritis 03       Surgical History:    Past Surgical History:   Procedure Laterality Date    APPENDECTOMY  1954    BREAST SURGERY      CATARACT REMOVAL WITH IMPLANT Left [id number]    CHOLECYSTECTOMY  12/1970    EPIDURAL STEROID INJECTION Right 11/12/2019    RIGHT LUMBAR FIVE SACRAL ONE EPIDURAL STEROID INJECTION SITE CONFIRMED BY FLUOROSCOPY performed by Mily Escobedo MD at [facility] Right 2007    cataract     HYSTERECTOMY  5/1971    JOINT REPLACEMENT Left 2001    hip    JOINT REPLACEMENT Right 2002    hip    OTHER SURGICAL HISTORY Right 09/27/2017    LUMPECTOMY, NEEDLE LOCALIZATION AND SENTINEL NODERIGHT LUMPECTOMY, NEEDLE LOCALIZATION AND SENTINEL NODE    THYROIDECTOMY  1968    TONSILLECTOMY  1945             Pre-Sedation:  Pre-Sedation Documentation and Exam:  I have personally completed a history, physical exam & review of systems for this patient (see notes). Prior History of Anesthesia Complications:   none    Modified Mallampati:  III (soft palate, base of uvula visible)    ASA Classification:  Class 3 - A patient with severe systemic disease that limits activity but is not incapacitating    Jonathan Scale:   Activity:  2 - Able to move 4 extremities voluntarily on command  Respiration:  2 - Able to breathe deeply and cough freely  Circulation:  2 - BP+/- 20mmHg of normal  Consciousness:  2 - Fully awake  Oxygen Saturation (color):  2 - Able to maintain oxygen saturation >92% on room air    Sedation/Anesthesia Plan:  Guard the patient's safety and welfare. Minimize physical discomfort and pain. Minimize negative psychological responses to treatment by providing sedation and analgesia and maximize the potential amnesia. Patient to meet pre-procedure discharge plan.     Medication Planned:  midazolam intravenously and fentanyl intravenously    Patient is an appropriate candidate for plan of sedation:   yes      Electronically signed by Alison Xavier MD on 10/15/2021 at 3:21 PM

## 2021-10-15 NOTE — CARE COORDINATION
CASE MANAGEMENT INITIAL ASSESSMENT      Reviewed chart and completed assessment with:patient  Explained Case Management role/services. Primary contact information:see below    Health Care Decision Maker :   Primary Decision Maker (Active): Jacque Muñoz - [phone]    Secondary Decision Maker: Kami Castellanos - Johnny - [phone]          Can this person be reached and be able to respond quickly, such as within a few minutes or hours? Yes  Admit date/status:10/14/2021  Diagnosis:chest pain   Is this a Readmission?:  No      Insurance:Medicare   Precert required for SNF: No       3 night stay required: waived    Living arrangements, Adls, care needs, prior to admission:Pt lives alone in a 2 story home, [place], but states she has been getting weaker lately. Pt can drive, but states that she isn't driving now and that family assists her. Transportation:private     Durable Medical Equipment at home:  Walker_x2_Cane_4 prong cane_RTS_x_ BSC__Shower Chair_x_  02__ HHN__ CPAP__  BiPap__  Hospital Bed__ W/Cx___ Other_grab bars_________    Services in the home and/or outpatient, prior to [address] (if applicable)   · Name:  · Address:  · Dialysis Schedule:  · Phone:  · Fax:    PT/OT recs:ordered    Hospital Exemption Notification (HEN):not initiated    Barriers to discharge:none    Plan/comments:Pt plans to d/c back home, would like [address]; Pt states she has been getting weaker and would like assistance at home, if possible. PT/OT eval in place. CM made referral to [facility] [place] and [place] [facility]. Pt also given private duty list for assistance at home.        ECOC on chart for MD signature

## 2021-10-15 NOTE — CONSULTS
Consult placed    Who:Siena  Date:10/15/2021,  Time:8:25 AM        Electronically signed by Karrie Miranda on 10/15/2021 at 8:25 AM

## 2021-10-15 NOTE — PROCEDURES
CARDIAC CATHETERIZATION REPORT     Procedure Date:  10/15/2021  Patient Name: Meme Hollis  MRN: [id number] : 1940      INDICATION     Non-STEMI    PROCEDURES PERFORMED     Left heart catheterization  Coronary angiogam  Coronary cath  Monitoring of moderate conscious sedation          PROCEDURE DESCRIPTION   Risks/benefits/alternatives/outcomes were discussed with patient and/or family and informed consent was obtained. Using the [facility] scale, the patient's right radial artery was found to be a level B. Patient was prepped draped in the usual sterile fashion. Local anaesthetic was applied over puncture site. Using a back wall technique, a 6 Mong[place] Terumo sheath was inserted into right radial artery. Verapamil, nitroglycerin, nicardipine were administered through the sheath. Heparin was administered. Diagnostic 5 Icelandic ultra catheters were used for diagnostic angiograms. It was used for left heart catheterization, LV gram was deferred due to azotemia. At the conclusion of the procedure, a TR band was placed over the puncture site and hemostasis was obtained. There were no immediate complications. I supervised sedation from 3:26 PM to be 345 PM with versed 1 mg/fentanyl 50 mcg during the procedure. An independent trained observer pushed meds at my direction. We monitored the patient's level of consciousness and vital signs/physiologic status throughout the procedure duration (see times listed previously). 60 cc contrast was utilized. <20cc EBL. FINDINGS       Left heart catheterization    LVEDP  9   GRADIENT ACROSS AORTIC VALVE  none         CORONARY ARTERIES    LM Less than 10% [id number] stenosis         LAD  Moderately calcified, 20% proximal/mid-distal stenosis. D1 is a medium to large size vessel with ostial 40% stenosis. LCX  Ostial proximal 20 to 30% stenosis, mid-distal less than 10% stenosis.        RI  Could also be described as D1, see above for D1 details. RCA Mildly calcified, dominant, [id number] 20% stenosis. CONCLUSIONS:     Mild CAD/ASHD, continue to treat medically with aspirin, statin, beta-blocker  No further inpatient cardiac work-up or treatment is planned, will sign off, please call with questions.

## 2021-10-15 NOTE — CONSULTS
[address] [place] [address]  [phone]      Attending Physician: Arben Storey MD  Reason for Consultation/Chief Complaint: Chest pain, abdominal pain    Subjective   History of Present Illness:  Parminder Dove is a 78 y.o. patient who presented to the hospital with complaints of abdominal pain and chest pain since September 2021. She says she ultimately also had increasing back pain, received an injection in her back for that and ultimately by first week of October, she began to feel worse predominantly with abdominal pain. She says she was taking Tums with improvement in her symptoms all point is progressed to a point where she developed shortness of breath, weakness, heaviness in both of her arms as well as chest pain. As such, she presented to emergency room yesterday. She was admitted to the hospital due to concerns for unstable angina. Serial troponin level were found to be negative. She feels about the same today. In the course of work-up, her calcium level was found to be elevated at 13.6. She underwent a CT scan of the chest/abdomen which was negative for acute abnormality. Patient denies any prior cardiac history. Denies prior cardiac evaluation and denies diabetes (although she has been noted to have impaired fasting glucose). Chronically she has had hypertension, hypercholesterolemia, and has had a history of hypothyroidism as well as breast cancer. These conditions have been stable and prescribe edible therapy, she is been on chronic hormone therapy with anastrozole. In the course of work-up, patient was also noted to have azotemia with BUN in the 50s. Creatinine was normal.  She has been empirically treated with antibiotics.     Past Medical History:   has a past medical history of Breast cancer ([facility] [address] Cancer ([facility] [address] Edema, Fatty liver, Gout, HIGH CHOLESTEROL, HTN (hypertension), Hypothyroidism, IFG (impaired fasting glucose), Osteoarthritis, Palpitations, and Pyelonephritis. Surgical History:   has a past surgical history that includes Thyroidectomy (1968); Tonsillectomy (1945); Appendectomy (1954); Cholecystectomy (12/1970); Hysterectomy (5/1971); eye surgery (Right, 2007); joint replacement (Left, 2001); joint replacement (Right, 2002); Cataract removal with implant (Left, [id number]); other surgical history (Right, 09/27/2017); epidural steroid injection (Right, 11/12/2019); and Breast surgery. Social History:   reports that she has never smoked. She has never used smokeless tobacco. She reports current alcohol use of about 5.0 - 6.0 standard drinks of alcohol per week. She reports that she does not use drugs. Family History:  family history includes Alcohol Abuse in her sister; Cancer in her brother and mother; Other in her mother. Home Medications:  Were reviewed and are listed in nursing record and/or below  Prior to Admission medications    Medication Sig Start Date End Date Taking? Authorizing Provider   traMADol (ULTRAM) 50 MG tablet Take 50 mg by mouth 3 times daily.  9/27/21  Yes Historical Provider, MD   levothyroxine (SYNTHROID) 125 MCG tablet TAKE 1 TABLET BY MOUTH DAILY FOR THYROID 10/13/21  Yes LONG Conde CNP   cloNIDine (CATAPRES) 0.1 MG tablet TAKE 1/2 TABLET BY MOUTH DAILY FOR HIGH BLOOD PRESSURE 10/13/21  Yes Michael Pitts MD   atorvastatin (LIPITOR) 40 MG tablet TAKE 1/2 TABLET BY MOUTH DAILY FOR HIGH CHOLESTEROL 9/7/21  Yes Michael Pitts MD   irbesartan (AVAPRO) 300 MG tablet TAKE 1 TABLET BY MOUTH DAILY FOR HIGH BLOOD PRESSURE 9/2/21  Yes Michael Pitts MD   metoprolol succinate (TOPROL XL) 50 MG extended release tablet TAKE 1 TABLET BY MOUTH EVERY DAY FOR HIGH BLOOD PRESSURE 5/13/21  Yes Michael Pitts MD   meloxicam (MOBIC) 7.5 MG tablet TAKE 1 TABLET BY MOUTH DAILY AS NEEDED FOR ARTHRITIS 5/13/21  Yes Michael Pitts MD   oxybutynin (DITROPAN) 5 MG tablet TAKE 1 TABLET BY MOUTH TWICE DAILY FOR BLADDER 12/13/20  Yes Michael Pitts MD Multiple Vitamins-Minerals (THERAPEUTIC MULTIVITAMIN-MINERALS) tablet Take 1 tablet by mouth daily   Yes Historical Provider, MD   anastrozole (ARIMIDEX) 1 MG tablet TK 1 T PO QD 2/9/18  Yes Historical Provider, MD   Acetaminophen (TYLENOL 8 HOUR PO) Take 625 mg by mouth 3 times daily    Yes Historical Provider, MD        CURRENT Medications:  perflutren lipid microspheres (DEFINITY) injection 1.65 mg, ONCE PRN  aspirin chewable tablet 324 mg, Daily  anastrozole (ARIMIDEX) tablet 1 mg, Daily  losartan (COZAAR) tablet 100 mg, Daily  levothyroxine (SYNTHROID) tablet 125 mcg, Daily  metoprolol succinate (TOPROL XL) extended release tablet 50 mg, Daily  therapeutic multivitamin-minerals 1 tablet, Daily  oxybutynin (DITROPAN) tablet 5 mg, BID  sodium chloride flush 0.9 % injection 10 mL, 2 times per day  sodium chloride flush 0.9 % injection 10 mL, PRN  0.9 % sodium chloride infusion, PRN  ondansetron (ZOFRAN-ODT) disintegrating tablet 4 mg, Q8H PRN   Or  ondansetron (ZOFRAN) injection 4 mg, Q6H PRN  acetaminophen (TYLENOL) tablet 650 mg, Q6H PRN   Or  acetaminophen (TYLENOL) suppository 650 mg, Q6H PRN  magnesium hydroxide (MILK OF MAGNESIA) 400 MG/5ML suspension 30 mL, Daily PRN  aspirin chewable tablet 81 mg, Daily  enoxaparin (LOVENOX) injection 40 mg, Daily  atorvastatin (LIPITOR) tablet 40 mg, Nightly  nitroglycerin (NITRO-BID) 2 % ointment 1 inch, Q6H PRN        Allergies:  Lisinopril and Vasotec     Review of Systems:   A 14 point review of symptoms completed. Pertinent positives identified in the HPI, all other review of symptoms negative as below.       Objective   PHYSICAL EXAM:    Vitals:    10/15/21 0818   BP: 106/65   Pulse: 84   Resp: 15   Temp: 97.1 °F (36.2 °C)   SpO2: 97%    Weight: 160 lb 1.6 oz (72.6 kg)         General Appearance:  Alert, cooperative, no distress, appears stated age, sitting in chair, able to speak in full sentences, does appear to be fatigued   Head:  Normocephalic, without obvious abnormality, atraumatic   Eyes:  PERRL, conjunctiva/corneas clear   Nose: Nares normal, no drainage or sinus tenderness   Throat: Lips, mucosa, and tongue normal   Neck: Supple, symmetrical, trachea midline, no adenopathy, thyroid: not enlarged, symmetric, no tenderness/mass/nodules, no carotid bruit or JVD   Lungs:   Clear to auscultation bilaterally, respirations unlabored   Chest Wall:  No deformity or tenderness   Heart:  Regular rate and rhythm, S1, S2 normal, no murmur, rub or gallop   Abdomen:   Soft, non-tender, bowel sounds active all four quadrants,  no masses, no organomegaly   Extremities: Extremities normal, atraumatic, no cyanosis or edema   Pulses: 2+ and symmetric   Skin: Skin color, texture, turgor normal, no rashes or lesions   Pysch: Normal mood and affect   Neurologic: Normal gross motor and sensory exam.         Labs   CBC:   Lab Results   Component Value Date    WBC 8.6 10/15/2021    RBC 3.70 10/15/2021    HGB 14.2 10/15/2021    HCT 40.6 10/15/2021    .7 10/15/2021    RDW 13.7 10/15/2021     10/15/2021     CMP:  Lab Results   Component Value Date     10/15/2021    K 4.4 10/15/2021     10/15/2021    CO2 19 10/15/2021    BUN 51 10/15/2021    CREATININE 0.9 10/15/2021    GFRAA >60 10/15/2021    GFRAA >60 11/08/2012    AGRATIO 1.5 10/14/2021    LABGLOM 60 10/15/2021    GLUCOSE 88 10/15/2021    PROT 7.0 10/14/2021    PROT 7.3 11/08/2012    CALCIUM 11.7 10/15/2021    BILITOT 0.8 10/14/2021    ALKPHOS 107 10/14/2021    AST 42 10/14/2021    ALT 45 10/14/2021     PT/INR:  No results found for: PTINR  HgBA1c:  Lab Results   Component Value Date    LABA1C 4.9 04/09/2021     Lab Results   Component Value Date    TROPONINI 0.01 10/15/2021         Cardiac Data     Last EKG: Normal sinus rhythm, nonspecific ST changes    Echo:    Stress Test:    Cath:    Studies:       cxr    Impression   No acute cardiopulmonary disease.             I have reviewed labs and imaging/xray/diagnostic Office  10/15/2021 9:41 AM

## 2021-10-15 NOTE — PROGRESS NOTES
Stress Lab contacted this RN in regards to needing a cardiology consult for patient 2/2 elevated troponin. Sercure message sent to Dr. Cantor Fraction, \"Pt admitted overnight w/ SOB/CP and elevated troponins. Stress lab called and states that a Cardiac consult needs to be placed for the elevated troponins, please. Thank you\" Will continue to monitor.

## 2021-10-15 NOTE — H&P
Hospital Medicine History & Physical      PCP: Elbert Obregon MD    Date of Admission: 10/14/2021    Date of Service: Pt seen/examined on 10/14/2021 and Admitted to Inpatient with expected LOS greater than two midnights due to medical therapy. Chief Complaint:    Chief Complaint   Patient presents with    Shortness of Breath     started feeling bad october 4th, sob , chest pain,      History Of Present Illness:      78 y.o. female, with PMH of HTN, HLD, hypothyroidism, and breast CA, who presented to [facility] with shortness of breath, chest pain, abdominal pain, and generalized weakness. History was obtained from the patient and review of the EMR. The patient states that all of her symptoms started on October 4. She noted that she was having chest pain that radiated down her body and just generally felt unwell. She states that she has had several episodes of nausea though she has not thrown up. She denies any bloody stools, fever, or chills. The patient describes her chest pain as substernal, worsens with exertion, and is also associated with shortness of breath. She denies any cardiac history, no family history of cardiac disease. While in the ED, the patient was also noted to have systolic blood pressure in the 90s and almost passed out. She was noted to have mild elevation of troponin 0.02 on admission. The patient also complains of diffuse abdominal pain, though mainly located in lower quadrants. She states that she has been taking Tums once a day (only 5 total times) for the past couple days and this does seem to help with her abdominal pain. She denies any history of PUD or GI bleed. Denies any bloody stools or hematemesis. She does note taking meloxicam for many years for her arthritis. She will be admitted for further evaluation.     Past Medical History:          Diagnosis Date    Breast cancer ([facility] [address]     Cancer ([facility] [address] 09/2017    right breast cancer status post lumpectomy    Edema     Fatty liver     elevated LFT's    Gout     HIGH CHOLESTEROL     HTN (hypertension)     Hypothyroidism     IFG (impaired fasting glucose)     Osteoarthritis     Palpitations     Pyelonephritis 03       Past Surgical History:          Procedure Laterality Date    APPENDECTOMY  1954    BREAST SURGERY      CATARACT REMOVAL WITH IMPLANT Left [id number]    CHOLECYSTECTOMY  12/1970    EPIDURAL STEROID INJECTION Right 11/12/2019    RIGHT LUMBAR FIVE SACRAL ONE EPIDURAL STEROID INJECTION SITE CONFIRMED BY FLUOROSCOPY performed by Mily Escobedo MD at [address] Right 2007    cataract     HYSTERECTOMY  5/1971    JOINT REPLACEMENT Left 2001    hip    JOINT REPLACEMENT Right 2002    hip    OTHER SURGICAL HISTORY Right 09/27/2017    LUMPECTOMY, NEEDLE LOCALIZATION AND SENTINEL NODERIGHT LUMPECTOMY, NEEDLE LOCALIZATION AND SENTINEL NODE    THYROIDECTOMY  1968    TONSILLECTOMY  1945       Medications Prior to Admission:      Prior to Admission medications    Medication Sig Start Date End Date Taking? Authorizing Provider   traMADol (ULTRAM) 50 MG tablet Take 50 mg by mouth 3 times daily.  9/27/21  Yes Historical Provider, MD   levothyroxine (SYNTHROID) 125 MCG tablet TAKE 1 TABLET BY MOUTH DAILY FOR THYROID 10/13/21  Yes LONG Swain - CNP   cloNIDine (CATAPRES) 0.1 MG tablet TAKE 1/2 TABLET BY MOUTH DAILY FOR HIGH BLOOD PRESSURE 10/13/21  Yes Chantal Pitts MD   atorvastatin (LIPITOR) 40 MG tablet TAKE 1/2 TABLET BY MOUTH DAILY FOR HIGH CHOLESTEROL 9/7/21  Yes Chantal Pitts MD   irbesartan (AVAPRO) 300 MG tablet TAKE 1 TABLET BY MOUTH DAILY FOR HIGH BLOOD PRESSURE 9/2/21  Yes Chantal Pitts MD   metoprolol succinate (TOPROL XL) 50 MG extended release tablet TAKE 1 TABLET BY MOUTH EVERY DAY FOR HIGH BLOOD PRESSURE 5/13/21  Yes Chantal Pitts MD   meloxicam (MOBIC) 7.5 MG tablet TAKE 1 TABLET BY MOUTH DAILY AS NEEDED FOR ARTHRITIS 5/13/21  Yes Chantal Pitts MD oxybutynin (DITROPAN) 5 MG tablet TAKE 1 TABLET BY MOUTH TWICE DAILY FOR BLADDER 12/13/20  Yes Saskia Pitts MD   Multiple Vitamins-Minerals (THERAPEUTIC MULTIVITAMIN-MINERALS) tablet Take 1 tablet by mouth daily   Yes Historical Provider, MD   anastrozole (ARIMIDEX) 1 MG tablet TK 1 T PO QD 2/9/18  Yes Historical Provider, MD   Acetaminophen (TYLENOL 8 HOUR PO) Take 625 mg by mouth 3 times daily    Yes Historical Provider, MD       Allergies:  Lisinopril and Vasotec    Social History:      The patient currently lives independently. TOBACCO:   reports that she has never smoked. She has never used smokeless tobacco.  ETOH:   reports current alcohol use of about 5.0 - 6.0 standard drinks of alcohol per week. Family History:      Reviewed in detail. Positive as follows:        Problem Relation Age of Onset   Diaz Cancer Mother         breast / liver CA    Other Mother         pernicious anemia    Cancer Brother         prostate cancer    Alcohol Abuse Sister        REVIEW OF SYSTEMS:   Pertinent positives as noted in the HPI. All other systems reviewed and negative. PHYSICAL EXAM PERFORMED:    /73   Pulse 77   Temp 98.1 °F (36.7 °C) (Oral)   Resp 16   Ht 5' 3\" (1.6 m)   Wt 160 lb 1.6 oz (72.6 kg)   SpO2 100%   BMI 28.36 kg/m²     General appearance: Pleasant, elderly female in no apparent distress, appears stated age and cooperative. HEENT:  Normal cephalic, atraumatic without obvious deformity. Pupils equal, round, and reactive to light. Extra ocular muscles intact. Conjunctivae/corneas clear. Neck: Supple, with full range of motion. No jugular venous distention. Trachea midline. Respiratory:  Normal respiratory effort. Clear to auscultation, bilaterally without Rales/Wheezes/Rhonchi. Cardiovascular:  Regular rate and rhythm with normal S1/S2 without murmurs, rubs or gallops. Abdomen: Soft, tender to lower quadrants, non-distended with normal bowel sounds.   Musculoskeletal:  No clubbing, cyanosis or edema bilaterally. Full range of motion without deformity. Skin: Skin color, texture, turgor normal.  No rashes or lesions. Neurologic:  Neurovascularly intact without any focal sensory/motor deficits. Cranial nerves: II-XII intact, grossly non-focal.  Psychiatric:  Alert and oriented, thought content appropriate, normal insight  Capillary Refill: Brisk,< 3 seconds   Peripheral Pulses: +2 palpable, equal bilaterally       Labs:     Recent Labs     10/14/21  1651   WBC 7.9   HGB 14.9   HCT 42.5        Recent Labs     10/14/21  1651      K 4.7      CO2 22   BUN 53*   CREATININE 1.1   CALCIUM 13.8*     Recent Labs     10/14/21  1651   AST 42*   ALT 45*   BILITOT 0.8   ALKPHOS 107     No results for input(s): INR in the last 72 hours. Recent Labs     10/14/21  1651 10/14/21  2300   TROPONINI 0.02* 0.02*       Urinalysis:      Lab Results   Component Value Date    NITRU POSITIVE 10/14/2021    WBCUA 10-20 10/14/2021    BACTERIA 3+ 10/14/2021    RBCUA 0-2 10/14/2021    BLOODU Negative 10/14/2021    SPECGRAV 1.020 10/14/2021    GLUCOSEU Negative 10/14/2021       Radiology:     CXR: I have reviewed the CXR with the following interpretation: No acute cardiopulmonary findings  EKG: Interpreted by ED physician:   normal sinus rhythm with a rate of 80  Axis is   Normal  QTc is  normal  Intervals and Durations are unremarkable. No specific ST-T wave changes appreciated. No evidence of acute ischemia. No significant change from prior EKG dated 12/15/2014.     Cardiac Monitoring: No ectopy. Normal rate. CT CHEST ABDOMEN PELVIS WO CONTRAST   Final Result   Chest CT:      No acute abnormality detected. 5 mm nodule within the periphery the right upper lobe. See recommendations   below. Abdomen and pelvis CT:      No acute abnormality detected.       Extensive diverticulosis of the large bowel, mainly in the region of the   sigmoid colon, but without CT evidence of diverticulitis. RECOMMENDATIONS:   Fleischner Society guidelines for follow-up and management of incidentally   detected pulmonary nodules:      Single Solid Nodule:      Nodule size less than 6 mm   In a low-risk patient, no routine follow-up. In a high-risk patient, optional CT at 12 months. - Low risk patients include individuals with minimal or absent history of   smoking and other known risk factors. - High risk patients include individuals with a history or smoking or known   risk factors. Radiology 2017 http://pubs. rsna.org/doi/full/10.1148/radiol. 3582770341         XR CHEST PORTABLE   Final Result   No acute cardiopulmonary disease. NM MYOCARDIAL SPECT REST EXERCISE OR RX    (Results Pending)       ASSESSMENT:    Active Hospital Problems    Diagnosis Date Noted    Chest pain [R07.9] 10/14/2021         PLAN:    Chest pain in setting of no known HX of CAD. - Serial troponin - initial negative  - EKG w/o ischemic changes  - FLP in am  - NPO after MN  - Stress test in am  - PRN nitro  - Tele monitoring  - Continue home lipitor, irbesartan, toprol xl, add asa  - Heart score 6    Hypercalcemia, 13.8 on admission, unclear etiology. Dehydration? Malignancy?  - Pending labs: PthRP, 1,25 dihydroxy vitamin D, ionized calcium  - Monitor with BMP  - Tele monitoring    Essential HTN, controlled. - Continue home irbesartan, metoprolol xl  - Telemetry monitoring    Hypothyroidism, clinically euthyroid. - Continue home levothyroxine  - Follow up with PCP for med adjustments    Hx of breast CA s/p lumpectomy. Stable. - Continue home arimidex        DVT Prophylaxis: lovenox  Diet: Diet NPO  Code Status: Full Code    PT/OT Eval Status: not yet ordered    Dispo - pending workup       Letty Gould - NP    Thank you Maria D Gallegos MD for the opportunity to be involved in this patient's care.  If you have any questions or concerns please feel free to contact me at [phone].  -------------------------Dr. Mckenzie Aldana--------------------------

## 2021-10-15 NOTE — PROGRESS NOTES
Patient returned to unit from cath lab. VSS. Right radial site free of redness, hematoma, swelling, or drainage. Radial compression device in place. Air to begin being removed at 33 [phone] 74 per Cath Lab RN. Primary RN's Blanche Nieto and Aamir Wood provided update. Will monitor patient closely. Bed in lowest locked position, call light within reach.

## 2021-10-16 LAB
ANION GAP SERPL CALCULATED.3IONS-SCNC: 11 MMOL/L (ref 3–16)
ANION GAP SERPL CALCULATED.3IONS-SCNC: 12 MMOL/L (ref 3–16)
BACTERIA: ABNORMAL /HPF
BILIRUBIN URINE: NEGATIVE
BLOOD, URINE: ABNORMAL
BUN BLDV-MCNC: 26 MG/DL (ref 7–20)
BUN BLDV-MCNC: 29 MG/DL (ref 7–20)
CALCIUM SERPL-MCNC: 10.1 MG/DL (ref 8.3–10.6)
CALCIUM SERPL-MCNC: 9.3 MG/DL (ref 8.3–10.6)
CHLORIDE BLD-SCNC: 107 MMOL/L (ref 99–110)
CHLORIDE BLD-SCNC: 114 MMOL/L (ref 99–110)
CLARITY: CLEAR
CO2: 21 MMOL/L (ref 21–32)
CO2: 24 MMOL/L (ref 21–32)
COLOR: YELLOW
CREAT SERPL-MCNC: 0.7 MG/DL (ref 0.6–1.2)
CREAT SERPL-MCNC: 0.8 MG/DL (ref 0.6–1.2)
EPITHELIAL CELLS, UA: ABNORMAL /HPF (ref 0–5)
GFR AFRICAN AMERICAN: >60
GFR AFRICAN AMERICAN: >60
GFR NON-AFRICAN AMERICAN: >60
GFR NON-AFRICAN AMERICAN: >60
GLUCOSE BLD-MCNC: 72 MG/DL (ref 70–99)
GLUCOSE BLD-MCNC: 75 MG/DL (ref 70–99)
GLUCOSE URINE: NEGATIVE MG/DL
HCT VFR BLD CALC: 34.8 % (ref 36–48)
HEMOGLOBIN: 12.3 G/DL (ref 12–16)
HYALINE CASTS: ABNORMAL /LPF (ref 0–2)
KETONES, URINE: NEGATIVE MG/DL
LEUKOCYTE ESTERASE, URINE: ABNORMAL
MCH RBC QN AUTO: 38.6 PG (ref 26–34)
MCHC RBC AUTO-ENTMCNC: 35.4 G/DL (ref 31–36)
MCV RBC AUTO: 108.9 FL (ref 80–100)
MICROSCOPIC EXAMINATION: YES
NITRITE, URINE: NEGATIVE
PDW BLD-RTO: 14 % (ref 12.4–15.4)
PH UA: 6 (ref 5–8)
PLATELET # BLD: 114 K/UL (ref 135–450)
PMV BLD AUTO: 7.3 FL (ref 5–10.5)
POTASSIUM SERPL-SCNC: 3.9 MMOL/L (ref 3.5–5.1)
POTASSIUM SERPL-SCNC: 4.1 MMOL/L (ref 3.5–5.1)
PROTEIN UA: NEGATIVE MG/DL
RBC # BLD: 3.2 M/UL (ref 4–5.2)
RBC UA: ABNORMAL /HPF (ref 0–4)
SODIUM BLD-SCNC: 142 MMOL/L (ref 136–145)
SODIUM BLD-SCNC: 147 MMOL/L (ref 136–145)
SPECIFIC GRAVITY UA: 1.02 (ref 1–1.03)
URINE REFLEX TO CULTURE: ABNORMAL
URINE TYPE: ABNORMAL
UROBILINOGEN, URINE: 0.2 E.U./DL
WBC # BLD: 6.5 K/UL (ref 4–11)
WBC UA: ABNORMAL /HPF (ref 0–5)

## 2021-10-16 PROCEDURE — 97162 PT EVAL MOD COMPLEX 30 MIN: CPT

## 2021-10-16 PROCEDURE — 1200000000 HC SEMI PRIVATE

## 2021-10-16 PROCEDURE — 6360000002 HC RX W HCPCS: Performed by: NURSE PRACTITIONER

## 2021-10-16 PROCEDURE — 97535 SELF CARE MNGMENT TRAINING: CPT

## 2021-10-16 PROCEDURE — 2580000003 HC RX 258: Performed by: INTERNAL MEDICINE

## 2021-10-16 PROCEDURE — 2580000003 HC RX 258: Performed by: NURSE PRACTITIONER

## 2021-10-16 PROCEDURE — 97116 GAIT TRAINING THERAPY: CPT

## 2021-10-16 PROCEDURE — 97166 OT EVAL MOD COMPLEX 45 MIN: CPT

## 2021-10-16 PROCEDURE — 81001 URINALYSIS AUTO W/SCOPE: CPT

## 2021-10-16 PROCEDURE — 36415 COLL VENOUS BLD VENIPUNCTURE: CPT

## 2021-10-16 PROCEDURE — 97110 THERAPEUTIC EXERCISES: CPT

## 2021-10-16 PROCEDURE — 80048 BASIC METABOLIC PNL TOTAL CA: CPT

## 2021-10-16 PROCEDURE — 97530 THERAPEUTIC ACTIVITIES: CPT

## 2021-10-16 PROCEDURE — 85027 COMPLETE CBC AUTOMATED: CPT

## 2021-10-16 PROCEDURE — 6360000002 HC RX W HCPCS: Performed by: HOSPITALIST

## 2021-10-16 PROCEDURE — 6370000000 HC RX 637 (ALT 250 FOR IP): Performed by: NURSE PRACTITIONER

## 2021-10-16 PROCEDURE — 2580000003 HC RX 258: Performed by: HOSPITALIST

## 2021-10-16 RX ADMIN — SODIUM CHLORIDE, PRESERVATIVE FREE 10 ML: 5 INJECTION INTRAVENOUS at 21:20

## 2021-10-16 RX ADMIN — SODIUM CHLORIDE: 9 INJECTION, SOLUTION INTRAVENOUS at 06:58

## 2021-10-16 RX ADMIN — OXYBUTYNIN CHLORIDE 5 MG: 5 TABLET ORAL at 21:20

## 2021-10-16 RX ADMIN — METOPROLOL SUCCINATE 50 MG: 50 TABLET, EXTENDED RELEASE ORAL at 08:53

## 2021-10-16 RX ADMIN — LEVOTHYROXINE SODIUM 125 MCG: 0.12 TABLET ORAL at 05:51

## 2021-10-16 RX ADMIN — ENOXAPARIN SODIUM 40 MG: 40 INJECTION SUBCUTANEOUS at 08:54

## 2021-10-16 RX ADMIN — CEFTRIAXONE SODIUM 1000 MG: 1 INJECTION, POWDER, FOR SOLUTION INTRAMUSCULAR; INTRAVENOUS at 16:17

## 2021-10-16 RX ADMIN — Medication 1 TABLET: at 08:53

## 2021-10-16 RX ADMIN — SODIUM CHLORIDE, PRESERVATIVE FREE 10 ML: 5 INJECTION INTRAVENOUS at 08:54

## 2021-10-16 RX ADMIN — OXYBUTYNIN CHLORIDE 5 MG: 5 TABLET ORAL at 08:53

## 2021-10-16 RX ADMIN — ANASTROZOLE 1 MG: 1 TABLET ORAL at 08:53

## 2021-10-16 RX ADMIN — ATORVASTATIN CALCIUM 40 MG: 40 TABLET, FILM COATED ORAL at 21:20

## 2021-10-16 RX ADMIN — ASPIRIN 81 MG: 81 TABLET, CHEWABLE ORAL at 08:53

## 2021-10-16 ASSESSMENT — PAIN DESCRIPTION - PAIN TYPE
TYPE: ACUTE PAIN
TYPE: ACUTE PAIN

## 2021-10-16 ASSESSMENT — PAIN SCALES - GENERAL
PAINLEVEL_OUTOF10: 0
PAINLEVEL_OUTOF10: 5
PAINLEVEL_OUTOF10: 5
PAINLEVEL_OUTOF10: 0

## 2021-10-16 ASSESSMENT — PAIN DESCRIPTION - LOCATION
LOCATION: ABDOMEN
LOCATION: ABDOMEN

## 2021-10-16 ASSESSMENT — PAIN DESCRIPTION - DESCRIPTORS: DESCRIPTORS: ACHING

## 2021-10-16 NOTE — PLAN OF CARE
Problem: Falls - Risk of:  Goal: Will remain free from falls  Description: Will remain free from falls  10/16/2021 0314 by Beth Gary RN  Outcome: Ongoing  Note: Pt high fall risk. Instructed to use call light before getting out of bed. Call light within reach. Bed in low position. Bed alarm on. Will continue to monitor. Problem: Pain:  Goal: Control of acute pain  Description: Control of acute pain  Outcome: Ongoing  Note: Pt will be satisfied with pain control. Pt uses numeric pain rating scale with reassessments after pain med administration. Will continue to monitor progression throughout shift      VS remained stable during shift and pt called out as needed.

## 2021-10-16 NOTE — PLAN OF CARE
Problem: Falls - Risk of:  Goal: Will remain free from falls  Description: Will remain free from falls  10/16/2021 1240 by Bhaskar Martinez RN  Outcome: Ongoing     Problem: Falls - Risk of:  Goal: Absence of physical injury  Description: Absence of physical injury  Outcome: Ongoing     Problem: Pain:  Goal: Pain level will decrease  Description: Pain level will decrease  Outcome: Ongoing     Problem: Pain:  Goal: Control of acute pain  Description: Control of acute pain  10/16/2021 1240 by Bhaskar Martinez RN  Outcome: Ongoing

## 2021-10-16 NOTE — PROGRESS NOTES
clear.  Neck: Supple, with full range of motion. No jugular venous distention. Trachea midline. Respiratory:  Normal respiratory effort. Clear to auscultation, bilaterally without Rales/Wheezes/Rhonchi. Cardiovascular: Regular rate and rhythm with normal S1/S2 without murmurs, rubs or gallops. Abdomen: Soft, non-tender, non-distended with normal bowel sounds. Musculoskeletal: No clubbing, cyanosis or edema bilaterally. Full range of motion without deformity. Bilateral lower extremity bruising. Skin: Skin color, texture, turgor normal.  No rashes or lesions. Neurologic:  Neurovascularly intact without any focal sensory/motor deficits. Cranial nerves: II-XII intact, grossly non-focal.  Psychiatric: Alert and oriented, thought content appropriate, normal insight  Capillary Refill: Brisk,3 seconds, normal   Peripheral Pulses: +2 palpable, equal bilaterally       Labs:   Recent Labs     10/14/21  1651 10/15/21  0728 10/16/21  0600   WBC 7.9 8.6 6.5   HGB 14.9 14.2 12.3   HCT 42.5 40.6 34.8*    140 114*     Recent Labs     10/15/21  0728 10/15/21  1329 10/16/21  0600    140 147*   K 4.4 4.5 3.9    105 114*   CO2 19* 21 21   BUN 51* 44* 29*   CREATININE 0.9 0.9 0.7   CALCIUM 11.7* 11.2* 9.3     Recent Labs     10/14/21  1651   AST 42*   ALT 45*   BILITOT 0.8   ALKPHOS 107     No results for input(s): INR in the last 72 hours. Recent Labs     10/14/21  1651 10/14/21  2300 10/15/21  0728   TROPONINI 0.02* 0.02* 0.01       Urinalysis:      Lab Results   Component Value Date    NITRU POSITIVE 10/14/2021    WBCUA 10-20 10/14/2021    BACTERIA 3+ 10/14/2021    RBCUA 0-2 10/14/2021    BLOODU Negative 10/14/2021    SPECGRAV 1.020 10/14/2021    GLUCOSEU Negative 10/14/2021       Radiology:  CT CHEST ABDOMEN PELVIS WO CONTRAST   Final Result   Chest CT:      No acute abnormality detected. 5 mm nodule within the periphery the right upper lobe. See recommendations   below.       Abdomen and pelvis CT: No acute abnormality detected. Extensive diverticulosis of the large bowel, mainly in the region of the   sigmoid colon, but without CT evidence of diverticulitis. RECOMMENDATIONS:   Fleischner Society guidelines for follow-up and management of incidentally   detected pulmonary nodules:      Single Solid Nodule:      Nodule size less than 6 mm   In a low-risk patient, no routine follow-up. In a high-risk patient, optional CT at 12 months. - Low risk patients include individuals with minimal or absent history of   smoking and other known risk factors. - High risk patients include individuals with a history or smoking or known   risk factors. Radiology 2017 http://pubs. rsna.org/doi/full/10.1148/radiol. 8701299843         XR CHEST PORTABLE   Final Result   No acute cardiopulmonary disease. Assessment/Plan:    Active Hospital Problems    Diagnosis     NSTEMI (non-ST elevated myocardial infarction) ([facility] [address] [I21.4]     Chest pain [R07.9]      1. This 78-year-old female admitted with a dyspnea on exertion slightly elevated troponin I cardiology consulted continue with aspirin, beta-blocker, statin. 2D echo on 10/15/2021   Normal left ventricle size, wall thickness, and systolic function with a   visually estimated ejection fraction of 55-60%. No regional wall motion abnormalities are seen. Normal left ventricular diastolic filling pressure. Normal right ventricular size and function. No significant valvular disease within the limits of the study. Systolic pulmonary artery pressure (SPAP) is normal and estimated at 26 mmHg   (right atrial pressure 3 mmHg). S/P cardiac cath on 10/15/2021 mild coronary artery disease/ASHD recommended continue to treat medically with aspirin, beta blocker, statin no further inpatient cardiac work-up or treatment . 2.   Hypercalcemia on admission improving with hydration patient with a history of breast cancer questionable recurrent

## 2021-10-16 NOTE — CARE COORDINATION
CM spoke with patient and daughter in regards to SNF recommendation. They would like a referral to [facility] [place]. Referral made via epic and message left for Chastity, as patient may be ready for discharge today.

## 2021-10-16 NOTE — PROGRESS NOTES
Occupational Therapy   Occupational Therapy Initial Assessment/Treatment Note  Date: 10/16/2021   Patient Name: Gali Olivarez  MRN: [id number]     : 1940    Date of Service: 10/16/2021    Discharge Recommendations:  [address]  OT Equipment Recommendations  Equipment Needed: No     Barriers to home discharge:   [x] Steps to access home entry or bed/bath:   [] No rail with steps to access home entry or bed/bath   [x] Reported available assist at home upon discharge limited   [x] Patient or family requests DC to other than home    [] Patient reports expectation of post acute Discharge disposition to other than home   [] Other:      Assessment   Performance deficits / Impairments: Decreased functional mobility ; Decreased ADL status; Decreased endurance;Decreased high-level IADLs;Decreased strength  Assessment: After evaluation and diagnosis, pt was admitted to [facility] secondary to dyspnea, and was noted to have NSTEMI s/p cadiac cath 10/15 pt found to be presenting with the above mentioned occupational performance deficits which are affecting participation in daily living skills. Pt reports PLOF as independent with all I/ADLs, functional mobilty, and transfers. At baseline pt uses a reacher and sockaid for LB dressing. Pt is functioning below baseline needing grossly Konrad for functional transfers, Konrad for LB dressing, and needing a RW for functional mobility to/from bathroom and around room. Pt would benefit from continued skilled occupational therapy to address ADLs, functional mobility, and safety while in acute care. Prognosis: Good  Decision Making: Medium Complexity  OT Education: OT Role;Plan of Care;ADL Adaptive Strategies; Equipment; Family Education  REQUIRES OT FOLLOW UP: Yes  Activity Tolerance  Activity Tolerance: Patient Tolerated treatment well  Safety Devices  Safety Devices in place: Yes  Type of devices: Gait belt;Patient at risk for falls;Call light within reach; Chair alarm in place; Left in chair  Restraints  Initially in place: No           Patient Diagnosis(es): The primary encounter diagnosis was Chest pain, unspecified type. Diagnoses of Abdominal pain, unspecified abdominal location, Shortness of breath, and Fatigue, unspecified type were also pertinent to this visit. has a past medical history of Breast cancer ([facility] [address] Cancer ([facility] [address] Edema, Fatty liver, Gout, HIGH CHOLESTEROL, HTN (hypertension), Hypothyroidism, IFG (impaired fasting glucose), Osteoarthritis, Palpitations, and Pyelonephritis. has a past surgical history that includes Thyroidectomy (1968); Tonsillectomy (1945); Appendectomy (1954); Cholecystectomy (12/1970); Hysterectomy (5/1971); eye surgery (Right, 2007); joint replacement (Left, 2001); joint replacement (Right, 2002); Cataract removal with implant (Left, [id number]); other surgical history (Right, 09/27/2017); epidural steroid injection (Right, 11/12/2019); and Breast surgery.            Restrictions  Restrictions/Precautions  Restrictions/Precautions: General Precautions, Fall Risk, Cardiac  Position Activity Restriction  Other position/activity restrictions: Telemetry, S/P cardiac cath R radial 10/15 (no wrist flexion or WB R wrist x 24 hrs), up with assist    Subjective   General  Chart Reviewed: Yes  Patient assessed for rehabilitation services?: Yes  Family / Caregiver Present: Yes (daughter)  Referring Practitioner: Jeremiah Beckford MD  Diagnosis: CAD, NSTEMI s/p LHC  Subjective  Subjective: Pt pleasant and agreeable to OT evaluation  General Comment  Comments: RN approved josué  Patient Currently in Pain: Denies  Vital Signs  Patient Currently in Pain: Denies  Social/Functional History  Social/Functional History  Lives With: Alone  Type of Home: House  Home Layout: Two level, 1/2 bath on main level  Home Access: Stairs to enter with rails  Entrance Stairs - Number of Steps: 1 MEERA through garage, 12 steps to bed/bath on R and part way up on L  Entrance Stairs - Rails: Left  Bathroom Shower/Tub: Walk-in shower, Shower chair with back  Bathroom Toilet: Standard  Bathroom Equipment: Grab bars in shower, Built-in shower seat  Bathroom Accessibility: Walker accessible  Home Equipment: Rolling walker, Standard walker, Quad cane, Cane (transport chair)  ADL Assistance: [address]: Independent  Homemaking Responsibilities: Yes  Meal Prep Responsibility: Primary  Laundry Responsibility: Primary  Cleaning Responsibility: Primary  Shopping Responsibility: No  Ambulation Assistance: Independent (Reports typically I without AD, has been using RW in past week)  Transfer Assistance: Independent  Active : Yes (hasn't been driving in past two weeks)  Occupation: Retired  Type of occupation:   Leisure & Hobbies: reading, swimming, gardening  Additional Comments: Pt reports she could stay on first floor for a couple days if needed, pt reports decline in mobility over last 1-2 weeks with with increased back and abdmoinal pain, has hx of LBP and spinal stenosis, pt denies hx of falls in past 6 months.        Objective   Vision: Impaired  Vision Exceptions: Wears glasses at all times  Hearing: Within functional limits    Orientation  Overall Orientation Status: Within Functional Limits  Observation/Palpation  Posture: Fair  Observation: Immobilizer to R wrist  Balance  Sitting Balance: Supervision  Standing Balance: Contact guard assistance (no AD)  Standing Balance  Time: ~30 seconds, ~1 minute  Activity: functional mobility and in stance at sink  Comment: CGA with RW  Functional Mobility  Functional - Mobility Device: Rolling Walker  Activity: To/from bathroom  Assist Level: Contact guard assistance  Toilet Transfers  Toilet - Technique: Ambulating  Equipment Used: Standard toilet  Toilet Transfer: Minimal assistance (unable to use R wrist)  ADL  Grooming: Contact guard assistance (in stance at sink)  Toileting: Stand by assistance  Tone RUE  RUE Tone: Normotonic  Tone LUE  LUE Tone: Normotonic  Coordination  Movements Are Fluid And Coordinated: Yes     Bed mobility  Supine to Sit: Unable to assess  Sit to Supine: Unable to assess  Scooting: Unable to assess  Comment: pt seated in bedside chair at start and end of session  Transfers  Sit to stand: Contact guard assistance  Stand to sit: Contact guard assistance  Transfer Comments: with RW     Cognition  Overall Cognitive Status: WFL        Sensation  Overall Sensation Status: Impaired (Pt reports baseline N/T to B feet)        LUE AROM (degrees)  LUE AROM : WFL  Left Hand AROM (degrees)  Left Hand AROM: WFL  RUE AROM (degrees)  RUE AROM : WFL  RUE General AROM: Did not test RUE  Right Hand AROM (degrees)  Right Hand AROM: WFL  LUE Strength  Gross LUE Strength: WFL  RUE Strength  Gross RUE Strength: WFL    Plan   Plan  Times per week: 3-5x a week  Times per day: Daily  Current Treatment Recommendations: Strengthening, Self-Care / ADL, Functional Mobility Training, Endurance Training    AM-PAC Score        [facility] Inpatient Daily Activity Raw Score: 21 (10/16/21 1511)  AM-PAC Inpatient ADL T-Scale Score : 44.27 (10/16/21 1511)  ADL Inpatient CMS 0-100% Score: 32.79 (10/16/21 1511)  ADL Inpatient CMS G-Code Modifier : Ottie Gain (10/16/21 1511)    Goals  Short term goals  Time Frame for Short term goals: 1 week unless otherwise specified (10/23/21)  Short term goal 1: Pt will complete x15 reps of BUE AROM exercise to increase strength for performance in I/ADLs (10/19/21)  Short term goal 2: Pt will complete toilet transfer with SBA  Short term goal 3: Pt will complete LB dressing with SPV and AD PRN  Short term goal 4: Pt will complete standing ADL at sink for 5 minutes with SPV  Patient Goals   Patient goals : \"get back home\"       Therapy Time   Individual Concurrent Group Co-treatment   Time In 1327         Time Out 1400         Minutes 33         Timed Code Treatment Minutes: 23 Minutes (10 minutes for eval)       Tracy Almaraz OTR/L    If pt is unable to be seen after this session, please let this note serve as discharge summary. Please see case management note for discharge disposition. Thank you.

## 2021-10-16 NOTE — PROGRESS NOTES
Physical Therapy    Facility/Department: [facility] A2 CARD TELEMETRY  Initial Assessment/Treatment    NAME: Sea Drummond  : 1940  MRN: [id number]    Date of Service: 10/16/2021    Discharge Recommendations:  Subacute/Skilled Nursing Facility   PT Equipment Recommendations  Equipment Needed: No  Other: Pt has RW and transport chair    Assessment   Body structures, Functions, Activity limitations: Decreased functional mobility ; Increased pain;Decreased balance;Decreased strength;Decreased endurance  Assessment: Pt is an 78 y.o. female admitted to [facility] secondary to dyspnea, noted to have NSTEMI s/p cadiac cath 10/15. Pt lives alone in two story home with one MEERA. Pt reprots she is typically I with functional mobility and gait without AD. Pt does report recent use of RW in last week d/t increasing pain. Pt is currently functioning below her baseline requiring modA for bed mobility, CGA for t/f and CGA to Konrad for gait with RW and without AD. Pt is limited by decreased activity tolerance, balance and strength as well as limited use of RUE d/t recent cardiac cath. Pt will benefit from continued skilled PT in acute care setting to address above deficits. D/t pts current deficits, high PLOF, no 24 hr assist at d/c as well as MEERA and full flight to bed/bath recommend SNF at d/c. Pt reports considering getting further assistance from A as family works and cannot provide 24 hr assistance. Treatment Diagnosis: Impaired functional mobility and gait  Specific instructions for Next Treatment: Progress mobility as tolerated  Prognosis: Good  Decision Making: Medium Complexity  PT Education: Goals; General Safety;Gait Training;PT Role;Disease Specific Education;Plan of Care; Functional Mobility Training;Precautions;Transfer Training;Weight-bearing Education  Patient Education: Reviewed px following cadiac cath, recommendations 24hr S vs SNF. Importance of OOB mobility and gait, safety with functional mobility and use of AD.  Pt verbalized understanding  Barriers to Learning: None  REQUIRES PT FOLLOW UP: Yes  Activity Tolerance  Activity Tolerance: Patient Tolerated treatment well;Patient limited by fatigue;Patient limited by pain; Patient limited by endurance  Activity Tolerance: /65, HR 78, SpO2 98% on RA       Patient Diagnosis(es): The primary encounter diagnosis was Chest pain, unspecified type. Diagnoses of Abdominal pain, unspecified abdominal location, Shortness of breath, and Fatigue, unspecified type were also pertinent to this visit. has a past medical history of Breast cancer ([facility] [address] Cancer ([facility] [address] Edema, Fatty liver, Gout, HIGH CHOLESTEROL, HTN (hypertension), Hypothyroidism, IFG (impaired fasting glucose), Osteoarthritis, Palpitations, and Pyelonephritis. has a past surgical history that includes Thyroidectomy (1968); Tonsillectomy (1945); Appendectomy (1954); Cholecystectomy (12/1970); Hysterectomy (5/1971); eye surgery (Right, 2007); joint replacement (Left, 2001); joint replacement (Right, 2002); Cataract removal with implant (Left, [id number]); other surgical history (Right, 09/27/2017); epidural steroid injection (Right, 11/12/2019); and Breast surgery.     Restrictions  Restrictions/Precautions  Restrictions/Precautions: General Precautions, Fall Risk, Cardiac  Position Activity Restriction  Other position/activity restrictions: Telemetry, S/P cardiac cath R radial 10/15 (no wrist flexion or WB R wrist x 24 hrs), up with assist  Vision/Hearing  Vision: Impaired  Vision Exceptions: Wears glasses at all times  Hearing: Within functional limits     Subjective  General  Chart Reviewed: Yes  Patient assessed for rehabilitation services?: Yes  Additional Pertinent Hx: Per Dr. Vega Akers H&P \"78year-old female with a past medical history of hypertension, hyperlipidemia, hypothyroidism, breast cancer admitted with a dyspnea\"  Response To Previous Treatment: Not applicable  Family / Caregiver Present: No  Referring Practitioner: Vielka Willoughby MD  Referral Date : 10/16/21  Diagnosis: CAD, NSTEMI s/p LHC  Follows Commands: Within Functional Limits  General Comment  Comments: RN cleared pt for session  Subjective  Subjective: Pt supine in bed on approach, pleasant and agreeable to PT evaluation  Pain Screening  Patient Currently in Pain: Yes  Pain Assessment  Pain Assessment: 0-10  Pain Level: 5  Pain Type: Acute pain  Pain Location: Abdomen  Pain Descriptors: Aching  Non-Pharmaceutical Pain Intervention(s): Ambulation/Increased Activity;Repositioned; Therapeutic presence;Distraction  Vital Signs  Patient Currently in Pain: Yes  Pre Treatment Pain Screening  Intervention List: Patient able to continue with treatment    Orientation  Orientation  Overall Orientation Status: Within Normal Limits  Social/Functional History  Social/Functional History  Lives With: Alone  Type of Home: House  Home Layout: Two level, 1/2 bath on main level  Home Access: Stairs to enter with rails  Entrance Stairs - Number of Steps: 1 MEERA through garage, 12 steps to bed/bath on R and part way up on L  Entrance Stairs - Rails: Left  Bathroom Shower/Tub: Walk-in shower, Shower chair with back  Bathroom Toilet: Standard  Bathroom Equipment: Grab bars in shower, Built-in shower seat  Bathroom Accessibility: Walker accessible  Home Equipment: Rolling walker, Standard walker, Quad cane, Cane (Transport chair)  ADL Assistance: Independent  Homemaking Assistance: Independent  Homemaking Responsibilities: Yes  Meal Prep Responsibility: Primary  Laundry Responsibility: Primary  Cleaning Responsibility: Primary  Shopping Responsibility: No  Ambulation Assistance: Independent (Reports typically I without AD, has been using RW in past week)  Transfer Assistance: Independent  Active : Yes (hasn't been driving in past two weeks)  Additional Comments: Pt reports she could stay on first floor for a couple days if needed, pt reports decline in mobility over last 1-2 weeks with with increased back and abdmoinal pain, has hx of LBP and spinal stenosis, pt denies hx of falls in past 6 months. Objective     Observation/Palpation  Posture: Fair  Observation: Immobilizer to R wrist    AROM RLE (degrees)  RLE AROM: WFL  AROM LLE (degrees)  LLE AROM : WFL     Strength RLE  Comment: Grossly 4-/5  Strength LLE  Comment: Grossly 4-/5     Tone RLE  RLE Tone: Normotonic  Tone LLE  LLE Tone: Normotonic  Motor Control  Gross Motor?: WFL     Sensation  Overall Sensation Status: Impaired (Pt reports baseline N/T to B feet)     Bed mobility  Supine to Sit: Moderate assistance (modA at trunk, HOB elevated, use of BR, cues to not use RUE)  Sit to Supine: Unable to assess (Pt seated in recliner at end of session)     Transfers  Sit to Stand: Contact guard assistance  Stand to sit: Contact guard assistance  Comment: EOB and commode to RW, cues to maintain RUE NWB     Ambulation  Ambulation?: Yes  More Ambulation?: Yes  Ambulation 1  Surface: level tile  Device: Rolling Walker  Assistance: Contact guard assistance  Quality of Gait: Partial step through pattern, B decreased toe clearance, B decreased heel strike, B decreased hip extension, B decreased hip/knee flexion in stance, forward flexed trunk, increased B lateral sway, slight forward flexed trunk. Pt mildly unsteady no overt LOB. Gait Deviations: Slow Mily; Increased HARINI; Decreased step length;Decreased step height;Shuffles  Distance: 13' + 10'  Comments: Pt with RUE resting on RW for balance (within px d/t R radaial cardiac cath px), limited by fatigue  Ambulation 2  Surface - 2: level tile  Device 2: No device (LUE HHA)  Assistance 2: Minimal assistance  Quality of Gait 2: Partial step through pattern, B decreased toe clearance, B decreased heel strike, B decreased hip extension, B decreased hip/knee flexion in stance, forward flexed trunk, increased B lateral sway, slight forward flexed trunk.  Pt mildly unsteady no overt increase I with functional mobility and gait  Patient Goals   Patient goals : \"Get stronger\"       Therapy Time   Individual Concurrent Group Co-treatment   Time In 0804         Time Out 0846         Minutes 42         Timed Code Treatment Minutes: 32 Minutes (10 minutes for eval)       If pt is unable to be seen after this session, please let this note serve as discharge summary. Please see case management note for discharge disposition. Thank you.     Paris Galvez, PT, DPT

## 2021-10-17 VITALS
DIASTOLIC BLOOD PRESSURE: 70 MMHG | TEMPERATURE: 97.5 F | HEIGHT: 63 IN | RESPIRATION RATE: 18 BRPM | HEART RATE: 76 BPM | SYSTOLIC BLOOD PRESSURE: 113 MMHG | BODY MASS INDEX: 28.37 KG/M2 | OXYGEN SATURATION: 98 % | WEIGHT: 160.1 LBS

## 2021-10-17 PROCEDURE — 6370000000 HC RX 637 (ALT 250 FOR IP): Performed by: NURSE PRACTITIONER

## 2021-10-17 PROCEDURE — 6360000002 HC RX W HCPCS: Performed by: HOSPITALIST

## 2021-10-17 PROCEDURE — 2580000003 HC RX 258: Performed by: HOSPITALIST

## 2021-10-17 PROCEDURE — 6370000000 HC RX 637 (ALT 250 FOR IP): Performed by: HOSPITALIST

## 2021-10-17 PROCEDURE — 2580000003 HC RX 258: Performed by: NURSE PRACTITIONER

## 2021-10-17 RX ORDER — ASPIRIN 81 MG/1
81 TABLET, CHEWABLE ORAL DAILY
Qty: 30 TABLET | Refills: 3 | Status: ON HOLD | OUTPATIENT
Start: 2021-10-18 | End: 2021-11-05 | Stop reason: HOSPADM

## 2021-10-17 RX ORDER — DOCUSATE SODIUM 100 MG/1
100 CAPSULE, LIQUID FILLED ORAL 2 TIMES DAILY
Status: DISCONTINUED | OUTPATIENT
Start: 2021-10-17 | End: 2021-10-17 | Stop reason: HOSPADM

## 2021-10-17 RX ADMIN — Medication 1 TABLET: at 08:59

## 2021-10-17 RX ADMIN — ACETAMINOPHEN 650 MG: 325 TABLET ORAL at 06:07

## 2021-10-17 RX ADMIN — METOPROLOL SUCCINATE 50 MG: 50 TABLET, EXTENDED RELEASE ORAL at 08:59

## 2021-10-17 RX ADMIN — DOCUSATE SODIUM 100 MG: 100 CAPSULE ORAL at 08:59

## 2021-10-17 RX ADMIN — BISACODYL 10 MG: 5 TABLET, COATED ORAL at 08:59

## 2021-10-17 RX ADMIN — OXYBUTYNIN CHLORIDE 5 MG: 5 TABLET ORAL at 08:59

## 2021-10-17 RX ADMIN — MAGNESIUM HYDROXIDE 30 ML: 2400 SUSPENSION ORAL at 14:25

## 2021-10-17 RX ADMIN — CEFTRIAXONE SODIUM 1000 MG: 1 INJECTION, POWDER, FOR SOLUTION INTRAMUSCULAR; INTRAVENOUS at 14:26

## 2021-10-17 RX ADMIN — ASPIRIN 81 MG: 81 TABLET, CHEWABLE ORAL at 08:59

## 2021-10-17 RX ADMIN — LEVOTHYROXINE SODIUM 125 MCG: 0.12 TABLET ORAL at 05:53

## 2021-10-17 RX ADMIN — SODIUM CHLORIDE, PRESERVATIVE FREE 10 ML: 5 INJECTION INTRAVENOUS at 09:00

## 2021-10-17 RX ADMIN — ANASTROZOLE 1 MG: 1 TABLET ORAL at 08:59

## 2021-10-17 ASSESSMENT — PAIN SCALES - GENERAL
PAINLEVEL_OUTOF10: 0
PAINLEVEL_OUTOF10: 4
PAINLEVEL_OUTOF10: 4

## 2021-10-17 NOTE — PROGRESS NOTES
Hospitalist Progress Note      PCP: Ruthann Murillo MD    Date of Admission: 10/14/2021    Chief Complaint: Shortness of breath    Hospital Course: This 78-year-old female with a past medical history of hypertension, hyperlipidemia, hypothyroidism, breast cancer admitted with a dyspnea since 10/4/2021, in the emergency room patient was noted to have elevated troponin I, low blood pressure. Cardiology consulted status post cardiac cath on 10/15/2021, mild coronary artery disease/ASHD. Recommended medical management. Waiting for placement. Subjective: Patient denies any chest pain or shortness of breath complains of abdominal discomfort no BM for 1 week. Her  passed away 2 years ago she was living alone in a two-story house now agreeable to go to nursing home.       Medications:  Reviewed    Infusion Medications    sodium chloride       Scheduled Medications    cefTRIAXone (ROCEPHIN) IV  1,000 mg IntraVENous Q24H    anastrozole  1 mg Oral Daily    [Held by provider] losartan  100 mg Oral Daily    levothyroxine  125 mcg Oral Daily    metoprolol succinate  50 mg Oral Daily    therapeutic multivitamin-minerals  1 tablet Oral Daily    oxybutynin  5 mg Oral BID    sodium chloride flush  10 mL IntraVENous 2 times per day    aspirin  81 mg Oral Daily    enoxaparin  40 mg SubCUTAneous Daily    atorvastatin  40 mg Oral Nightly     PRN Meds: sodium chloride, acetaminophen, sodium chloride flush, ondansetron **OR** ondansetron, acetaminophen **OR** acetaminophen, magnesium hydroxide, nitroglycerin      Intake/Output Summary (Last 24 hours) at 10/17/2021 0709  Last data filed at 10/17/2021 0553  Gross per 24 hour   Intake 777.89 ml   Output 1250 ml   Net -472.11 ml       Physical Exam Performed:    BP (!) 142/73   Pulse 79   Temp 97.5 °F (36.4 °C) (Oral)   Resp 16   Ht 5' 3\" (1.6 m)   Wt 160 lb 1.6 oz (72.6 kg)   SpO2 98%   BMI 28.36 kg/m²     General appearance: No apparent distress, appears stated age and cooperative. HEENT: Pupils equal, round, and reactive to light. Conjunctivae/corneas clear. Neck: Supple, with full range of motion. No jugular venous distention. Trachea midline. Respiratory:  Normal respiratory effort. Clear to auscultation, bilaterally without Rales/Wheezes/Rhonchi. Cardiovascular: Regular rate and rhythm with normal S1/S2 without murmurs, rubs or gallops. Abdomen: Soft, non-tender, non-distended with normal bowel sounds. Musculoskeletal: No clubbing, cyanosis or edema bilaterally. Full range of motion without deformity. Skin: Skin color, texture, turgor normal.  No rashes or lesions. Neurologic:  Neurovascularly intact without any focal sensory/motor deficits. Cranial nerves: II-XII intact, grossly non-focal.  Psychiatric: Alert and oriented, thought content appropriate, normal insight  Capillary Refill: Brisk,3 seconds, normal   Peripheral Pulses: +2 palpable, equal bilaterally       Labs:   Recent Labs     10/14/21  1651 10/15/21  0728 10/16/21  0600   WBC 7.9 8.6 6.5   HGB 14.9 14.2 12.3   HCT 42.5 40.6 34.8*    140 114*     Recent Labs     10/15/21  1329 10/16/21  0600 10/16/21  1206    147* 142   K 4.5 3.9 4.1    114* 107   CO2 21 21 24   BUN 44* 29* 26*   CREATININE 0.9 0.7 0.8   CALCIUM 11.2* 9.3 10.1     Recent Labs     10/14/21  1651   AST 42*   ALT 45*   BILITOT 0.8   ALKPHOS 107     No results for input(s): INR in the last 72 hours. Recent Labs     10/14/21  1651 10/14/21  2300 10/15/21  0728   TROPONINI 0.02* 0.02* 0.01       Urinalysis:      Lab Results   Component Value Date    NITRU Negative 10/16/2021    WBCUA 6-9 10/16/2021    BACTERIA Rare 10/16/2021    RBCUA 5-10 10/16/2021    BLOODU SMALL 10/16/2021    SPECGRAV 1.020 10/16/2021    GLUCOSEU Negative 10/16/2021       Radiology:  CT CHEST ABDOMEN PELVIS WO CONTRAST   Final Result   Chest CT:      No acute abnormality detected. 5 mm nodule within the periphery the right upper lobe. See recommendations   below. Abdomen and pelvis CT:      No acute abnormality detected. Extensive diverticulosis of the large bowel, mainly in the region of the   sigmoid colon, but without CT evidence of diverticulitis. RECOMMENDATIONS:   Fleischner Society guidelines for follow-up and management of incidentally   detected pulmonary nodules:      Single Solid Nodule:      Nodule size less than 6 mm   In a low-risk patient, no routine follow-up. In a high-risk patient, optional CT at 12 months. - Low risk patients include individuals with minimal or absent history of   smoking and other known risk factors. - High risk patients include individuals with a history or smoking or known   risk factors. Radiology 2017 http://pubs. rsna.org/doi/full/10.1148/radiol. 4076266044         XR CHEST PORTABLE   Final Result   No acute cardiopulmonary disease. Assessment/Plan:    Active Hospital Problems    Diagnosis     NSTEMI (non-ST elevated myocardial infarction) ([facility] [address] [I21.4]     Chest pain [R07.9]      3  This 73-year-old female admitted with a dyspnea on exertion slightly elevated troponin I cardiology consulted continue with aspirin, beta-blocker, statin.  2D echo on 10/15/2021   Normal left ventricle size, wall thickness, and systolic function with a   visually estimated ejection fraction of 55-60%.   No regional wall motion abnormalities are seen.   Normal left ventricular diastolic filling pressure.   Normal right ventricular size and function.   No significant valvular disease within the limits of the study.   Systolic pulmonary artery pressure (SPAP) is normal and estimated at 26 mmHg   (right atrial pressure 3 mmHg).   S/P cardiac cath on 10/15/2021 mild coronary artery disease/ASHD recommended continue to treat medically with aspirin, beta blocker, statin no further inpatient cardiac work-up or treatment .     2.  Hypercalcemia on admission improving with hydration patient with history of breast cancer questionable recurrent malignancy monitor closely. PTH, vitamin D pending. 3.  Hypertension controlled continue with beta-blocker losartan on hold blood pressure controlled. 4.  Hypothyroidism continue with the Synthroid  5.  History of breast cancer status post lumpectomy 9/2017 stable continue with Arimidex. 6.  Debility patient was seen evaluated by physical Occupational Therapy recommended SNF patient initially refused nursing home placement now agreeable waiting for placement. 7.  UTI positive nitrite on 10/14/2021 no culture sent ,started on IV Rocephin on 10/16/21, repeat UA on 10/16/2021 -  8. Constipation is started on a stool softener and Dulcolax x1 today. DVT Prophylaxis: Lovenox subcu  Diet: ADULT DIET;  Regular  Code Status: Full Code    PT/OT Eval Status: Consulted recommending nursing home placement    Dispo -waiting for placement    Freddy Krishnamurthy MD

## 2021-10-17 NOTE — DISCHARGE INSTR - COC
Continuity of Care Form    Patient Name: Christina Andres   :  1940  MRN:  [id number]    Admit date:  10/14/2021  Discharge date:  10/17/2021    Code Status Order: Full Code   Advance Directives:      Admitting Physician:  Ruslan Hallman MD  PCP: Cuauhtemoc Recio MD    Discharging Nurse: [facility] Unit/Room#: 0206/0206-01  Discharging Unit Phone Number: [phone]    Emergency Contact:   Extended Emergency Contact Information  Primary Emergency Contact: Selene Vivas [address] Phone: [phone]  Relation: Child  Secondary Emergency Contact: [address] [place], [place] [address] Phone: [phone]  Mobile Phone: [phone]  Relation: Child  Preferred language: English   needed?  No    Past Surgical History:  Past Surgical History:   Procedure Laterality Date    APPENDECTOMY      BREAST SURGERY      CATARACT REMOVAL WITH IMPLANT Left [id number]    CHOLECYSTECTOMY  1970    EPIDURAL STEROID INJECTION Right 2019    RIGHT LUMBAR FIVE SACRAL ONE EPIDURAL STEROID INJECTION SITE CONFIRMED BY FLUOROSCOPY performed by Markus De Leon MD at [address] [place] [address] Right     cataract     HYSTERECTOMY  1971    JOINT REPLACEMENT Left     hip    JOINT REPLACEMENT Right     hip    OTHER SURGICAL HISTORY Right 2017    LUMPECTOMY, NEEDLE LOCALIZATION AND SENTINEL NODERIGHT LUMPECTOMY, NEEDLE LOCALIZATION AND SENTINEL NODE    THYROIDECTOMY  1968    TONSILLECTOMY  194       Immunization History:   Immunization History   Administered Date(s) Administered    COVID-19, Moderna, PF, 100mcg/0.5mL 2021, 2021    Influenza 2012    Influenza Virus Vaccine 10/07/2005, 10/16/2006, 2007, 12/15/2008, 2009, 10/05/2010, 2011, 2014    Influenza, High Dose (Fluzone 65 yrs and older) 12/15/2014, 10/13/2016, 10/24/2017, 2019    Influenza, Quadv, adjuvanted, 65 yrs +, IM, PF (Fluad) 10/02/2020, 10/14/2021  Influenza, Triv, inactivated, subunit, adjuvanted, IM (Fluad 65 yrs and older) 12/11/2019    Pneumococcal Conjugate 13-valent ([id number]) 04/11/2016    Pneumococcal Conjugate 7-valent (Prevnar7) 11/01/2002    Pneumococcal Polysaccharide ([id number]) 01/13/2014    Td, unspecified formulation 11/01/1993       Active Problems:  Patient Active Problem List   Diagnosis Code    Hypothyroidism E03.9    HTN (hypertension) I10    Gout M10.9    Arthritis M19.90    Palpitations R00.2    Edema R60.9    Elevated LFTs R79.89    IFG (impaired fasting glucose) R73.01    Fatty liver K76.0    Pyelonephritis N12    Localized osteoarthrosis, lower leg M17.10    DDD (degenerative disc disease), lumbar M51.36    Elevated lipids E78.5    Cataract H26.9    Sacroiliitis (HCC) M46.1    Hip pain M25.559    Peripheral edema R60.9    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    Spinal stenosis of lumbar region M48.061    Thrombocytopenia (HCC) D69.6    Chest pain R07.9    NSTEMI (non-ST elevated myocardial infarction) ([facility]) I21.4       Isolation/Infection:   Isolation            No Isolation          Patient Infection Status       Infection Onset Added Last Indicated Last Indicated By Review Planned Expiration Resolved Resolved By    None active    Resolved    COVID-19 Rule Out 10/14/21 10/14/21 10/14/21 COVID-19, Rapid (Ordered)   10/14/21 Rule-Out Test Resulted            Nurse Assessment:  Last Vital Signs: /70   Pulse 76   Temp 97.5 °F (36.4 °C) (Axillary)   Resp 18   Ht 5' 3\" (1.6 m)   Wt 160 lb 1.6 oz (72.6 kg)   SpO2 98%   BMI 28.36 kg/m²     Last documented pain score (0-10 scale): Pain Level: 4  Last Weight:   Wt Readings from Last 1 Encounters:   10/14/21 160 lb 1.6 oz (72.6 kg)     Mental Status:  oriented and alert    IV Access:  - None    Nursing Mobility/ADLs:  Walking   Assisted  Transfer  Assisted  Bathing  Assisted  Dressing  Assisted  Toileting  Assisted  Feeding Independent  Med Admin  Independent  Med Delivery   whole    Wound Care Documentation and Therapy:  Wound 10/14/21 Leg Right;Distal;Medial Bruising/Kelvin - sensitivity when touched. (Active)   Wound Etiology Venous 10/16/21 1913   Dressing/Treatment Open to air 10/16/21 1913   Wound Assessment Purple/maroon 10/16/21 1913   Drainage Amount None 10/16/21 1913   Odor None 10/16/21 1913   Mela-wound Assessment Intact 10/16/21 1913   Number of days: 2       Wound 10/14/21 Leg Left;Lateral Bruising/Kelvin - sensitivity when touched. (Active)   Wound Etiology Venous 10/16/21 1913   Dressing/Treatment Open to air 10/16/21 1913   Wound Assessment Purple/maroon 10/16/21 1913   Drainage Amount None 10/16/21 1913   Odor None 10/16/21 1913   Mela-wound Assessment Intact 10/16/21 1913   Number of days: 2        Elimination:  Continence:   · Bowel: Yes  · Bladder: Yes  Urinary Catheter: None   Colostomy/Ileostomy/Ileal Conduit: No       Date of Last BM: ***    Intake/Output Summary (Last 24 hours) at 10/17/2021 1402  Last data filed at 10/17/2021 1259  Gross per 24 hour   Intake 657.89 ml   Output 1250 ml   Net -592.11 ml     I/O last 3 completed shifts: In: 777.9 [P.O.:660; I.V.:67.9; IV Piggyback:50]  Out: 1250 [Urine:1250]    Safety Concerns: At Risk for Falls    Impairments/Disabilities:      Hearing    Nutrition Therapy:  Current Nutrition Therapy:   - Oral Diet:  General    Routes of Feeding: Oral  Liquids: No Restrictions  Daily Fluid Restriction: no  Last Modified Barium Swallow with Video (Video Swallowing Test): not done    Treatments at the Time of Hospital Discharge:   Respiratory Treatments:   Oxygen Therapy:  is not on home oxygen therapy.   Ventilator:    - No ventilator support    Rehab Therapies: Physical Therapy and Occupational Therapy  Weight Bearing Status/Restrictions: see right arm restrictions   Other Medical Equipment (for information only, NOT a DME order):  wheelchair and walker  Other Treatments: Patient's personal belongings (please select all that are sent with patient):  Glasses    RN SIGNATURE:  Electronically signed by Aminah Rajput RN on 10/17/21 at 3:28 PM EDT    CASE MANAGEMENT/SOCIAL WORK SECTION    Inpatient Status Date: ***    Readmission Risk Assessment Score:  Readmission Risk              Risk of Unplanned Readmission:  9           Discharging to Facility/ Agency   · Name: [facility]  · Address:  · Phone:[phone]  · IYE:[phone]    Dialysis Facility (if applicable)   · Name:  · Address:  · Dialysis Schedule:  · Phone:  · Fax:    / signature: Electronically signed by Sebastián Monk RN on 10/17/21 at 2:03 PM EDT    PHYSICIAN SECTION    Prognosis: Good    Condition at Discharge: Stable    Rehab Potential (if transferring to Rehab): Good    Recommended Labs or Other Treatments After Discharge: follow up with pcp in one week, BP check    Physician Certification: I certify the above information and transfer of Ravi Otero  is necessary for the continuing treatment of the diagnosis listed and that she requires [place] for less 30 days.      Update Admission H&P: No change in H&P    PHYSICIAN SIGNATURE:  Electronically signed by Gladis Vieira MD on 10/17/21 at 2:22 PM EDT

## 2021-10-17 NOTE — PLAN OF CARE
Problem: Falls - Risk of:  Goal: Will remain free from falls  Description: Will remain free from falls  10/16/2021 2234 by Lidia Barnhart RN  Outcome: Ongoing  Pt high fall risk. Instructed to use call light before getting out of bed. Call light within reach. Bed in low position. Bed alarm on. Will continue to monitor. Problem: Pain:  Goal: Control of acute pain  Description: Control of acute pain  10/16/2021 2234 by Lidia Barnhart RN  Outcome: Ongoing  Pt will be satisfied with pain control. Pt uses numeric pain rating scale with reassessments after pain med administration. Will continue to monitor progression throughout shift.

## 2021-10-17 NOTE — CARE COORDINATION
CM called and left message with Chaity, from [facility] [place]. Awaiting return call to see if they are able to accept. ADDENDUM 1120: CM updated patient on status. Still waiting on return call from [facility] [place]. ADDENDUM 1126: CM called and left message again with [facility] [place]. ADDENDUM 1350: CM received call from [facility] stating they are able to accept patient. MD notified.

## 2021-10-17 NOTE — PROGRESS NOTES
Attempted to call [facility] [place] for report. No answer. Name and call back number given.

## 2021-10-18 ENCOUNTER — APPOINTMENT (OUTPATIENT)
Dept: CT IMAGING | Age: 81
DRG: 312 | End: 2021-10-18
Payer: MEDICARE

## 2021-10-18 ENCOUNTER — APPOINTMENT (OUTPATIENT)
Dept: GENERAL RADIOLOGY | Age: 81
DRG: 312 | End: 2021-10-18
Payer: MEDICARE

## 2021-10-18 ENCOUNTER — HOSPITAL ENCOUNTER (INPATIENT)
Age: 81
LOS: 2 days | Discharge: NURSING, REHAB OR LONG-TERM CARE | DRG: 312 | End: 2021-10-20
Attending: EMERGENCY MEDICINE | Admitting: INTERNAL MEDICINE
Payer: MEDICARE

## 2021-10-18 DIAGNOSIS — R00.0 TACHYCARDIA: ICD-10-CM

## 2021-10-18 DIAGNOSIS — R42 DIZZINESS: Primary | ICD-10-CM

## 2021-10-18 DIAGNOSIS — N30.00 ACUTE CYSTITIS WITHOUT HEMATURIA: ICD-10-CM

## 2021-10-18 DIAGNOSIS — R74.01 TRANSAMINITIS: ICD-10-CM

## 2021-10-18 DIAGNOSIS — I95.1 ORTHOSTATIC HYPOTENSION: ICD-10-CM

## 2021-10-18 DIAGNOSIS — R91.8 PULMONARY NODULES: ICD-10-CM

## 2021-10-18 PROBLEM — N39.0 UTI (URINARY TRACT INFECTION): Status: ACTIVE | Noted: 2021-10-18

## 2021-10-18 LAB
A/G RATIO: 1.5 (ref 1.1–2.2)
ALBUMIN SERPL-MCNC: 4.1 G/DL (ref 3.4–5)
ALP BLD-CCNC: 110 U/L (ref 40–129)
ALT SERPL-CCNC: 72 U/L (ref 10–40)
ANION GAP SERPL CALCULATED.3IONS-SCNC: 11 MMOL/L (ref 3–16)
AST SERPL-CCNC: 84 U/L (ref 15–37)
BACTERIA: ABNORMAL /HPF
BASOPHILS ABSOLUTE: 0 K/UL (ref 0–0.2)
BASOPHILS RELATIVE PERCENT: 0.4 %
BILIRUB SERPL-MCNC: 0.6 MG/DL (ref 0–1)
BILIRUBIN URINE: ABNORMAL
BLOOD, URINE: ABNORMAL
BUN BLDV-MCNC: 21 MG/DL (ref 7–20)
CALCIUM SERPL-MCNC: 10.5 MG/DL (ref 8.3–10.6)
CHLORIDE BLD-SCNC: 99 MMOL/L (ref 99–110)
CLARITY: CLEAR
CO2: 26 MMOL/L (ref 21–32)
COLOR: YELLOW
CREAT SERPL-MCNC: 0.9 MG/DL (ref 0.6–1.2)
EKG ATRIAL RATE: 96 BPM
EKG DIAGNOSIS: NORMAL
EKG P AXIS: 0 DEGREES
EKG P-R INTERVAL: 142 MS
EKG Q-T INTERVAL: 344 MS
EKG QRS DURATION: 80 MS
EKG QTC CALCULATION (BAZETT): 434 MS
EKG R AXIS: 4 DEGREES
EKG T AXIS: 61 DEGREES
EKG VENTRICULAR RATE: 96 BPM
EOSINOPHILS ABSOLUTE: 0.1 K/UL (ref 0–0.6)
EOSINOPHILS RELATIVE PERCENT: 1.3 %
EPITHELIAL CELLS, UA: ABNORMAL /HPF (ref 0–5)
GFR AFRICAN AMERICAN: >60
GFR NON-AFRICAN AMERICAN: 60
GLOBULIN: 2.7 G/DL
GLUCOSE BLD-MCNC: 130 MG/DL (ref 70–99)
GLUCOSE URINE: NEGATIVE MG/DL
HCT VFR BLD CALC: 40.2 % (ref 36–48)
HEMOGLOBIN: 13.9 G/DL (ref 12–16)
HYALINE CASTS: ABNORMAL /LPF (ref 0–2)
KETONES, URINE: ABNORMAL MG/DL
LACTIC ACID, SEPSIS: 1.5 MMOL/L (ref 0.4–1.9)
LEUKOCYTE ESTERASE, URINE: ABNORMAL
LYMPHOCYTES ABSOLUTE: 1.5 K/UL (ref 1–5.1)
LYMPHOCYTES RELATIVE PERCENT: 13.8 %
MCH RBC QN AUTO: 38 PG (ref 26–34)
MCHC RBC AUTO-ENTMCNC: 34.7 G/DL (ref 31–36)
MCV RBC AUTO: 109.6 FL (ref 80–100)
MICROSCOPIC EXAMINATION: YES
MONOCYTES ABSOLUTE: 1.1 K/UL (ref 0–1.3)
MONOCYTES RELATIVE PERCENT: 10.1 %
NEUTROPHILS ABSOLUTE: 8.2 K/UL (ref 1.7–7.7)
NEUTROPHILS RELATIVE PERCENT: 74.4 %
NITRITE, URINE: NEGATIVE
PDW BLD-RTO: 14.7 % (ref 12.4–15.4)
PH UA: 5.5 (ref 5–8)
PLATELET # BLD: 139 K/UL (ref 135–450)
PMV BLD AUTO: 7.1 FL (ref 5–10.5)
POC ACT LR: 322 SEC
POTASSIUM REFLEX MAGNESIUM: 3.7 MMOL/L (ref 3.5–5.1)
PRO-BNP: 280 PG/ML (ref 0–449)
PROTEIN UA: ABNORMAL MG/DL
RBC # BLD: 3.67 M/UL (ref 4–5.2)
RBC UA: ABNORMAL /HPF (ref 0–4)
RENAL EPITHELIAL, UA: ABNORMAL /HPF (ref 0–1)
SODIUM BLD-SCNC: 136 MMOL/L (ref 136–145)
SPECIFIC GRAVITY UA: 1.02 (ref 1–1.03)
TOTAL PROTEIN: 6.8 G/DL (ref 6.4–8.2)
TROPONIN: 0.02 NG/ML
URINE TYPE: ABNORMAL
UROBILINOGEN, URINE: 0.2 E.U./DL
WBC # BLD: 11.1 K/UL (ref 4–11)
WBC UA: ABNORMAL /HPF (ref 0–5)

## 2021-10-18 PROCEDURE — 6370000000 HC RX 637 (ALT 250 FOR IP): Performed by: NURSE PRACTITIONER

## 2021-10-18 PROCEDURE — 83605 ASSAY OF LACTIC ACID: CPT

## 2021-10-18 PROCEDURE — 2580000003 HC RX 258: Performed by: NURSE PRACTITIONER

## 2021-10-18 PROCEDURE — 74018 RADEX ABDOMEN 1 VIEW: CPT

## 2021-10-18 PROCEDURE — 1200000000 HC SEMI PRIVATE

## 2021-10-18 PROCEDURE — 93005 ELECTROCARDIOGRAM TRACING: CPT | Performed by: NURSE PRACTITIONER

## 2021-10-18 PROCEDURE — 84484 ASSAY OF TROPONIN QUANT: CPT

## 2021-10-18 PROCEDURE — 36415 COLL VENOUS BLD VENIPUNCTURE: CPT

## 2021-10-18 PROCEDURE — 93010 ELECTROCARDIOGRAM REPORT: CPT | Performed by: INTERNAL MEDICINE

## 2021-10-18 PROCEDURE — 81001 URINALYSIS AUTO W/SCOPE: CPT

## 2021-10-18 PROCEDURE — 80074 ACUTE HEPATITIS PANEL: CPT

## 2021-10-18 PROCEDURE — 6360000002 HC RX W HCPCS: Performed by: NURSE PRACTITIONER

## 2021-10-18 PROCEDURE — 85025 COMPLETE CBC W/AUTO DIFF WBC: CPT

## 2021-10-18 PROCEDURE — 71045 X-RAY EXAM CHEST 1 VIEW: CPT

## 2021-10-18 PROCEDURE — 99285 EMERGENCY DEPT VISIT HI MDM: CPT

## 2021-10-18 PROCEDURE — 96361 HYDRATE IV INFUSION ADD-ON: CPT

## 2021-10-18 PROCEDURE — 96365 THER/PROPH/DIAG IV INF INIT: CPT

## 2021-10-18 PROCEDURE — 6360000004 HC RX CONTRAST MEDICATION: Performed by: NURSE PRACTITIONER

## 2021-10-18 PROCEDURE — 2580000003 HC RX 258: Performed by: INTERNAL MEDICINE

## 2021-10-18 PROCEDURE — 87086 URINE CULTURE/COLONY COUNT: CPT

## 2021-10-18 PROCEDURE — 80053 COMPREHEN METABOLIC PANEL: CPT

## 2021-10-18 PROCEDURE — 83880 ASSAY OF NATRIURETIC PEPTIDE: CPT

## 2021-10-18 PROCEDURE — 71260 CT THORAX DX C+: CPT

## 2021-10-18 RX ORDER — LEVOTHYROXINE SODIUM 0.12 MG/1
125 TABLET ORAL DAILY
Status: DISCONTINUED | OUTPATIENT
Start: 2021-10-19 | End: 2021-10-20 | Stop reason: HOSPADM

## 2021-10-18 RX ORDER — TRAMADOL HYDROCHLORIDE 50 MG/1
50 TABLET ORAL EVERY 6 HOURS PRN
Status: DISCONTINUED | OUTPATIENT
Start: 2021-10-18 | End: 2021-10-20 | Stop reason: HOSPADM

## 2021-10-18 RX ORDER — 0.9 % SODIUM CHLORIDE 0.9 %
1000 INTRAVENOUS SOLUTION INTRAVENOUS ONCE
Status: COMPLETED | OUTPATIENT
Start: 2021-10-18 | End: 2021-10-18

## 2021-10-18 RX ORDER — SODIUM CHLORIDE 0.9 % (FLUSH) 0.9 %
5-40 SYRINGE (ML) INJECTION PRN
Status: DISCONTINUED | OUTPATIENT
Start: 2021-10-18 | End: 2021-10-20 | Stop reason: HOSPADM

## 2021-10-18 RX ORDER — ACETAMINOPHEN 650 MG/1
650 SUPPOSITORY RECTAL EVERY 6 HOURS PRN
Status: DISCONTINUED | OUTPATIENT
Start: 2021-10-18 | End: 2021-10-20 | Stop reason: HOSPADM

## 2021-10-18 RX ORDER — POLYETHYLENE GLYCOL 3350 17 G/17G
17 POWDER, FOR SOLUTION ORAL DAILY PRN
Status: DISCONTINUED | OUTPATIENT
Start: 2021-10-18 | End: 2021-10-20 | Stop reason: HOSPADM

## 2021-10-18 RX ORDER — ACETAMINOPHEN 325 MG/1
650 TABLET ORAL EVERY 6 HOURS PRN
Status: DISCONTINUED | OUTPATIENT
Start: 2021-10-18 | End: 2021-10-20 | Stop reason: HOSPADM

## 2021-10-18 RX ORDER — LIDOCAINE 4 G/G
1 PATCH TOPICAL DAILY
Status: DISCONTINUED | OUTPATIENT
Start: 2021-10-19 | End: 2021-10-20 | Stop reason: HOSPADM

## 2021-10-18 RX ORDER — SODIUM CHLORIDE 9 MG/ML
INJECTION, SOLUTION INTRAVENOUS CONTINUOUS
Status: DISCONTINUED | OUTPATIENT
Start: 2021-10-18 | End: 2021-10-20

## 2021-10-18 RX ORDER — ASPIRIN 81 MG/1
81 TABLET, CHEWABLE ORAL DAILY
Status: DISCONTINUED | OUTPATIENT
Start: 2021-10-19 | End: 2021-10-20 | Stop reason: HOSPADM

## 2021-10-18 RX ORDER — SODIUM CHLORIDE 9 MG/ML
25 INJECTION, SOLUTION INTRAVENOUS PRN
Status: DISCONTINUED | OUTPATIENT
Start: 2021-10-18 | End: 2021-10-20 | Stop reason: HOSPADM

## 2021-10-18 RX ORDER — ATORVASTATIN CALCIUM 10 MG/1
20 TABLET, FILM COATED ORAL NIGHTLY
Status: DISCONTINUED | OUTPATIENT
Start: 2021-10-18 | End: 2021-10-20 | Stop reason: HOSPADM

## 2021-10-18 RX ORDER — SODIUM CHLORIDE 0.9 % (FLUSH) 0.9 %
5-40 SYRINGE (ML) INJECTION EVERY 12 HOURS SCHEDULED
Status: DISCONTINUED | OUTPATIENT
Start: 2021-10-18 | End: 2021-10-20 | Stop reason: HOSPADM

## 2021-10-18 RX ORDER — METOPROLOL SUCCINATE 50 MG/1
50 TABLET, EXTENDED RELEASE ORAL DAILY
Status: DISCONTINUED | OUTPATIENT
Start: 2021-10-19 | End: 2021-10-20 | Stop reason: HOSPADM

## 2021-10-18 RX ORDER — CLONIDINE HYDROCHLORIDE 0.1 MG/1
TABLET ORAL
COMMUNITY
Start: 2021-01-21 | End: 2022-11-03

## 2021-10-18 RX ORDER — ONDANSETRON 4 MG/1
4 TABLET, ORALLY DISINTEGRATING ORAL EVERY 8 HOURS PRN
Status: DISCONTINUED | OUTPATIENT
Start: 2021-10-18 | End: 2021-10-20 | Stop reason: HOSPADM

## 2021-10-18 RX ORDER — ONDANSETRON 2 MG/ML
4 INJECTION INTRAMUSCULAR; INTRAVENOUS EVERY 6 HOURS PRN
Status: DISCONTINUED | OUTPATIENT
Start: 2021-10-18 | End: 2021-10-20 | Stop reason: HOSPADM

## 2021-10-18 RX ADMIN — SODIUM CHLORIDE 1000 ML: 9 INJECTION, SOLUTION INTRAVENOUS at 13:12

## 2021-10-18 RX ADMIN — ATORVASTATIN CALCIUM 20 MG: 10 TABLET, FILM COATED ORAL at 22:45

## 2021-10-18 RX ADMIN — IOPAMIDOL 75 ML: 755 INJECTION, SOLUTION INTRAVENOUS at 19:03

## 2021-10-18 RX ADMIN — TRAMADOL HYDROCHLORIDE 50 MG: 50 TABLET, FILM COATED ORAL at 22:44

## 2021-10-18 RX ADMIN — CEFTRIAXONE SODIUM 1000 MG: 1 INJECTION, POWDER, FOR SOLUTION INTRAMUSCULAR; INTRAVENOUS at 14:57

## 2021-10-18 RX ADMIN — SODIUM CHLORIDE: 9 INJECTION, SOLUTION INTRAVENOUS at 22:57

## 2021-10-18 RX ADMIN — SODIUM CHLORIDE 1000 ML: 9 INJECTION, SOLUTION INTRAVENOUS at 14:54

## 2021-10-18 RX ADMIN — SODIUM CHLORIDE, PRESERVATIVE FREE 10 ML: 5 INJECTION INTRAVENOUS at 22:57

## 2021-10-18 ASSESSMENT — PAIN SCALES - GENERAL
PAINLEVEL_OUTOF10: 8
PAINLEVEL_OUTOF10: 8
PAINLEVEL_OUTOF10: 0

## 2021-10-18 NOTE — ED NOTES
Bed: 03  Expected date:   Expected time:   Means of arrival:   Comments:  [address] Mona Dominique RN  10/18/21 8386

## 2021-10-18 NOTE — ED PROVIDER NOTES
Fatty liver     elevated LFT's    Gout     HIGH CHOLESTEROL     HTN (hypertension)     Hypothyroidism     IFG (impaired fasting glucose)     Osteoarthritis     Palpitations     Pyelonephritis 03       SURGICAL HISTORY    Past Surgical History:   Procedure Laterality Date    APPENDECTOMY  1954    BREAST SURGERY      CATARACT REMOVAL WITH IMPLANT Left [id number]    CHOLECYSTECTOMY  12/1970    EPIDURAL STEROID INJECTION Right 11/12/2019    RIGHT LUMBAR FIVE SACRAL ONE EPIDURAL STEROID INJECTION SITE CONFIRMED BY FLUOROSCOPY performed by Sudhakar Ryan MD at [address] Right 2007    cataract     HYSTERECTOMY  5/1971    JOINT REPLACEMENT Left 2001    hip    JOINT REPLACEMENT Right 2002    hip    OTHER SURGICAL HISTORY Right 09/27/2017    LUMPECTOMY, NEEDLE LOCALIZATION AND SENTINEL NODERIGHT LUMPECTOMY, NEEDLE LOCALIZATION AND SENTINEL NODE    THYROIDECTOMY  1968    TONSILLECTOMY  1945       CURRENT MEDICATIONS    Current Outpatient Rx   Medication Sig Dispense Refill    aspirin 81 MG chewable tablet Take 1 tablet by mouth daily 30 tablet 3    levothyroxine (SYNTHROID) 125 MCG tablet TAKE 1 TABLET BY MOUTH DAILY FOR THYROID 90 tablet 1    atorvastatin (LIPITOR) 40 MG tablet TAKE 1/2 TABLET BY MOUTH DAILY FOR HIGH CHOLESTEROL 45 tablet 1    metoprolol succinate (TOPROL XL) 50 MG extended release tablet TAKE 1 TABLET BY MOUTH EVERY DAY FOR HIGH BLOOD PRESSURE 90 tablet 1    oxybutynin (DITROPAN) 5 MG tablet TAKE 1 TABLET BY MOUTH TWICE DAILY FOR BLADDER 180 tablet 1    Multiple Vitamins-Minerals (THERAPEUTIC MULTIVITAMIN-MINERALS) tablet Take 1 tablet by mouth daily      anastrozole (ARIMIDEX) 1 MG tablet TK 1 T PO QD  4    Acetaminophen (TYLENOL 8 HOUR PO) Take 625 mg by mouth 3 times daily          ALLERGIES    Allergies   Allergen Reactions    Lisinopril      Cough.  Vasotec      Cough.          FAMILY HISTORY    Family History   Problem Relation Age of Onset    Cancer Mother         breast / liver CA    Other Mother         pernicious anemia    Cancer Brother         prostate cancer    Alcohol Abuse Sister        SOCIAL HISTORY    Social History     Socioeconomic History    Marital status:      Spouse name: None    Number of children: None    Years of education: None    Highest education level: None   Occupational History    None   Tobacco Use    Smoking status: Never Smoker    Smokeless tobacco: Never Used   Substance and Sexual Activity    Alcohol use: Yes     Alcohol/week: 5.0 - 6.0 standard drinks     Types: 5 - 6 Glasses of wine per week     Comment: per week    Drug use: No    Sexual activity: Yes     Partners: Male   Other Topics Concern    None   Social History Narrative    None     Social Determinants of Health     Financial Resource Strain: Low Risk     Difficulty of Paying Living Expenses: Not hard at all   Food Insecurity: No Food Insecurity    Worried About Running Out of Food in the Last Year: Never true    Marixa of Food in the Last Year: Never true   Transportation Needs:     Lack of Transportation (Medical):      Lack of Transportation (Non-Medical):    Physical Activity:     Days of Exercise per Week:     Minutes of Exercise per Session:    Stress:     Feeling of Stress :    Social Connections:     Frequency of Communication with Friends and Family:     Frequency of Social Gatherings with Friends and Family:     Attends Mandaen Services:     Active Member of Clubs or Organizations:     Attends Club or Organization Meetings:     Marital Status:    Intimate Partner Violence:     Fear of Current or Ex-Partner:     Emotionally Abused:     Physically Abused:     Sexually Abused:        REVIEW OF SYSTEMS    10 systems reviewed, pertinent positives per HPI otherwise noted to be negative    PHYSICAL EXAM  Vitals:    10/18/21 1438   BP: (!) 152/62   Pulse: 122   Resp: 17   Temp:    SpO2: 99%       GENERAL: Patient is well-developed, morbidly obese. Awake and alert. Cooperative. Resting in bed. No apparent distress. HEENT:  Normocephalic, atraumatic. Conjunctiva appear normal. Sclera is non-icteric. External ears are normal.    NECK: Supple with normal ROM. Trachea midline. LUNGS: Equal and symmetric chest rise. Breathing is unlabored. Speaking comfortably in full sentences. Lungs are clear bilaterally to auscultation. Without wheezing, rales, or rhonchi. CADIOVASCULAR:  Regular rate and rhythm. Normal S1-S2 sounds. No murmurs, rubs, or gallops. Capillary refill is brisk in all 4 extremities. Bilateral lower extremities are equal in size, there is no swelling observed. There is no tenderness to palpation. There is no erythema observed or warmth palpated. GI: Soft, nontender, nondistended with positive bowel sounds. No rebound tenderness, guarding or any peritoneal signs. No masses or hepatosplenomegaly    MUSCULOSKELETAL:  No gross deformities or trauma noted. Moving all extremities equally and appropriately. Normal ROM. SKIN: Warm/dry. Skin is intact. No rashes/lesions noted. PSYCHIATRIC: Mood and affect appropriate. Speech is clear and articulate. NEUROLOGIC: Alert and oriented. No focal motor or sensory deficits. LABS  I have reviewed all labs for this visit.    Results for orders placed or performed during the hospital encounter of 10/18/21   CBC Auto Differential   Result Value Ref Range    WBC 11.1 (H) 4.0 - 11.0 K/uL    RBC 3.67 (L) 4.00 - 5.20 M/uL    Hemoglobin 13.9 12.0 - 16.0 g/dL    Hematocrit 40.2 36.0 - 48.0 %    .6 (H) 80.0 - 100.0 fL    MCH 38.0 (H) 26.0 - 34.0 pg    MCHC 34.7 31.0 - 36.0 g/dL    RDW 14.7 12.4 - 15.4 %    Platelets 947 101 - 546 K/uL    MPV 7.1 5.0 - 10.5 fL    Neutrophils % 74.4 %    Lymphocytes % 13.8 %    Monocytes % 10.1 %    Eosinophils % 1.3 %    Basophils % 0.4 %    Neutrophils Absolute 8.2 (H) 1.7 - 7.7 K/uL    Lymphocytes Absolute 1.5 1.0 - 5.1 K/uL    Monocytes Absolute 1.1 0.0 - 1.3 K/uL    Eosinophils Absolute 0.1 0.0 - 0.6 K/uL    Basophils Absolute 0.0 0.0 - 0.2 K/uL   Comprehensive Metabolic Panel w/ Reflex to MG   Result Value Ref Range    Sodium 136 136 - 145 mmol/L    Potassium reflex Magnesium 3.7 3.5 - 5.1 mmol/L    Chloride 99 99 - 110 mmol/L    CO2 26 21 - 32 mmol/L    Anion Gap 11 3 - 16    Glucose 130 (H) 70 - 99 mg/dL    BUN 21 (H) 7 - 20 mg/dL    CREATININE 0.9 0.6 - 1.2 mg/dL    GFR Non-African American 60 (A) >60    GFR African American >60 >60    Calcium 10.5 8.3 - 10.6 mg/dL    Total Protein 6.8 6.4 - 8.2 g/dL    Albumin 4.1 3.4 - 5.0 g/dL    Albumin/Globulin Ratio 1.5 1.1 - 2.2    Total Bilirubin 0.6 0.0 - 1.0 mg/dL    Alkaline Phosphatase 110 40 - 129 U/L    ALT 72 (H) 10 - 40 U/L    AST 84 (H) 15 - 37 U/L    Globulin 2.7 Not Established g/dL   Troponin   Result Value Ref Range    Troponin 0.02 (H) <0.01 ng/mL   Brain Natriuretic Peptide   Result Value Ref Range    Pro- 0 - 449 pg/mL   Urinalysis, reflex to microscopic   Result Value Ref Range    Color, UA Yellow Straw/Yellow    Clarity, UA Clear Clear    Glucose, Ur Negative Negative mg/dL    Bilirubin Urine SMALL (A) Negative    Ketones, Urine TRACE (A) Negative mg/dL    Specific Gravity, UA 1.025 1.005 - 1.030    Blood, Urine TRACE-INTACT (A) Negative    pH, UA 5.5 5.0 - 8.0    Protein, UA TRACE (A) Negative mg/dL    Urobilinogen, Urine 0.2 <2.0 E.U./dL    Nitrite, Urine Negative Negative    Leukocyte Esterase, Urine MODERATE (A) Negative    Microscopic Examination YES     Urine Type NotGiven    Lactate, Sepsis   Result Value Ref Range    Lactic Acid, Sepsis 1.5 0.4 - 1.9 mmol/L   Microscopic Urinalysis   Result Value Ref Range    Hyaline Casts, UA 6-10 (A) 0 - 2 /LPF    WBC, UA 10-20 (A) 0 - 5 /HPF    RBC, UA 0-2 0 - 4 /HPF    Epithelial Cells, UA 2-5 0 - 5 /HPF    Renal Epithelial, UA 2-5 (A) 0 - 1 /HPF    Bacteria, UA Rare (A) None Seen /HPF   EKG 12 Lead   Result Value Ref Range Ventricular Rate 96 BPM    Atrial Rate 96 BPM    P-R Interval 142 ms    QRS Duration 80 ms    Q-T Interval 344 ms    QTc Calculation (Bazett) 434 ms    P Axis 0 degrees    R Axis 4 degrees    T Axis 61 degrees    Diagnosis       Normal sinus rhythmModerate voltage criteria for LVH, may be normal variantNonspecific T wave abnormalityAbnormal ECGConfirmed by RIVAS Renner MD ([id number]) on 10/18/2021 1:27:21 PM       RADIOLOGY    Echo Complete    Result Date: 10/15/2021  Transthoracic Echocardiography Report (TTE)  Demographics   Patient Name       Amaris Sosa   Date of Study      10/15/2021        Gender               Female   Patient Number     [id number]        Date of Birth        1940   Visit Number       [id number]         Age                  78 year(s)   Accession Number   [id number]        Room Number          0206   Corporate ID       [id number]           Sonographer          Simba Garcia, formerly [facility]   Ordering Physician Lyn LAU MD  Interpreting         [address] [place] [address].                                       Physician            Leontine Kanner MD  Procedure Type of Study   TTE procedure:ECHOCARDIOGRAM COMPLETE 2D W DOPPLER W COLOR. Procedure Date Date: 10/15/2021 Start: 09:47 AM Study Location: [facility] - Echo Lab Technical Quality: Poor visualization due to body habitus. Indications:Myocardial infarction. Patient Status: Routine Height: 63 inches Weight: 160 pounds BSA: 1.76 m2 BMI: 28.34 kg/m2 BP: 106/65 mmHg  Conclusions   Summary  Technically difficult examination due to body habitus. Definity® used for  myocardial border enhancement. Normal left ventricle size, wall thickness, and systolic function with a  visually estimated ejection fraction of 55-60%. No regional wall motion abnormalities are seen. Normal left ventricular diastolic filling pressure. Normal right ventricular size and function. No significant valvular disease within the limits of the study.   Systolic pulmonary artery pressure (SPAP) is normal and estimated at 26 mmHg  (right atrial pressure 3 mmHg). No prior studies for comparison. Signature   ------------------------------------------------------------------  Electronically signed by Milvia Hawkins MD (Interpreting  physician) on 10/15/2021 at 12:50 PM  ------------------------------------------------------------------   Findings   Left Ventricle  Normal left ventricle size, wall thickness, and systolic function with a  visually estimated ejection fraction of 55-60%. No regional wall motion abnormalities are seen. Normal left ventricular diastolic filling pressure. No evidence of left ventricular mass or thrombus noted. Mitral Valve  The mitral valve is normal in structure and function. Posterior mitral annular calcification. No evidence of mitral regurgitation. Left Atrium  The left atrium is not well visualized but appears grossly normal in size. Aortic Valve  Individual aortic valve leaflets are not clearly visualized. No evidence of aortic valve regurgitation. Aorta  The aortic root is normal in size. Right Ventricle  Normal right ventricular size and function. TAPSE is measured at 18 mm. S velocity is measured at 12.2 cm/s. Tricuspid Valve  The tricuspid valve is normal in structure. Trace tricuspid regurgitation. Systolic pulmonary artery pressure (SPAP) is normal and estimated at 26 mmHg  (right atrial pressure 3 mmHg). Right Atrium  Right atrium is not well visualized. Pulmonic Valve  The pulmonic valve leaflets are not well visualized. No evidence of pulmonic regurgitation. Pericardial Effusion  Prominent anterior visceral fat pad noted. Pleural Effusion  No pleural effusion noted. Miscellaneous  No obvious masses, thrombi, or vegetations are noted. IVC is normal in size (<2.1 cm) and collapses > 50% with respiration  consistent with normal right atrial pressure (3 mmHg).   M-Mode/2D Measurements (cm)   LV Diastolic Dimension: 4 cm LV Systolic Dimension: 4.33 cm  LV Septum Diastolic: 7.56 cm  LV PW Diastolic: 0.9 cm      AO Root Dimension: 3 cm                               AV Cusp Separation: 1.8 cm                               LA Dimension: 3.9 cm  LVOT: 1.8 cm                 LA Area: 14.2 cm2                               LA volume/Index: 32.5 ml /18 ml/m2  Doppler Measurements   AV Peak Velocity: 122 cm/s     MV Peak E-Wave: 49.7 cm/s  AV Peak Gradient: 5.95 mmHg    MV Peak A-Wave: 85.3 cm/s                                 MV E/A Ratio: 0.58   TR Velocity:239 cm/s  TR Gradient:22.85 mmHg  Estimated RAP:3 mmHg  Estimated RVSP: 26 mmHg  E' Septal Velocity: 6.42 cm/s  E' Lateral Velocity: 6.64 cm/s  E/E' ratio: 7.6   Aortic Valve   Peak Velocity: 122 cm/s  Peak Gradient: 5.95 mmHg   Cusp Separation: 1.8 cm  Aorta   Aortic Root: 3 cm  LVOT Diameter: 1.8 cm      XR CHEST PORTABLE    Result Date: 10/18/2021  EXAMINATION: ONE XRAY VIEW OF THE CHEST 10/18/2021 11:30 am COMPARISON: 14 October 2021 HISTORY: ORDERING SYSTEM PROVIDED HISTORY: dizzy TECHNOLOGIST PROVIDED HISTORY: Reason for exam:->dizzy Reason for Exam: per EMS patient comes from [facility] for dizziness. EMS states facility staff reported hypotension. EMS states low 352'Q systolic BP in route. Patient states \"I just dont feel right Acuity: Acute Type of Exam: Initial FINDINGS: AP portable view of the chest time stamped at 1125 hours is submitted. Heart size is normal.  No vascular congestion, focal consolidation, effusion, or pneumothorax is noted. Osseous and mediastinal structures are age-appropriate. No acute cardiopulmonary process. XR CHEST PORTABLE    Result Date: 10/14/2021  EXAMINATION: ONE XRAY VIEW OF THE CHEST 10/14/2021 5:00 pm COMPARISON: None.  HISTORY: ORDERING SYSTEM PROVIDED HISTORY: shortness of breath TECHNOLOGIST PROVIDED HISTORY: Reason for exam:->shortness of breath Reason for Exam: SOB Acuity: Acute Type of Exam: Initial FINDINGS: The cardiomediastinal silhouette is unremarkable. The lungs are clear. No infiltrate, pleural fluid or focal process is seen. No acute cardiopulmonary disease. CT CHEST PULMONARY EMBOLISM W CONTRAST    Result Date: 10/18/2021  EXAMINATION: CTA OF THE CHEST 10/18/2021 6:47 pm TECHNIQUE: CTA of the chest was performed after the administration of intravenous contrast.  Multiplanar reformatted images are provided for review. MIP images are provided for review. Dose modulation, iterative reconstruction, and/or weight based adjustment of the mA/kV was utilized to reduce the radiation dose to as low as reasonably achievable. COMPARISON: None. HISTORY: ORDERING SYSTEM PROVIDED HISTORY: dizziness, tachycardia TECHNOLOGIST PROVIDED HISTORY: Reason for exam:->dizziness, tachycardia Decision Support Exception - unselect if not a suspected or confirmed emergency medical condition->Emergency Medical Condition ([place]) Reason for Exam: dizzy , hypotensive today, discharged from here yesterday, sob, chest pain, no h/o clots, abd pain Acuity: Acute Type of Exam: Initial Relevant Medical/Surgical History: smoked few yrs in high school FINDINGS: Pulmonary Arteries: Pulmonary arteries are adequately opacified for evaluation. No evidence of intraluminal filling defect to suggest pulmonary embolism. Main pulmonary artery is normal in caliber. Mediastinum: No evidence of mediastinal lymphadenopathy. The heart and pericardium demonstrate no acute abnormality. There is mild calcified plaque throughout the aorta which is mildly dilated and unchanged with no dissection. Lungs/pleura: There mild ground-glass and linear densities scattered along the lung bases posteriorly which is more prominent. There is a 5 mm subpleural nodule along the right upper lobe posteriorly which is unchanged. There is a 3 mm subpleural nodule along the right middle lobe anterior laterally which is unchanged. No effusion is seen.  Upper Abdomen: Limited images of the upper abdomen are unremarkable. Soft Tissues/Bones: There are moderate degenerative changes throughout the spine with no aggressive osseous lesion. No evidence of a pulmonary embolus. Mildly dilated and atherosclerotic thoracic aorta with no aneurysm or dissection which is unchanged. Mild coronary artery calcifications. Suggest cardiology follow-up Small pulmonary nodules scattered in the right lung which are unchanged. Recommend follow-up Mild bibasilar atelectasis or early infiltrates which is more prominent on the left recommend follow-up with serial chest x-rays Fleischner Society guidelines for follow-up and management of incidentally detected pulmonary nodules Multiple Solid Nodules: Nodule size less than 6 mm In a low-risk patient, no routine follow-up. In a high-risk patient, optional CT at 12 months. Nodule size equals 6-8 mm In a low-risk patient, CT at 3-6 months, then consider CT at 18-24 months. In a high-risk patient, CT at 3-6 months, then CT at 18-24 months. Nodule size greater than 8 mm In a low-risk patient, CT at 3-6 months, then consider CT at 18-24 months. In a high-risk patient, CT at 3-6 months, then CT at 18-24 months. - Low risk patients include individuals with minimal or absent history of smoking and other known risk factors. - High risk patients include individuals with a history or smoking or known risk factors. Radiology 2017 http://pubs. rsna.org/doi/full/10.1148/radiol. 0817925222     ED COURSE/MDM  Patient seen and evaluated. Old records reviewed. Diagnostic testing reviewed and results discussed. I have seen and evaluated this patient with supervising physician: Ruth Storey MD. We thoroughly discussed the history, physical exam, diagnostic testing, emergency department course, plan and disposition. Meme Hollis presented to the ED today with above noted complaints. Arrival vital signs: Afebrile and hemodynamically stable.   Well saturated on room air.  Physical exam performed at 1120: Does not reveal adventitious breath sounds on exam.  No reproducible abdominal tenderness to palpation. Patient is neurologically intact. Blood work: Lactic acid levels normal at 1.5. There is a leukocytosis is WBC elevated 11.1, absolute neutrophils elevated at 8.2. No further differential shift. No anemia. Electrolyte abnormality. No evidence of acute kidney injury. There is mild transaminitis present as ALT and AST elevated 72/84. Troponin is elevated 0.02. Patient has had prior elevations of her troponin. BNP is normal at 280. EKG: EKG shows normal sinus rhythm. UA: Does show moderate amount of leukocytes. Microscopic there are 10-20 white blood cells. This will be sent for culture. When compared to patient's to most recent UA is I am concerned that she is developing another infection and therefore will start Rocephin IV while culture is pending. Imaging: Chest x-ray shows no acute findings. Initial orthostatic blood pressures showed blood pressure to be stable but heart rate dropped from 93 upon lying to 128 upon standing. Patient was given 1 L IV fluids. I did review her recent echo that showed no evidence of heart failure, EF 55 to 60%. After the first liter of IV fluids orthostatics were rechecked and patient's blood pressure lying was 148/72 and dropped to 126/72 upon standing. Heart rate lying was 91 and this increased to 126 upon standing. Patient was given a second liter IV fluid and continue to have decrease of her blood pressure from lying to standing as well as tachycardia with changing positions from lying to standing. At this point I do feel the patient will require admission for further evaluation of her dizziness, orthostatic hypotension and UTI.     Medications given in the ED:   Medications   iopamidol (ISOVUE-370) 76 % injection 75 mL (has no administration in time range)   0.9 % sodium chloride bolus (0 mLs IntraVENous Stopped 10/18/21 1429)   cefTRIAXone (ROCEPHIN) 1000 mg IVPB in 50 mL D5W minibag (0 mg IntraVENous Stopped 10/18/21 1617)   0.9 % sodium chloride bolus (0 mLs IntraVENous Stopped 10/18/21 1656)      Meme Hollis will be admitted for further observation and evaluation of Shari Carrasquillo's dizziness, tachycardia, orthostatic BP. As I have deemed necessary from their history, physical and studies, I have considered and evaluated Meme Hollis for the following diagnoses:  DIABETES, INTRACRANIAL HEMORRHAGE, MENINGITIS, SEPSIS SUBARACHNOID HEMORRHAGE, SUBDURAL HEMATOMA, & STROKE. CLINICAL IMPRESSION    1. Dizziness    2. Orthostatic hypotension    3. Tachycardia    4. Acute cystitis without hematuria       DISPOSITION  Admit. Comment: Please note this report has been produced using speech recognition software and may contain errors related to that system including errors in grammar, punctuation, and spelling, as well as words and phrases that may be inappropriate. If there are any questions or concerns please feel free to contact the dictating provider for clarification.        Arnol English, APRN - CNP  10/18/21 1959

## 2021-10-18 NOTE — DISCHARGE SUMMARY
Hospital Medicine Discharge Summary    Patient ID: Glen Peñaloza      Patient's PCP: Sonal Gotti MD    Admit Date: 10/14/2021     Discharge Date: 10/17/2021     Admitting Physician: Mónica Naidu MD     Discharge Physician: Catrachita Stauffer MD     Discharge Diagnoses: Active Hospital Problems    Diagnosis     NSTEMI (non-ST elevated myocardial infarction) ([address] [I21.4]     Chest pain [R07.9]        The patient was seen and examined on day of discharge and this discharge summary is in conjunction with any daily progress note from day of discharge. Hospital Course: This 75-year-old female with a past medical history of hypertension, hyperlipidemia, hypothyroidism, breast cancer admitted with a dyspnea since 10/4/2021, in the emergency room patient was noted to have elevated troponin I, low blood pressure. Cardiology consulted status post cardiac cath on 10/15/2021, mild coronary artery disease/ASHD. 3  This 75-year-old female admitted with a dyspnea on exertion slightly elevated troponin I cardiology consulted continue with aspirin, beta-blocker, statin.  2D echo on 10/15/2021   Normal left ventricle size, wall thickness, and systolic function with a   visually estimated ejection fraction of 55-60%.   No regional wall motion abnormalities are seen.   Normal left ventricular diastolic filling pressure.   Normal right ventricular size and function.   No significant valvular disease within the limits of the study.   Systolic pulmonary artery pressure (SPAP) is normal and estimated at 26 mmHg   (right atrial pressure 3 mmHg). S/P cardiac cath on 10/15/2021 mild coronary artery disease/ASHD recommended continue to treat medically with aspirin, beta blocker, statin no further inpatient cardiac work-up or treatment .     2.  Hypercalcemia on admission improving with hydration patient with history of breast cancer questionable recurrent malignancy monitor closely.   PTH, vitamin D pending. 3.  Hypertension controlled continue with beta-blocker losartan on hold blood pressure controlled. 4.  Hypothyroidism continue with the Synthroid  5.  History of breast cancer status post lumpectomy 9/2017 stable continue with Arimidex. 6.  Debility patient was seen evaluated by physical Occupational Therapy recommended SNF patient initially refused nursing home placement now agreeable waiting for placement. 7.  UTI positive nitrite on 10/14/2021 no culture sent ,started on IV Rocephin on 10/16/21, repeat UA on 10/16/2021 -  8. Constipation started on stool softener and Dulcolax x1 given. Physical Exam Performed:     /70   Pulse 76   Temp 97.5 °F (36.4 °C) (Axillary)   Resp 18   Ht 5' 3\" (1.6 m)   Wt 160 lb 1.6 oz (72.6 kg)   SpO2 98%   BMI 28.36 kg/m²       General appearance:  No apparent distress, appears stated age and cooperative. HEENT:  Normal cephalic, atraumatic without obvious deformity. Pupils equal, round, and reactive to light. Extra ocular muscles intact. Conjunctivae/corneas clear. Neck: Supple, with full range of motion. No jugular venous distention. Trachea midline. Respiratory:  Normal respiratory effort. Clear to auscultation, bilaterally without Rales/Wheezes/Rhonchi. Cardiovascular:  Regular rate and rhythm with normal S1/S2 without murmurs, rubs or gallops. Abdomen: Soft, non-tender, non-distended with normal bowel sounds. Musculoskeletal:  No clubbing, cyanosis or edema bilaterally. Full range of motion without deformity. Skin: Skin color, texture, turgor normal.  No rashes or lesions. Neurologic:  Neurovascularly intact without any focal sensory/motor deficits. Cranial nerves: II-XII intact, grossly non-focal.  Psychiatric:  Alert and oriented, thought content appropriate, normal insight  Capillary Refill: Brisk,< 3 seconds   Peripheral Pulses: +2 palpable, equal bilaterally       Labs:  For convenience and continuity at follow-up the following most recent labs are provided:      CBC:    Lab Results   Component Value Date    WBC 11.1 10/18/2021    HGB 13.9 10/18/2021    HCT 40.2 10/18/2021     10/18/2021       Renal:    Lab Results   Component Value Date     10/18/2021    K 3.7 10/18/2021    CL 99 10/18/2021    CO2 26 10/18/2021    BUN 21 10/18/2021    CREATININE 0.9 10/18/2021    CALCIUM 10.5 10/18/2021         Significant Diagnostic Studies    Radiology:   CT CHEST ABDOMEN PELVIS WO CONTRAST   Final Result   Chest CT:      No acute abnormality detected. 5 mm nodule within the periphery the right upper lobe. See recommendations   below. Abdomen and pelvis CT:      No acute abnormality detected. Extensive diverticulosis of the large bowel, mainly in the region of the   sigmoid colon, but without CT evidence of diverticulitis. RECOMMENDATIONS:   Fleischner Society guidelines for follow-up and management of incidentally   detected pulmonary nodules:      Single Solid Nodule:      Nodule size less than 6 mm   In a low-risk patient, no routine follow-up. In a high-risk patient, optional CT at 12 months. - Low risk patients include individuals with minimal or absent history of   smoking and other known risk factors. - High risk patients include individuals with a history or smoking or known   risk factors. Radiology 2017 http://pubs. rsna.org/doi/full/10.1148/radiol. 8537349367         XR CHEST PORTABLE   Final Result   No acute cardiopulmonary disease.                 Consults:     IP CONSULT TO HOSPITALIST  IP CONSULT TO SOCIAL WORK  IP CONSULT TO CARDIOLOGY  IP CONSULT TO SOCIAL WORK    Disposition: Nursing home    Condition at Discharge: Stable    Discharge Instructions/Follow-up: Follow-up with PCP within a week    Code Status: Full code    Activity: activity as tolerated    Diet: Regular      Discharge Medications:     Discharge Medication List as of 10/17/2021  3:29 PM           Details   aspirin 81 MG chewable tablet Take 1 tablet by mouth daily, Disp-30 tablet, R-3Normal              Details   levothyroxine (SYNTHROID) 125 MCG tablet TAKE 1 TABLET BY MOUTH DAILY FOR THYROID, Disp-90 tablet, R-1Normal      atorvastatin (LIPITOR) 40 MG tablet TAKE 1/2 TABLET BY MOUTH DAILY FOR HIGH CHOLESTEROL, Disp-45 tablet, R-1Normal      metoprolol succinate (TOPROL XL) 50 MG extended release tablet TAKE 1 TABLET BY MOUTH EVERY DAY FOR HIGH BLOOD PRESSURE, Disp-90 tablet, R-1Normal      oxybutynin (DITROPAN) 5 MG tablet TAKE 1 TABLET BY MOUTH TWICE DAILY FOR BLADDER, Disp-180 tablet, R-1Normal      Multiple Vitamins-Minerals (THERAPEUTIC MULTIVITAMIN-MINERALS) tablet Take 1 tablet by mouth dailyHistorical Med      anastrozole (ARIMIDEX) 1 MG tablet TK 1 T PO QD, R-4Historical Med      Acetaminophen (TYLENOL 8 HOUR PO) Take 625 mg by mouth 3 times daily Historical Med             Time Spent on discharge is more than 35 minutes in the examination, evaluation, counseling and review of medications and discharge plan. Signed:    Reji Ortiz MD   10/18/2021      Thank you Jazmin Dukes MD for the opportunity to be involved in this patient's care. If you have any questions or concerns please feel free to contact me at [phone].

## 2021-10-18 NOTE — H&P
Hospital Medicine History & Physical      PCP: Leonides Bee MD    Date of Admission: 10/18/2021    Date of Service: Pt seen/examined on 10/18/2021 and admitted as inpatient with expected LOS greater than two midnights due to medical therapy. Chief Complaint:  Dizziness    History Of Present Illness:     An Ospina is a pleasant 78 y.o female with PMH of HTN, HLD, and hypothyroidism who presented to [facility] with complaint of orthostatic dizziness. History obtained from patient as well as review of the EMR. Per pt, she felt dizzy and unwell while at home today at [facility] [place]. She noted the dizziness while ambulating in the restroom to the sink. She cites that she experiences this sensation any time that she stands and claims it is at random intervals and not related to the taking of her antihypertensives. She states that during this episode she was assisted into a chair and her blood pressure was taken and found to be low, though she does not provide specific readings. She endorses a concomitant fatigue and nausea. She endorses shortness of breath that is worsened on exertion, though denies cough or congestion. She denies associated chest pain and back pain. The patient had recently been evaluated for these same symptoms from 10/14-10/17 where she underwent a cardiac catherization without intervention and echocardiogram by Dr. Kal Ch. She was found to have mild CAD/ASHD and was to be medically managed, thus she was discharged. Today she additionally endorses abdominal pain and claims that she had not had a BM in approximately one week, however she was given colace, dulcolax, and MOM which had resulted in frequent diarrhea yesterday with minimal po intake. She denies any blood in her stool and denies any green discoloration, though she does describe it as \"severely odiferous. \" Given her concern for her continued symptoms, however, she opted to come into the hospital for further evaluation and thus she called EMS. Once in the ED she was noted to be afebrile and hemodynamically stable. She was found to have mild transaminitis, a mildly elevated troponin, and evidence of a UTI per UA. Rocephin was given and a urine culture was obtained and sent. She was also noted to have orthostatic changes in her vital signs and thus she was given 2 liters of normal saline. Given her dizziness and tachycardia a CTPE was ordered but demonstrated no evidence of a PE. Orthostatics were assessed afterward IVF completion and were found to continue to have positional changes, therefore the ED provider felt as if she warranted admission for further investigation into her medical issues. At the time of this assessment the patient is noted to be resting comfortably in bed and in no apparent distress. She denies additional provoking or palliative factors. She denies additional symptoms in including chest pain, back pain, shortness of breath, abdominal pain, n/v/d, numbness, tingling, fever and/or chills. She will be admitted to the hospital for continued evaluation and management of her condition.      Past Medical History:          Diagnosis Date    Breast cancer ([address]     Cancer ([address] 09/2017    right breast cancer status post lumpectomy    Edema     Fatty liver     elevated LFT's    Gout     HIGH CHOLESTEROL     HTN (hypertension)     Hypothyroidism     IFG (impaired fasting glucose)     Osteoarthritis     Palpitations     Pyelonephritis 03     Past Surgical History:          Procedure Laterality Date    APPENDECTOMY  1954    BREAST SURGERY      CATARACT REMOVAL WITH IMPLANT Left [id number]    CHOLECYSTECTOMY  12/1970    EPIDURAL STEROID INJECTION Right 11/12/2019    RIGHT LUMBAR FIVE SACRAL ONE EPIDURAL STEROID INJECTION SITE CONFIRMED BY FLUOROSCOPY performed by Jeanette Breaux MD at [address] Right 2007    cataract     HYSTERECTOMY  5/1971    JOINT REPLACEMENT Left 2001    hip    JOINT REPLACEMENT Right 2002    hip    OTHER SURGICAL HISTORY Right 09/27/2017    LUMPECTOMY, NEEDLE LOCALIZATION AND SENTINEL NODERIGHT LUMPECTOMY, NEEDLE LOCALIZATION AND SENTINEL NODE    THYROIDECTOMY  1968    TONSILLECTOMY  1945     Medications Prior to Admission:      Prior to Admission medications    Medication Sig Start Date End Date Taking? Authorizing Provider   aspirin 81 MG chewable tablet Take 1 tablet by mouth daily 10/18/21   Paul Goins MD   levothyroxine (SYNTHROID) 125 MCG tablet TAKE 1 TABLET BY MOUTH DAILY FOR THYROID 10/13/21   LONG Moreno CNP   atorvastatin (LIPITOR) 40 MG tablet TAKE 1/2 TABLET BY MOUTH DAILY FOR HIGH CHOLESTEROL 9/7/21   Pamela Beverage Day, MD   metoprolol succinate (TOPROL XL) 50 MG extended release tablet TAKE 1 TABLET BY MOUTH EVERY DAY FOR HIGH BLOOD PRESSURE 5/13/21   Pamela Beverage Day, MD   oxybutynin (DITROPAN) 5 MG tablet TAKE 1 TABLET BY MOUTH TWICE DAILY FOR BLADDER 12/13/20   Pamela Beverage Day, MD   Multiple Vitamins-Minerals (THERAPEUTIC MULTIVITAMIN-MINERALS) tablet Take 1 tablet by mouth daily    Historical Provider, MD   anastrozole (ARIMIDEX) 1 MG tablet TK 1 T PO QD 2/9/18   Historical Provider, MD   Acetaminophen (TYLENOL 8 HOUR PO) Take 625 mg by mouth 3 times daily     Historical Provider, MD     Allergies:  Lisinopril and Vasotec    Social History:      The patient currently lives at [facility] [place]. TOBACCO:   reports that she has never smoked. She has never used smokeless tobacco.  ETOH:   reports current alcohol use of about 5.0 - 6.0 standard drinks of alcohol per week. E-Cigarettes/Vaping Use     Questions Responses    E-Cigarette/Vaping Use     Start Date     Passive Exposure     Quit Date     Counseling Given     Comments         Family History:      Reviewed in detail and negative.  Positive as follows:        Problem Relation Age of Onset   Arron Ruiz Cancer Mother         breast / liver CA    Other Mother         pernicious anemia    Cancer Brother         prostate cancer    Alcohol Abuse Sister      REVIEW OF SYSTEMS COMPLETED:   Pertinent positives as noted in the HPI. All other systems reviewed and negative. PHYSICAL EXAM PERFORMED:    /81   Pulse 97   Temp 97.8 °F (36.6 °C) (Oral)   Resp 18   Wt 160 lb (72.6 kg)   SpO2 100%   BMI 28.34 kg/m²     General appearance:  Elderly female in no apparent distress, appears stated age and cooperative. HEENT:  Normal cephalic, atraumatic without obvious deformity. Pupils equal, round, and reactive to light. Extra ocular muscles intact. Conjunctivae/corneas clear. Neck: Supple, with full range of motion. No jugular venous distention. Trachea midline. Respiratory:  Normal respiratory effort. Clear to auscultation, bilaterally without Rales/Wheezes/Rhonchi. Cardiovascular:  Regular rate and rhythm with normal S1/S2 without murmurs, rubs or gallops. Abdomen: Soft, tender to palpation, non-distended with hyperactive bowel sounds. Musculoskeletal:  No clubbing, cyanosis or edema bilaterally. Right upper extremity with soft splint s/p radial access for cardiac catheterization. Full range of motion without deformity in other extremities. Skin: Pale skin, texture, turgor normal.  No rashes or lesions. Scattered ecchymoses. Neurologic:  Neurovascularly intact.  Cranial nerves: II-XII intact, grossly non-focal.  Psychiatric:  Alert and oriented, thought content appropriate, normal insight  Capillary Refill: Brisk,3 seconds, normal  Peripheral Pulses: +2 palpable, equal bilaterally     Labs:     Recent Labs     10/16/21  0600 10/18/21  1109   WBC 6.5 11.1*   HGB 12.3 13.9   HCT 34.8* 40.2   * 139     Recent Labs     10/16/21  0600 10/16/21  1206 10/18/21  1109   * 142 136   K 3.9 4.1 3.7   * 107 99   CO2 21 24 26   BUN 29* 26* 21*   CREATININE 0.7 0.8 0.9   CALCIUM 9.3 10.1 10.5     Recent Labs     10/18/21  1109   AST 84*   ALT 72*   BILITOT 0.6   ALKPHOS 110     Recent Labs     10/18/21  1109 TROPONINI 0.02*     Urinalysis:      Lab Results   Component Value Date    NITRU Negative 10/18/2021    WBCUA 10-20 10/18/2021    BACTERIA Rare 10/18/2021    RBCUA 0-2 10/18/2021    BLOODU TRACE-INTACT 10/18/2021    SPECGRAV 1.025 10/18/2021    GLUCOSEU Negative 10/18/2021     Radiology:     CXR: I have reviewed the CXR with the following interpretation: no acute cardiopulmonary process, as read per radiology. EKG:  I have reviewed the EKG with the following interpretation: normal sinus rhythm; moderate voltage criteria for LVH, may be normal variant; non-specific T wave abnormality; abnormal ECG, as interpreted per cardiology. CT CHEST PULMONARY EMBOLISM W CONTRAST   Final Result   No evidence of a pulmonary embolus. Mildly dilated and atherosclerotic thoracic aorta with no aneurysm or   dissection which is unchanged. Mild coronary artery calcifications. Suggest cardiology follow-up      Small pulmonary nodules scattered in the right lung which are unchanged. Recommend follow-up      Mild bibasilar atelectasis or early infiltrates which is more prominent on   the left recommend follow-up with serial chest x-rays      Fleischner Society guidelines for follow-up and management of incidentally   detected pulmonary nodules      Multiple Solid Nodules:      Nodule size less than 6 mm   In a low-risk patient, no routine follow-up. In a high-risk patient, optional CT at 12 months. Nodule size equals 6-8 mm   In a low-risk patient, CT at 3-6 months, then consider CT at 18-24 months. In a high-risk patient, CT at 3-6 months, then CT at 18-24 months. Nodule size greater than 8 mm   In a low-risk patient, CT at 3-6 months, then consider CT at 18-24 months. In a high-risk patient, CT at 3-6 months, then CT at 18-24 months. - Low risk patients include individuals with minimal or absent history of   smoking and other known risk factors.       - High risk patients include individuals with a history or smoking or known   risk factors. Radiology 2017 http://pubs. rsna.org/doi/full/10.1148/radiol. 0668024098         XR CHEST PORTABLE   Final Result   No acute cardiopulmonary process.            ASSESSMENT:    Active Hospital Problems    Diagnosis Date Noted    HTN (hypertension) [I10]      Priority: High    Hypothyroidism [E03.9]      Priority: High    Hyperlipidemia [E78.5] 01/13/2014     Priority: Medium    Orthostatic lightheadedness [R42] 10/18/2021    UTI (urinary tract infection) [N39.0] 10/18/2021     PLAN:    Recurrent orthostatic dizziness in setting of diarrhea, poor po intake  -hemodynamically stable at this time  -echo on 10/15 with EF 55%-60%  -likely 2/2 persisting UTI, possibly worsened from isovolumetric contraction  -continue abx from ED  -gentle IVF overnight  -up as tolerated  -no indication for further cardiac testing per results from 10/15/21    Abdominal pain  -epigastric across right and left sides, worsened on palpation  -given clinical suspiscion for c-diff per recent abx, c-diff test ordered and pending  -contact isolation  -KUB without acute process    Elevated troponin, 0.02  -no complaint of chest pain  -EKG non-ischemic  -may be a result of recent cardiac catheterization  -trend overnight  -continue home ASA    UTI with moderate leukocyte esterase per UA  -rocephin given in Ed; continued  -urine culture ordered and pending  -cbc in am    Hypertension  -continue toprol    Hyperlipidemia  -continue lipitor  -lipid panel completed 10/15/2021    Hypothyroidism  -continue synthroid    Transaminitis   -appears to be chronic, though levels are slightly elevated at 72/84  -likely secondary to alcohol use; patient reports 5-6 glasses of wine per week  -hepatitis panel ordered given abdominal pain; pending    DVT Prophylaxis: Lovenox    Diet: No diet orders on file     Code Status: Prior    PT/OT Eval Status: no indication for need at this time, will defer pending resolution of orthostasis    Dispo - 2-3 days pending clinical improvement     Lisbeth Fernandes, APRN - CNP    Thank you Leonides Bee MD for the opportunity to be involved in this patient's care.  If you have any questions or concerns please feel free to contact me at [phone].  --------------------------------------------Anticipated Dr. Celia mcrae------------------------

## 2021-10-18 NOTE — ED PROVIDER NOTES
Triage EKG:    The 12 lead EKG was interpreted by me as follows:  Rate: normal with a rate of 96  Rhythm: sinus  Axis: normal  Intervals: normal AR, narrow QRS, normal QTc  ST segments: no ST elevations or depressions  T waves: no abnormal inversions   LVH changes  Non-specific T wave changes: present  Prior EKG comparison: EKG dated 10/14/21 is not significantly different        Shahla Nichols MD  10/18/21 [id number]

## 2021-10-19 LAB
ANION GAP SERPL CALCULATED.3IONS-SCNC: 11 MMOL/L (ref 3–16)
BASOPHILS ABSOLUTE: 0.1 K/UL (ref 0–0.2)
BASOPHILS RELATIVE PERCENT: 0.5 %
BUN BLDV-MCNC: 10 MG/DL (ref 7–20)
CALCIUM SERPL-MCNC: 9.5 MG/DL (ref 8.3–10.6)
CHLORIDE BLD-SCNC: 104 MMOL/L (ref 99–110)
CO2: 23 MMOL/L (ref 21–32)
CREAT SERPL-MCNC: 0.6 MG/DL (ref 0.6–1.2)
EOSINOPHILS ABSOLUTE: 0.3 K/UL (ref 0–0.6)
EOSINOPHILS RELATIVE PERCENT: 2.5 %
GFR AFRICAN AMERICAN: >60
GFR NON-AFRICAN AMERICAN: >60
GLUCOSE BLD-MCNC: 96 MG/DL (ref 70–99)
HAV IGM SER IA-ACNC: NORMAL
HCT VFR BLD CALC: 38.5 % (ref 36–48)
HEMOGLOBIN: 13.3 G/DL (ref 12–16)
HEPATITIS B CORE IGM ANTIBODY: NORMAL
HEPATITIS B SURFACE ANTIGEN INTERPRETATION: NORMAL
HEPATITIS C ANTIBODY INTERPRETATION: NORMAL
LYMPHOCYTES ABSOLUTE: 2.3 K/UL (ref 1–5.1)
LYMPHOCYTES RELATIVE PERCENT: 22.3 %
MCH RBC QN AUTO: 37.8 PG (ref 26–34)
MCHC RBC AUTO-ENTMCNC: 34.6 G/DL (ref 31–36)
MCV RBC AUTO: 109.2 FL (ref 80–100)
MONOCYTES ABSOLUTE: 1 K/UL (ref 0–1.3)
MONOCYTES RELATIVE PERCENT: 10.1 %
NEUTROPHILS ABSOLUTE: 6.6 K/UL (ref 1.7–7.7)
NEUTROPHILS RELATIVE PERCENT: 64.6 %
PDW BLD-RTO: 14.5 % (ref 12.4–15.4)
PLATELET # BLD: 127 K/UL (ref 135–450)
PMV BLD AUTO: 7.7 FL (ref 5–10.5)
POTASSIUM REFLEX MAGNESIUM: 3.6 MMOL/L (ref 3.5–5.1)
RBC # BLD: 3.52 M/UL (ref 4–5.2)
SODIUM BLD-SCNC: 138 MMOL/L (ref 136–145)
TROPONIN: 0.01 NG/ML
TROPONIN: 0.01 NG/ML
URINE CULTURE, ROUTINE: NORMAL
VITAMIN D 1,25-DIHYDROXY: 15.1 PG/ML (ref 19.9–79.3)
WBC # BLD: 10.3 K/UL (ref 4–11)

## 2021-10-19 PROCEDURE — 6360000002 HC RX W HCPCS: Performed by: INTERNAL MEDICINE

## 2021-10-19 PROCEDURE — 6370000000 HC RX 637 (ALT 250 FOR IP): Performed by: INTERNAL MEDICINE

## 2021-10-19 PROCEDURE — 2580000003 HC RX 258: Performed by: INTERNAL MEDICINE

## 2021-10-19 PROCEDURE — 1200000000 HC SEMI PRIVATE

## 2021-10-19 PROCEDURE — 84484 ASSAY OF TROPONIN QUANT: CPT

## 2021-10-19 PROCEDURE — 80048 BASIC METABOLIC PNL TOTAL CA: CPT

## 2021-10-19 PROCEDURE — 6370000000 HC RX 637 (ALT 250 FOR IP): Performed by: NURSE PRACTITIONER

## 2021-10-19 PROCEDURE — 97166 OT EVAL MOD COMPLEX 45 MIN: CPT

## 2021-10-19 PROCEDURE — 97530 THERAPEUTIC ACTIVITIES: CPT

## 2021-10-19 PROCEDURE — 97110 THERAPEUTIC EXERCISES: CPT

## 2021-10-19 PROCEDURE — 97162 PT EVAL MOD COMPLEX 30 MIN: CPT

## 2021-10-19 PROCEDURE — 36415 COLL VENOUS BLD VENIPUNCTURE: CPT

## 2021-10-19 PROCEDURE — 85025 COMPLETE CBC W/AUTO DIFF WBC: CPT

## 2021-10-19 PROCEDURE — 97116 GAIT TRAINING THERAPY: CPT

## 2021-10-19 RX ORDER — ANASTROZOLE 1 MG/1
1 TABLET ORAL DAILY
Status: DISCONTINUED | OUTPATIENT
Start: 2021-10-19 | End: 2021-10-20 | Stop reason: HOSPADM

## 2021-10-19 RX ADMIN — SODIUM CHLORIDE, PRESERVATIVE FREE 10 ML: 5 INJECTION INTRAVENOUS at 21:44

## 2021-10-19 RX ADMIN — ASPIRIN 81 MG: 81 TABLET, CHEWABLE ORAL at 10:04

## 2021-10-19 RX ADMIN — ANASTROZOLE 1 MG: 1 TABLET ORAL at 10:05

## 2021-10-19 RX ADMIN — ENOXAPARIN SODIUM 40 MG: 40 INJECTION SUBCUTANEOUS at 10:04

## 2021-10-19 RX ADMIN — METOPROLOL SUCCINATE 50 MG: 50 TABLET, EXTENDED RELEASE ORAL at 10:04

## 2021-10-19 RX ADMIN — ATORVASTATIN CALCIUM 20 MG: 10 TABLET, FILM COATED ORAL at 21:43

## 2021-10-19 RX ADMIN — LEVOTHYROXINE SODIUM 125 MCG: 0.12 TABLET ORAL at 05:21

## 2021-10-19 RX ADMIN — CEFTRIAXONE SODIUM 1000 MG: 1 INJECTION, POWDER, FOR SOLUTION INTRAMUSCULAR; INTRAVENOUS at 05:25

## 2021-10-19 ASSESSMENT — PAIN SCALES - GENERAL
PAINLEVEL_OUTOF10: 0

## 2021-10-19 NOTE — PROGRESS NOTES
Physical Therapy    Facility/Department: [facility] A2 CARD TELEMETRY  Initial Assessment    NAME: Bertram Nick  : 1940  MRN: [id number]    Date of Service: 10/19/2021    Discharge Recommendations:  Subacute/Skilled Nursing Facility   PT Equipment Recommendations  Equipment Needed: No  Other: defer    Assessment      Patient seen for PT evaluation and gait training. Patient cleared by RN for therapy participation this date. Patient agreeable to therapy. Patient admitted for dizziness. Patient completed transfers with CGA. Pt required min A x2 for gait without RW with HHA x2. Pt ambulated with RW and CGA x25 ft. P.T .will continue to follow throughout LOS. Recommending DC to SNF. Body structures, Functions, Activity limitations: Decreased functional mobility ; Decreased ADL status; Decreased endurance;Decreased strength;Decreased balance  Treatment Diagnosis: decreased balance  Specific instructions for Next Treatment: progress transfers and gait  Prognosis: Good  Decision Making: Medium Complexity  PT Education: Goals;Transfer Training;Disease Specific Education;PT Role;Functional Mobility Training;Plan of Care;General Safety;Orientation;Precautions;Gait Training  Patient Education: pt educated on benefits of OOB mobility and need for 24/7 at this time - demos understanding  Barriers to Learning: none  REQUIRES PT FOLLOW UP: Yes  Activity Tolerance  Activity Tolerance: Patient Tolerated treatment well  Activity Tolerance: 120s/60s in supine and in standing without dizziness; 81 bpm, 94%       Patient Diagnosis(es): The primary encounter diagnosis was Dizziness. Diagnoses of Orthostatic hypotension, Tachycardia, Acute cystitis without hematuria, Transaminitis, and Pulmonary nodules were also pertinent to this visit.      has a past medical history of Breast cancer ([address] Cancer ([address] Edema, Fatty liver, Gout, HIGH CHOLESTEROL, HTN (hypertension), Hypothyroidism, IFG (impaired fasting glucose), Osteoarthritis, Palpitations, and Pyelonephritis. has a past surgical history that includes Thyroidectomy (1968); Tonsillectomy (1945); Appendectomy (1954); Cholecystectomy (12/1970); Hysterectomy (5/1971); eye surgery (Right, 2007); joint replacement (Left, 2001); joint replacement (Right, 2002); Cataract removal with implant (Left, [id number]); other surgical history (Right, 09/27/2017); epidural steroid injection (Right, 11/12/2019); and Breast surgery.     Restrictions  Restrictions/Precautions  Restrictions/Precautions: Isolation, Up as Tolerated, Fall Risk  Position Activity Restriction  Other position/activity restrictions: C. diff rule out, 4 carb choices  Vision/Hearing        Subjective  General  Chart Reviewed: Yes  Patient assessed for rehabilitation services?: Yes  Response To Previous Treatment: Not applicable  Family / Caregiver Present: No  Referring Practitioner: Ashley Ellison  Referral Date : 10/19/21  Diagnosis: dizziness  Follows Commands: Within Functional Limits  Subjective  Subjective: pt in bed, agreeable to therapy  Pain Screening  Patient Currently in Pain: Denies  Vital Signs  Patient Currently in Pain: Denies       Orientation  Orientation  Overall Orientation Status: Within Normal Limits  Social/Functional History  Social/Functional History  Lives With: Alone  Type of Home: House  Home Layout: Two level, Bed/Bath upstairs  Home Access: Stairs to enter with rails  Entrance Stairs - Number of Steps: 1  Bathroom Shower/Tub: Walk-in shower  Bathroom Toilet: Standard  Bathroom Equipment: Grab bars in shower, Built-in shower seat, Toilet raiser  Home Equipment: Rolling walker, Standard walker, Cane, [employer] cane, [address] Help From: Family  ADL Assistance: Independent  Homemaking Assistance: Independent  Homemaking Responsibilities: Yes  Ambulation Assistance: Independent  Transfer Assistance: Independent  Active : Yes  Mode of Transportation: Car (pt reports no difficulty getting in/out)  Additional Comments: daughters provide grocery shopping, light housekeeping prn, denies falls in last 3 mos  Objective     Observation/Palpation  Posture: Good  Observation: good standing posture    AROM RLE (degrees)  RLE AROM: WFL  AROM LLE (degrees)  LLE AROM : WFL  Strength RLE  Strength RLE: WFL  Strength LLE  Strength LLE: WFL     Sensation  Overall Sensation Status: Impaired  Bed mobility  Supine to Sit: Moderate assistance  Sit to Supine: Unable to assess  Transfers  Sit to Stand: Contact guard assistance  Stand to sit: Contact guard assistance  Ambulation  Ambulation?: Yes  More Ambulation?: Yes  Ambulation 1  Surface: level tile  Device: No Device  Assistance: Minimal assistance;2 Person assistance (min A x2)  Quality of Gait: Pt ambulates with slow endy, wide HARINI, moderate nadege lateral sway and unsteadiness with short step length and minimal clearance. Gait Deviations: Slow Endy; Increased HARINI; Decreased step length;Decreased step height;Shuffles  Distance: 15 ft  Ambulation 2  Surface - 2: level tile  Device 2: Rolling Walker  Assistance 2: Contact guard assistance  Quality of Gait 2: Pt ambulates with use of RW with improved balance, steadiness without overt LOB; however, continued wide HARINI and short step length with minimal clearance. Gait Deviations: Slow Endy; Increased HARINI; Decreased step length;Decreased step height  Distance: 25 ft              Plan   Plan  Times per week: 3-5  Plan weeks: 10/25  Specific instructions for Next Treatment: progress transfers and gait  Current Treatment Recommendations: Strengthening, Transfer Training, Endurance Training, Patient/Caregiver Education & Training, Balance Training, Gait Training, Home Exercise Program, Functional Mobility Training, Stair training, Safety Education & Training  Safety Devices  Type of devices: Patient at risk for falls, Left in chair, Gait belt, Nurse notified (with OT in chair at end of session)    AM-PAC Score  AM-PAC Inpatient Mobility Raw Score : 16 (10/19/21 1552)  AM-PAC Inpatient T-Scale Score : 40.78 (10/19/21 1552)  Mobility Inpatient CMS 0-100% Score: 54.16 (10/19/21 1552)  Mobility Inpatient CMS G-Code Modifier : CK (10/19/21 1552)          Goals  Short term goals  Time Frame for Short term goals: 10/25  Short term goal 1: pt will complete supine<>sit with SBA. Short term goal 2: Pt will complete transfers with LRAD and supervision. Short term goal 3: Pt will ambulate 50 ft with RW and supervision. Short term goal 4: Pt will tolerate 12-15 reps of LE Exercises by 10/22 to progress strength and balance. Patient Goals   Patient goals : \"Go home\"       Therapy Time   Individual Concurrent Group Co-treatment   Time In 1503         Time Out 1525         Minutes 22         Timed Code Treatment Minutes: 12 Minutes (10 min eval)     If pt is unable to be seen after this session, please let this note serve as discharge summary. Please see case management note for discharge disposition. Thank you.     Tricia Hdez, PT

## 2021-10-19 NOTE — FLOWSHEET NOTE
Orthostatics are as followed     10/19/21 0948   Vital Signs   Blood Pressure Lying 149/73   Pulse Lying 96 PER MINUTE   Blood Pressure Sitting 151/87   Pulse Sitting 108 PER MINUTE   Blood Pressure Standing 146/66   Pulse Standing 112 PER MINUTE

## 2021-10-19 NOTE — PROGRESS NOTES
Occupational Therapy   Occupational Therapy Initial Assessment/Treatment  Date: 10/19/2021   Patient Name: Delmer Gonzalez  MRN: [id number]     : 1940    Date of Service: 10/19/2021    Discharge Recommendations:  [address]  OT Equipment Recommendations  Equipment Needed: No    Assessment   Performance deficits / Impairments: Decreased functional mobility ; Decreased balance;Decreased ADL status; Decreased endurance;Decreased high-level IADLs  Assessment: Pt is an 77yo woman admitted for UTI and orthostaic BP. Pt was indep and living alone at baseline. Pt presented with the above deficits this day and is functioning slightly below baseline requring CGA for functional mobility with AD and decreased endurance for tasks. Pt demo good tolerance of BUE therex and pt would benefit from acute OT. OT recommends d/c to SNF (pt was previously at [facility] however stated she would not return there and would like to go to the [facility]). Prognosis: Good  Decision Making: Medium Complexity  OT Education: OT Role;Plan of Care;Home Exercise Program;ADL Adaptive Strategies;Transfer Training;Energy Conservation  Patient Education: Disease specific, use of call light for nursing, and importance of OOB activity  Barriers to Learning: None preceived  REQUIRES OT FOLLOW UP: Yes  Activity Tolerance  Activity Tolerance: Patient Tolerated treatment well  Safety Devices  Safety Devices in place: Yes  Type of devices: Gait belt;Patient at risk for falls;Call light within reach; Left in chair;Chair alarm in place;Nurse notified  Restraints  Initially in place: No         Patient Diagnosis(es): The primary encounter diagnosis was Dizziness. Diagnoses of Orthostatic hypotension, Tachycardia, Acute cystitis without hematuria, Transaminitis, and Pulmonary nodules were also pertinent to this visit.    has a past medical history of Breast cancer ([facility] [address] Cancer ([facility] [address] Edema, Fatty liver, Gout, HIGH CHOLESTEROL, HTN Within Functional Limits  Observation/Palpation  Posture: Good  Observation: good standing posture  Body Mechanics: waddle gait  Balance  Sitting Balance: Supervision  Standing Balance: Contact guard assistance  Standing Balance  Time: 1.5 min, 2 min  Activity: short household distance amb 1x HHA and 1x RW  Functional Mobility  Functional - Mobility Device: Rolling Walker  Activity: Other  Assist Level: Contact guard assistance  Functional Mobility Comments: Min A with HHA  ADL  Feeding: Independent  Grooming: Setup  LE Dressing: Supervision;Setup  Toileting: Stand by assistance  Tone RUE  RUE Tone: Normotonic  Tone LUE  LUE Tone: Normotonic  Coordination  Movements Are Fluid And Coordinated: Yes     Bed mobility  Supine to Sit: Moderate assistance  Sit to Supine: Unable to assess (Pt left up in chair)  Transfers  Stand Step Transfers: Minimal assistance (Min A  with HHA and CGA with RW)  Sit to stand: Contact guard assistance  Stand to sit: Contact guard assistance     Cognition  Overall Cognitive Status: WFL     Sensation  Overall Sensation Status: Impaired  Type of ROM/Therapeutic Exercise  Type of ROM/Therapeutic Exercise: AROM  Comment: BUE seated in chair  Exercises  Shoulder Depression: x15  Shoulder Elevation: x15  Shoulder Flexion: x15  Shoulder Extension: x15  Horizontal ABduction: x15  Horizontal ADduction: x15  Grasp/Release: x15  Other: Glute flexion     LUE AROM (degrees)  LUE AROM : WFL  Left Hand AROM (degrees)  Left Hand AROM: WFL  RUE AROM (degrees)  RUE AROM : WFL  Right Hand AROM (degrees)  Right Hand AROM: WFL  LUE Strength  Gross LUE Strength: WFL  L Hand General: 4/5  RUE Strength  Gross RUE Strength: WFL  R Hand General: 4/5    Plan   Plan  Times per week: 3-5x/wk while in acute  Plan weeks: 1wk (10//26) unless noted elsewhere  Current Treatment Recommendations: Strengthening, Safety Education & Training, Self-Care / ADL, Patient/Caregiver Education & Training, Cognitive/Perceptual Training, Functional Mobility Training, Home Management Training, Endurance Training    AM-PAC Score  AM-PAC Inpatient Daily Activity Raw Score: 19 (10/19/21 1547)  AM-PAC Inpatient ADL T-Scale Score : 40.22 (10/19/21 1547)  ADL Inpatient CMS 0-100% Score: 42.8 (10/19/21 1547)  ADL Inpatient CMS G-Code Modifier : CK (10/19/21 1547)    Goals  Short term goals  Time Frame for Short term goals: 1wk (10/26)  Short term goal 1: Pt will complete toilet t/fs with Mod I  Short term goal 2: Pt will complete LB ADLs with Mod I  Short term goal 3: Pt will complete grooming at sink for 5 min in stance with SPV by 10/23  Short term goal 4: Pt will complete 15-20x BUE therex for increase in functional strength  Patient Goals   Patient goals : \"to get my strength back\"       Therapy Time   Individual Concurrent Group Co-treatment   Time In 1502         Time Out 1538         Minutes 36         Timed Code Treatment Minutes: 26 Minutes (10 min eval)       Chrissy Fernandez OTR/L  If pt is unable to be seen after this session, please let this note serve as discharge summary. Please see case management note for discharge disposition. Thank you.

## 2021-10-19 NOTE — PROGRESS NOTES
Comprehensive Nutrition Assessment    Type and Reason for Visit:  Initial, Positive Nutrition Screen    Nutrition Recommendations/Plan:   1. Continue regular, carb control diet  2. Start Glucerna once daily  3. Encourage po intake  4. Monitor po intakes, nutrition adequacy, weights, pertinent labs, BMs    Nutrition Assessment:  Pt is positve screen for decreased appetite. Pt 77yo female admitted r/t dizziness. Currently on regualr, carb control diet. Pt reports decreased appetite over the last 2 weeks, stating \"I don't know why\". Pt had % intake of breakfast tray this morning per EMR. Pt willing to try ONS once daily, added to dinner tray. Will monitor acceptance. Pt reported wt being \"pretty stable\". Pt reported N/D prior to today, no c/o N/V/D during visit this morning. Will continue to monitor. Malnutrition Assessment:  Malnutrition Status: At risk for malnutrition (Comment)    Context:  Acute Illness       Estimated Daily Nutrient Needs:  Energy (kcal):  3801-9344; Weight Used for Energy Requirements:  Ideal (52.3kg)     Protein (g):  52-63; Weight Used for Protein Requirements:  Ideal (1-1.2)         Method Used for Fluid Requirements:  1 ml/kcal      Nutrition Related Findings:  BG <150mg/dL since admit. Labs reviewed. Wounds:  None (scattered bruising)       Current Nutrition Therapies:    ADULT DIET; Regular; 3 carb choices (45 gm/meal)  Adult Oral Nutrition Supplement; Diabetic Oral Supplement    Anthropometric Measures:  · Height: 5' 3\" (160 cm)  · Current Body Weight: 171 lb 12.8 oz (77.9 kg)     · Ideal Body Weight: 115 lbs; % Ideal Body Weight 149.4 %   · BMI: 30.4    · BMI Categories: Obese Class 1 (BMI 30.0-34. 9)       Nutrition Diagnosis:   · Inadequate energy intake related to inadequate protein-energy intake as evidenced by poor intake prior to admission (pt reported poor po intake PTA - for 2 weeks.)      Nutrition Interventions:   Food and/or Nutrient Delivery:  Continue Current Diet, Start Oral Nutrition Supplement  Nutrition Education/Counseling:  No recommendation at this time   Coordination of Nutrition Care:  Continue to monitor while inpatient    Goals:  Pt will consume 50% or greater of meals and ONS during this admission       Nutrition Monitoring and Evaluation:   Behavioral-Environmental Outcomes:  None Identified   Food/Nutrient Intake Outcomes:  Food and Nutrient Intake, Supplement Intake  Physical Signs/Symptoms Outcomes:  Diarrhea, Nausea or Vomiting, Nutrition Focused Physical Findings     Discharge Planning:     Too soon to determine     Electronically signed by Yvette Braga on 10/19/21 at 12:01 PM EDT    Contact: [phone]

## 2021-10-19 NOTE — PROGRESS NOTES
Hospitalist Progress Note      PCP: Vernon Craven MD    Date of Admission: 10/18/2021    Chief Complaint on Admission: Dizziness    History Of Present Illness:      Wilfrido Andres is a pleasant 78 y.o female with PMH of HTN, HLD, and hypothyroidism who presented to [facility] with complaint of orthostatic dizziness. History obtained from patient as well as review of the EMR. Per pt, she felt dizzy and unwell while at home today at [facility]. She noted the dizziness while ambulating in the restroom to the sink. She cites that she experiences this sensation any time that she stands and claims it is at random intervals and not related to the taking of her antihypertensives. She states that during this episode she was assisted into a chair and her blood pressure was taken and found to be low, though she does not provide specific readings. She endorses a concomitant fatigue and nausea. She endorses shortness of breath that is worsened on exertion, though denies cough or congestion. She denies associated chest pain and back pain. The patient had recently been evaluated for these same symptoms from 10/14-10/17 where she underwent a cardiac catherization without intervention and echocardiogram by Dr. Arjun Melvin. She was found to have mild CAD/ASHD and was to be medically managed, thus she was discharged. Today she additionally endorses abdominal pain and claims that she had not had a BM in approximately one week, however she was given colace, dulcolax, and MOM which had resulted in frequent diarrhea yesterday with minimal po intake. She denies any blood in her stool and denies any green discoloration, though she does describe it as \"severely odiferous. \" Given her concern for her continued symptoms, however, she opted to come into the hospital for further evaluation and thus she called EMS. Once in the ED she was noted to be afebrile and hemodynamically stable.  She was found to have mild transaminitis, a mildly elevated troponin, and evidence of a UTI per UA. Rocephin was given and a urine culture was obtained and sent. She was also noted to have orthostatic changes in her vital signs and thus she was given 2 liters of normal saline. Given her dizziness and tachycardia a CTPE was ordered but demonstrated no evidence of a PE. Orthostatics were assessed afterward IVF completion and were found to continue to have positional changes, therefore the ED provider felt as if she warranted admission for further investigation into her medical issues. At the time of this assessment the patient is noted to be resting comfortably in bed and in no apparent distress. She denies additional provoking or palliative factors. She denies additional symptoms in including chest pain, back pain, shortness of breath, abdominal pain, n/v/d, numbness, tingling, fever and/or chills. She will be admitted to the hospital for continued evaluation and management of her condition. Pt Seen/Examined and Chart Reviewed. Admitting dx: Orthostatic hypotension in the setting of LH, diarrhea and poor po intake. SUBJECTIVE:   BP vitals:  Orthostatics: Every shift. Receiving IVF Bolus. No further episodes of diarrhea. Encouraged to eat breakfast tray. Appetite improving. Patient states that in positition changes she continues to feel light headed, but it is improved from yesterday. No new medical concerns  Bedside rounding with nursing completed.      OBJECTIVE:     Allergies  Lisinopril and Vasotec    Medications      Scheduled Meds:   sodium chloride flush  5-40 mL IntraVENous 2 times per day    enoxaparin  40 mg SubCUTAneous Daily    cefTRIAXone (ROCEPHIN) IV  1,000 mg IntraVENous Q24H    aspirin  81 mg Oral Daily    atorvastatin  20 mg Oral Nightly    levothyroxine  125 mcg Oral Daily    metoprolol succinate  50 mg Oral Daily    lidocaine  1 patch TransDERmal Daily       Infusions:   sodium chloride      sodium chloride 125 mL/hr at 10/18/21 2257       PRN Meds:  sodium chloride flush, sodium chloride, ondansetron **OR** ondansetron, polyethylene glycol, acetaminophen **OR** acetaminophen, traMADol    Intake and Output     Intake/Output Summary (Last 24 hours) at 10/19/2021 0711  Last data filed at 10/19/2021 9364  Gross per 24 hour   Intake 250 ml   Output 650 ml   Net -400 ml       Vitals    BP (!) 119/59   Pulse 94   Temp 98.3 °F (36.8 °C) (Oral)   Resp 18   Ht 5' 3\" (1.6 m)   Wt 171 lb 12.8 oz (77.9 kg)   SpO2 99%   BMI 30.43 kg/m²     Exam:  General appearance:  Elderly female in no apparent distress, appears stated age and cooperative. HEENT:  Normal cephalic, atraumatic without obvious deformity. Pupils equal, round, and reactive to light. Extra ocular muscles intact. Conjunctivae/corneas clear. Neck: Supple, with full range of motion. No jugular venous distention. Trachea midline. Respiratory:  Normal respiratory effort. Clear to auscultation, bilaterally without Rales/Wheezes/Rhonchi. Cardiovascular:  Regular rate and rhythm with normal S1/S2 without murmurs, rubs or gallops. Abdomen: Soft, tender to palpation, non-distended with hyporactive bowel sounds. Musculoskeletal:  No clubbing, cyanosis or edema bilaterally. Right upper extremity with soft splint s/p radial access for cardiac catheterization. Full range of motion without deformity in other extremities. Skin: Pale skin, texture, turgor normal.  No rashes or lesions. Scattered ecchymoses. Neurologic:  Neurovascularly intact.  Cranial nerves: II-XII intact, grossly non-focal.  Psychiatric:  Alert and oriented, thought content appropriate, normal insight  Capillary Refill: Brisk,3 seconds, normal  Peripheral Pulses: +2 palpable, equal bilaterally        Data    Recent Labs     10/18/21  1109 10/19/21  0532   WBC 11.1* 10.3   HGB 13.9 13.3   HCT 40.2 38.5    127*      Recent Labs     10/16/21  1206 10/18/21  1109 10/19/21  0532    136 138   K 4.1 3.7 3.6  99 104   CO2 24 26 23   BUN 26* 21* 10   CREATININE 0.8 0.9 0.6     Recent Labs     10/18/21  1109   AST 84*   ALT 72*   BILITOT 0.6   ALKPHOS 110     No results for input(s): INR in the last 72 hours.   Recent Labs     10/18/21  1109 10/19/21  0023 10/19/21  0532   TROPONINI 0.02* 0.01 0.01       Consults:     IP CONSULT TO HOSPITALIST    ASSESSMENT AND PLAN      Active Hospital Problems    Diagnosis Date Noted    HTN (hypertension) [I10]      Priority: High    Hypothyroidism [E03.9]      Priority: High    Hyperlipidemia [E78.5] 01/13/2014     Priority: Medium    Orthostatic lightheadedness [R42] 10/18/2021    UTI (urinary tract infection) [N39.0] 10/18/2021     PLAN:     Recurrent orthostatic dizziness in setting of diarrhea, poor po intake, and acute cystitis  -hemodynamically stable at this time  -echo on 10/15 with EF 55%-60%  -likely 2/2 persisting UTI, possibly worsened from isovolumetric contraction  - abx : CTX  -gentle IVF overnight  -up as tolerated  -no indication for further cardiac testing per results from 10/15/21  -Meds: Remove Oxybutinin home med for contribution to orthostasis.      Abdominal pain  -Secondary to acute gastroenteritis, Pain improving  -epigastric across right and left sides, worsened on palpation  -given clinical suspiscion for c-diff per recent abx, c-diff test ordered and pending  -contact isolation  -KUB without acute process     Elevated troponin, 0.02  -no complaint of chest pain  -EKG non-ischemic  -may be a result of recent cardiac catheterization  -trend overnight  -continue home ASA     UTI with moderate leukocyte esterase per UA  -rocephin given in Ed; continued  -urine culture ordered and pending  -cbc in am     Hypertension  -continue toprol     Hyperlipidemia  -continue lipitor  -lipid panel completed 10/15/2021     Hypothyroidism  -continue synthroid     Transaminitis   -appears to be chronic, though levels are slightly elevated at 72/84  -likely secondary to alcohol use; patient reports 5-6 glasses of wine per week  -hepatitis panel ordered given abdominal pain; pending    Breast Cancer to R breast 2019  -No surgical intervention  -NO XRT required.   -Meds: Arimidex daily  -Follow up with Dr Pb Tariq as outpt as routinely scheduled.      DVT Prophylaxis: Lovenox     Diet: 3 gram carbs.       Code Status: Prior     PT/OT Eval Status: no indication for need at this time, will defer pending resolution of orthostasis     Dispo - 2-3 days pending clinical improvement  Francine Hameed MD

## 2021-10-19 NOTE — PLAN OF CARE
Problem: Falls - Risk of:  Goal: Will remain free from falls  Description: Will remain free from falls  Outcome: Ongoing   Pt will remain free from falls throughout hospital stay. Fall precautions in place, bed alarm on, bed in lowest position with wheels locked and side rails 2/4 up. Room door open and hourly rounding completed. Will continue to monitor throughout shift. Problem: Pain:  Goal: Pain level will decrease  Description: Pain level will decrease  Outcome: Ongoing   Pt will be satisfied with pain control. Pt uses numeric pain rating scale with reassessments after pain med administration. Will continue to monitor progression throughout shift.

## 2021-10-19 NOTE — PLAN OF CARE
Problem: Nutrition  Goal: Optimal nutrition therapy  Outcome: Ongoing  Note: Nutrition Problem #1: Inadequate energy intake  Intervention: Food and/or Nutrient Delivery: Continue Current Diet, Start Oral Nutrition Supplement  Nutritional Goals: Pt will consume 50% or greater of meals and ONS during this admission

## 2021-10-20 VITALS
BODY MASS INDEX: 30.41 KG/M2 | HEIGHT: 63 IN | SYSTOLIC BLOOD PRESSURE: 130 MMHG | HEART RATE: 81 BPM | WEIGHT: 171.6 LBS | TEMPERATURE: 97.6 F | DIASTOLIC BLOOD PRESSURE: 75 MMHG | RESPIRATION RATE: 16 BRPM | OXYGEN SATURATION: 96 %

## 2021-10-20 LAB
BASOPHILS ABSOLUTE: 0 K/UL (ref 0–0.2)
BASOPHILS RELATIVE PERCENT: 0.5 %
EOSINOPHILS ABSOLUTE: 0.4 K/UL (ref 0–0.6)
EOSINOPHILS RELATIVE PERCENT: 4.5 %
HCT VFR BLD CALC: 36 % (ref 36–48)
HEMOGLOBIN: 12.5 G/DL (ref 12–16)
LYMPHOCYTES ABSOLUTE: 1.7 K/UL (ref 1–5.1)
LYMPHOCYTES RELATIVE PERCENT: 22.5 %
MCH RBC QN AUTO: 38.3 PG (ref 26–34)
MCHC RBC AUTO-ENTMCNC: 34.8 G/DL (ref 31–36)
MCV RBC AUTO: 109.9 FL (ref 80–100)
MONOCYTES ABSOLUTE: 0.9 K/UL (ref 0–1.3)
MONOCYTES RELATIVE PERCENT: 12 %
NEUTROPHILS ABSOLUTE: 4.7 K/UL (ref 1.7–7.7)
NEUTROPHILS RELATIVE PERCENT: 60.5 %
PDW BLD-RTO: 14 % (ref 12.4–15.4)
PLATELET # BLD: 123 K/UL (ref 135–450)
PMV BLD AUTO: 7.5 FL (ref 5–10.5)
RBC # BLD: 3.27 M/UL (ref 4–5.2)
SARS-COV-2, NAAT: NOT DETECTED
WBC # BLD: 7.8 K/UL (ref 4–11)

## 2021-10-20 PROCEDURE — 6360000002 HC RX W HCPCS: Performed by: INTERNAL MEDICINE

## 2021-10-20 PROCEDURE — 6370000000 HC RX 637 (ALT 250 FOR IP): Performed by: INTERNAL MEDICINE

## 2021-10-20 PROCEDURE — 85025 COMPLETE CBC W/AUTO DIFF WBC: CPT

## 2021-10-20 PROCEDURE — 2580000003 HC RX 258: Performed by: INTERNAL MEDICINE

## 2021-10-20 PROCEDURE — 36415 COLL VENOUS BLD VENIPUNCTURE: CPT

## 2021-10-20 PROCEDURE — 6370000000 HC RX 637 (ALT 250 FOR IP): Performed by: NURSE PRACTITIONER

## 2021-10-20 PROCEDURE — 87635 SARS-COV-2 COVID-19 AMP PRB: CPT

## 2021-10-20 RX ADMIN — ASPIRIN 81 MG: 81 TABLET, CHEWABLE ORAL at 09:24

## 2021-10-20 RX ADMIN — METOPROLOL SUCCINATE 50 MG: 50 TABLET, EXTENDED RELEASE ORAL at 09:24

## 2021-10-20 RX ADMIN — ENOXAPARIN SODIUM 40 MG: 40 INJECTION SUBCUTANEOUS at 09:25

## 2021-10-20 RX ADMIN — CEFTRIAXONE SODIUM 1000 MG: 1 INJECTION, POWDER, FOR SOLUTION INTRAMUSCULAR; INTRAVENOUS at 06:06

## 2021-10-20 RX ADMIN — SODIUM CHLORIDE 25 ML: 9 INJECTION, SOLUTION INTRAVENOUS at 06:03

## 2021-10-20 RX ADMIN — ANASTROZOLE 1 MG: 1 TABLET ORAL at 09:24

## 2021-10-20 RX ADMIN — SODIUM CHLORIDE, PRESERVATIVE FREE 10 ML: 5 INJECTION INTRAVENOUS at 09:25

## 2021-10-20 RX ADMIN — LEVOTHYROXINE SODIUM 125 MCG: 0.12 TABLET ORAL at 05:57

## 2021-10-20 ASSESSMENT — PAIN SCALES - GENERAL
PAINLEVEL_OUTOF10: 0
PAINLEVEL_OUTOF10: 0

## 2021-10-20 NOTE — PROGRESS NOTES
Report given to [facility] at [facility] by [facility]. All questions answered.  Transport scheduled for 4pm.

## 2021-10-20 NOTE — PLAN OF CARE
Problem: Falls - Risk of:  Goal: Will remain free from falls  Description: Will remain free from falls  10/20/2021 1034 by Casey Olmos RN  Outcome: Ongoing  Note: Pt will remain free from falls throughout hospital stay. Fall precautions in place, bed alarm on, bed in lowest position with wheels locked and side rails 2/4 up. Room door open and hourly rounding completed. Will continue to monitor throughout shift. Problem: Pain:  Goal: Pain level will decrease  Description: Pain level will decrease  10/20/2021 1034 by Casey Olmos RN  Outcome: Ongoing  Note: Pt will be satisfied with pain control. Pt uses numeric pain rating scale with reassessments after pain med administration. Patient denies pain at this time. Will continue to monitor progression throughout shift.

## 2021-10-20 NOTE — PROGRESS NOTES
Hospitalist Progress Note      PCP: Seble Huggins MD    Date of Admission: 10/18/2021    Chief Complaint on Admission: Dizziness    History Of Present Illness:      Frederick Mcintosh is a pleasant 78 y.o female with PMH of HTN, HLD, and hypothyroidism who presented to [facility] with complaint of orthostatic dizziness. History obtained from patient as well as review of the EMR. Per pt, she felt dizzy and unwell while at home today at [facility] [place]. She noted the dizziness while ambulating in the restroom to the sink. She cites that she experiences this sensation any time that she stands and claims it is at random intervals and not related to the taking of her antihypertensives. She states that during this episode she was assisted into a chair and her blood pressure was taken and found to be low, though she does not provide specific readings. She endorses a concomitant fatigue and nausea. She endorses shortness of breath that is worsened on exertion, though denies cough or congestion. She denies associated chest pain and back pain. The patient had recently been evaluated for these same symptoms from 10/14-10/17 where she underwent a cardiac catherization without intervention and echocardiogram by Dr. Noemi Villalta. She was found to have mild CAD/ASHD and was to be medically managed, thus she was discharged. Today she additionally endorses abdominal pain and claims that she had not had a BM in approximately one week, however she was given colace, dulcolax, and MOM which had resulted in frequent diarrhea yesterday with minimal po intake. She denies any blood in her stool and denies any green discoloration, though she does describe it as \"severely odiferous. \" Given her concern for her continued symptoms, however, she opted to come into the hospital for further evaluation and thus she called EMS. Once in the ED she was noted to be afebrile and hemodynamically stable.  She was found to have mild transaminitis, a mildly elevated troponin, and evidence of a UTI per UA. Rocephin was given and a urine culture was obtained and sent. She was also noted to have orthostatic changes in her vital signs and thus she was given 2 liters of normal saline. Given her dizziness and tachycardia a CTPE was ordered but demonstrated no evidence of a PE. Orthostatics were assessed afterward IVF completion and were found to continue to have positional changes, therefore the ED provider felt as if she warranted admission for further investigation into her medical issues. At the time of this assessment the patient is noted to be resting comfortably in bed and in no apparent distress. She denies additional provoking or palliative factors. She denies additional symptoms in including chest pain, back pain, shortness of breath, abdominal pain, n/v/d, numbness, tingling, fever and/or chills. She will be admitted to the hospital for continued evaluation and management of her condition. Pt Seen/Examined and Chart Reviewed. Admitting dx: Orthostatic hypotension in the setting of LH, diarrhea and poor po intake. SUBJECTIVE:   BP vitals:  Orthostatics: Every shift. Laying 144/72  HR 78  Sitting 150/80  HR 89   Standing  141/80       No further episodes of diarrhea. Encouraged to eat breakfast tray. Appetite improving. Patient states that in positition changes she continues to feel light headed, but it is improved from yesterday. Moving better this AM.  Completed IVF bolus. No new medical concerns  Bedside rounding with nursing completed.      OBJECTIVE:     Allergies  Lisinopril and Vasotec    Medications      Scheduled Meds:   anastrozole  1 mg Oral Daily    sodium chloride flush  5-40 mL IntraVENous 2 times per day    enoxaparin  40 mg SubCUTAneous Daily    cefTRIAXone (ROCEPHIN) IV  1,000 mg IntraVENous Q24H    aspirin  81 mg Oral Daily    atorvastatin  20 mg Oral Nightly    levothyroxine  125 mcg Oral Daily    metoprolol succinate  50 mg Oral Daily    lidocaine  1 patch TransDERmal Daily       Infusions:   sodium chloride 25 mL (10/20/21 0603)    sodium chloride Stopped (10/19/21 1001)       PRN Meds:  sodium chloride flush, sodium chloride, ondansetron **OR** ondansetron, polyethylene glycol, acetaminophen **OR** acetaminophen, traMADol    Intake and Output     Intake/Output Summary (Last 24 hours) at 10/20/2021 0813  Last data filed at 10/20/2021 7462  Gross per 24 hour   Intake 1205 ml   Output 900 ml   Net 305 ml       Vitals    /80   Pulse 77   Temp 97.3 °F (36.3 °C) (Oral)   Resp 18   Ht 5' 3\" (1.6 m)   Wt 171 lb 9.6 oz (77.8 kg)   SpO2 97%   BMI 30.40 kg/m²     Exam:  General appearance:  Elderly female in no apparent distress, appears stated age and cooperative. HEENT:  Normal cephalic, atraumatic without obvious deformity. Pupils equal, round, and reactive to light. Extra ocular muscles intact. Conjunctivae/corneas clear. Neck: Supple, with full range of motion. No jugular venous distention. Trachea midline. Respiratory:  Normal respiratory effort. Clear to auscultation, bilaterally without Rales/Wheezes/Rhonchi. Cardiovascular:  Regular rate and rhythm with normal S1/S2 without murmurs, rubs or gallops. Abdomen: Soft, tender to palpation, non-distended with hyporactive bowel sounds. Musculoskeletal:  No clubbing, cyanosis or edema bilaterally. Skin: Pale skin, texture, turgor normal.  No rashes or lesions. Scattered ecchymoses. Neurologic:  Neurovascularly intact.  Cranial nerves: II-XII intact, grossly non-focal.  Psychiatric:  Alert and oriented, thought content appropriate, normal insight  Capillary Refill: Brisk,3 seconds, normal  Peripheral Pulses: +2 palpable, equal bilaterally        Data    Recent Labs     10/18/21  1109 10/19/21  0532   WBC 11.1* 10.3   HGB 13.9 13.3   HCT 40.2 38.5    127*      Recent Labs     10/18/21  1109 10/19/21  0532    138   K 3.7 3.6   CL 99 104   CO2 26 23   BUN 21* 10   CREATININE 0.9 0.6     Recent Labs     10/18/21  1109   AST 84*   ALT 72*   BILITOT 0.6   ALKPHOS 110     No results for input(s): INR in the last 72 hours. Recent Labs     10/18/21  1109 10/19/21  0023 10/19/21  0532   TROPONINI 0.02* 0.01 0.01       Consults:     IP CONSULT TO HOSPITALIST    ASSESSMENT AND PLAN      Active Hospital Problems    Diagnosis Date Noted    HTN (hypertension) [I10]      Priority: High    Hypothyroidism [E03.9]      Priority: High    Hyperlipidemia [E78.5] 01/13/2014     Priority: Medium    Orthostatic lightheadedness [R42] 10/18/2021    UTI (urinary tract infection) [N39.0] 10/18/2021     PLAN:     Recurrent orthostatic dizziness in setting of diarrhea, poor po intake, and acute cystitis  -hemodynamically stable at this time  -echo on 10/15 with EF 55%-60%  -likely 2/2  possibly worsened from isovolumetric contraction  - abx : CTX  -gentle IVF overnight  -up as tolerated  -no indication for further cardiac testing per results from 10/15/21  -Meds: Remove Oxybutinin home med for contribution to orthostasis.    -CONSTANTIN Hose to be placed on patient      Abdominal pain  -Secondary to acute gastroenteritis, Pain improving  -epigastric across right and left sides, worsened on palpation  -given clinical suspiscion for c-diff per recent abx, c-diff test ordered and pending  -contact isolation  -KUB without acute process     Elevated troponin, 0.02  -no complaint of chest pain  -EKG non-ischemic  -may be a result of recent cardiac catheterization  -trend overnight  -continue home ASA     UTI with moderate leukocyte esterase per UA  -Ruled out with negative culture  -rocephin given in Ed; Discontinued 10/20 Culture NGTD  -urine culture NGTD in 18-36 hours,   -cbc in am     Hypertension  -continue toprol     Hyperlipidemia  -continue lipitor  -lipid panel completed 10/15/2021     Hypothyroidism  -continue synthroid     Transaminitis   -appears to be chronic, though levels are slightly elevated at 72/84  -likely secondary to alcohol use; patient reports 5-6 glasses of wine per week  -hepatitis panel ordered given abdominal pain; pending    Breast Cancer to R breast 2019  -No surgical intervention  -NO XRT required. -Meds: Arimidex daily  -Follow up with Dr Dane Foster as outpt as routinely scheduled. Macrocytic Anemia  -  -Labs reviewed: 4/9/21 Folate > 20. B12 1274  -Suspect secondary to ETOH intake, patient drinks wine with meals.   -Cont to monitor as clinically warranted.      DVT Prophylaxis: Lovenox     Diet: 3 gram carbs.       Code Status: Prior     PT/OT Eval Status:Ordered     Dispo - Patient medically ready for DC. Awaiting Facility acceptance. Patient not to return to [facility] [place]. Patient desires [facility] Hollins. CM assisting patient.    Sarah Dunham MD

## 2021-10-20 NOTE — DISCHARGE INSTR - COC
Continuity of Care Form    Patient Name: Parminder Dove   :  1940  MRN:  [id number]    Admit date:  10/18/2021  Discharge date:  10/20/21    Code Status Order: Full Code   Advance Directives:      Admitting Physician:  Umer Emmanuel DO  PCP: Kim Bazan MD    Discharging Nurse: [facility]/Room#: 0202/0202-01  Discharging Unit Phone Number: [phone]    Emergency Contact:   Extended Emergency Contact Information  Primary Emergency Contact: [address] Phone: [phone]  Relation: Child  Secondary Emergency Contact: [address] [place] [address] Phone: [phone]  Mobile Phone: [phone]  Relation: Child  Preferred language: English   needed?  No    Past Surgical History:  Past Surgical History:   Procedure Laterality Date    APPENDECTOMY      BREAST SURGERY      CATARACT REMOVAL WITH IMPLANT Left [id number]    CHOLECYSTECTOMY  1970    EPIDURAL STEROID INJECTION Right 2019    RIGHT LUMBAR FIVE SACRAL ONE EPIDURAL STEROID INJECTION SITE CONFIRMED BY FLUOROSCOPY performed by Lakhwinder Chi MD at [address] [place] [address] Right     cataract     HYSTERECTOMY  1971    JOINT REPLACEMENT Left     hip    JOINT REPLACEMENT Right     hip    OTHER SURGICAL HISTORY Right 2017    LUMPECTOMY, NEEDLE LOCALIZATION AND SENTINEL NODERIGHT LUMPECTOMY, NEEDLE LOCALIZATION AND SENTINEL NODE    THYROIDECTOMY  1968    TONSILLECTOMY  194       Immunization History:   Immunization History   Administered Date(s) Administered    COVID-19, Moderna, PF, 100mcg/0.5mL 2021, 2021    Influenza 2012    Influenza Virus Vaccine 10/07/2005, 10/16/2006, 2007, 12/15/2008, 2009, 10/05/2010, 2011, 2014    Influenza, High Dose (Fluzone 65 yrs and older) 12/15/2014, 10/13/2016, 10/24/2017, 2019    Influenza, Quadv, adjuvanted, 65 yrs +, IM, PF (Fluad) 10/02/2020, 10/14/2021    Influenza, Triv, inactivated, subunit, adjuvanted, IM (Fluad 65 yrs and older) 12/11/2019    Pneumococcal Conjugate 13-valent ([id number]) 04/11/2016    Pneumococcal Conjugate 7-valent (Prevnar7) 11/01/2002    Pneumococcal Polysaccharide ([id number]) 01/13/2014    Td, unspecified formulation 11/01/1993       Active Problems:  Patient Active Problem List   Diagnosis Code    Hypothyroidism E03.9    HTN (hypertension) I10    Gout M10.9    Arthritis M19.90    Palpitations R00.2    Edema R60.9    Elevated LFTs R79.89    IFG (impaired fasting glucose) R73.01    Fatty liver K76.0    Pyelonephritis N12    Localized osteoarthrosis, lower leg M17.10    DDD (degenerative disc disease), lumbar M51.36    Hyperlipidemia E78.5    Cataract H26.9    Sacroiliitis (HCC) M46.1    Hip pain M25.559    Peripheral edema R60.9    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    Spinal stenosis of lumbar region M48.061    Thrombocytopenia (HCC) D69.6    Chest pain R07.9    NSTEMI (non-ST elevated myocardial infarction) (HCC) I21.4    Orthostatic lightheadedness R42    UTI (urinary tract infection) N39.0       Isolation/Infection:   Isolation            C Diff Contact          Patient Infection Status       Infection Onset Added Last Indicated Last Indicated By Review Planned Expiration Resolved Resolved By    C-diff Rule Out 10/18/21 10/18/21 10/18/21 C Diff Toxin/ Antigen (Ordered)        Resolved    COVID-19 Rule Out 10/14/21 10/14/21 10/14/21 COVID-19, Rapid (Ordered)   10/14/21 Rule-Out Test Resulted            Nurse Assessment:  Last Vital Signs: /82   Pulse 75   Temp 98.2 °F (36.8 °C) (Oral)   Resp 16   Ht 5' 3\" (1.6 m)   Wt 171 lb 9.6 oz (77.8 kg)   SpO2 97%   BMI 30.40 kg/m²     Last documented pain score (0-10 scale): Pain Level: 0  Last Weight:   Wt Readings from Last 1 Encounters:   10/20/21 171 lb 9.6 oz (77.8 kg)     Mental Status:  oriented, alert, coherent, logical, thought oxygen at 2 L/min per nasal cannula. Ventilator:    - BiPAP    ,   only when sleeping    Rehab Therapies: Physical Therapy and Occupational Therapy  Weight Bearing Status/Restrictions: No weight bearing restirctions  Other Medical Equipment (for information only, NOT a DME order):  walker  Other Treatments: na    Patient's personal belongings (please select all that are sent with patient):  Brenda    RN SIGNATURE:  Electronically signed by Indira Louis RN on 10/20/21 at 2:55 PM EDT    CASE MANAGEMENT/SOCIAL WORK SECTION    Inpatient Status Date: 10/18/21    Readmission Risk Assessment Score:  Readmission Risk              Risk of Unplanned Readmission:  12           Discharging to Facility/ Agency   [address] [place] [address]       Dialysis Facility (if applicable)   · Name:  · Address:  · Dialysis Schedule:  · Phone:  · Fax:    / signature: Electronically signed by Santiago Campbell RN on 10/20/21 at 10:27 AM EDT    PHYSICIAN SECTION    Prognosis: Good    Condition at Discharge: Stable    Rehab Potential (if transferring to Rehab): Good    Recommended Labs or Other Treatments After Discharge: CONSTANTIN hose placed daily, Encourage to avoid dehydration,   Follow your blood pressure. Physician Certification: I certify the above information and transfer of Delmer Gonzalez  is necessary for the continuing treatment of the diagnosis listed and that she requires [facility] for greater 30 days.      Update Admission H&P: No change in H&P    PHYSICIAN SIGNATURE:  Electronically signed by Acacia Mendoza MD on 10/20/21 at 11:58 AM EDT

## 2021-10-21 NOTE — DISCHARGE SUMMARY
Hospital Medicine Discharge Summary    Patient ID: Parminder Dove      Patient's PCP: Kim Bazan MD    Admit Date: 10/18/2021     Discharge Date: 10/20/2021      Admitting Physician: Umer Emmanuel DO     Discharge Physician: Loreto Dinh MD     Discharge Diagnoses: Active Hospital Problems    Diagnosis     HTN (hypertension) [I10]      Priority: High    Hypothyroidism [E03.9]      Priority: High    Hyperlipidemia [E78.5]      Priority: Medium    Orthostatic lightheadedness [R42]     UTI (urinary tract infection) [N39.0]        The patient was seen and examined on day of discharge and this discharge summary is in conjunction with any daily progress note from day of discharge. Hospital Course:   Rebeca Gilman is a pleasant 78 y.o female with PMH of HTN, HLD, and hypothyroidism who presented to [facility] with complaint of orthostatic dizziness. History obtained from patient as well as review of the EMR. Per pt, she felt dizzy and unwell while at home today at [facility] [place]. She noted the dizziness while ambulating in the restroom to the sink. She cites that she experiences this sensation any time that she stands and claims it is at random intervals and not related to the taking of her antihypertensives. She states that during this episode she was assisted into a chair and her blood pressure was taken and found to be low, though she does not provide specific readings. She endorses a concomitant fatigue and nausea. She endorses shortness of breath that is worsened on exertion, though denies cough or congestion. She denies associated chest pain and back pain. The patient had recently been evaluated for these same symptoms from 10/14-10/17 where she underwent a cardiac catherization without intervention and echocardiogram by Dr. Alicia Thomas.  She was found to have mild CAD/ASHD and was to be medically managed, thus she was discharged. Today she additionally endorses abdominal pain and claims that she had not had a BM in approximately one week, however she was given colace, dulcolax, and MOM which had resulted in frequent diarrhea yesterday with minimal po intake. She denies any blood in her stool and denies any green discoloration, though she does describe it as \"severely odiferous. \" Given her concern for her continued symptoms, however, she opted to come into the hospital for further evaluation and thus she called EMS. Once in the ED she was noted to be afebrile and hemodynamically stable. She was found to have mild transaminitis, a mildly elevated troponin, and evidence of a UTI per UA. Rocephin was given and a urine culture was obtained and sent. She was also noted to have orthostatic changes in her vital signs and thus she was given 2 liters of normal saline. Given her dizziness and tachycardia a CTPE was ordered but demonstrated no evidence of a PE. Orthostatics were assessed afterward IVF completion and were found to continue to have positional changes, therefore the ED provider felt as if she warranted admission for further investigation into her medical issues. At the time of this assessment the patient is noted to be resting comfortably in bed and in no apparent distress. She denies additional provoking or palliative factors. She denies additional symptoms in including chest pain, back pain, shortness of breath, abdominal pain, n/v/d, numbness, tingling, fever and/or chills. She will be admitted to the hospital for continued evaluation and management of her condition.     Patient was placed to the Hospitalist service for further medical care and evaluation. Physical Exam Performed:     /75   Pulse 81   Temp 97.6 °F (36.4 °C) (Oral)   Resp 16   Ht 5' 3\" (1.6 m)   Wt 171 lb 9.6 oz (77.8 kg)   SpO2 96%   BMI 30.40 kg/m²   General appearance:  Elderly female in no apparent distress, appears stated age and cooperative. HEENT:  Normal cephalic, atraumatic without obvious deformity. Pupils equal, round, and reactive to light.  Extra ocular muscles intact. Conjunctivae/corneas clear. Neck: Supple, with full range of motion. No jugular venous distention. Trachea midline. Respiratory:  Normal respiratory effort. Clear to auscultation, bilaterally without Rales/Wheezes/Rhonchi. Cardiovascular:  Regular rate and rhythm with normal S1/S2 without murmurs, rubs or gallops. Abdomen: Soft, tender to palpation, non-distended with hyporactive bowel sounds. Musculoskeletal:  No clubbing, cyanosis or edema bilaterally. Skin: Pale skin, texture, turgor normal.  No rashes or lesions. Scattered ecchymoses. Neurologic:  Neurovascularly intact. Cranial nerves: II-XII intact, grossly non-focal.  Psychiatric:  Alert and oriented, thought content appropriate, normal insight  Capillary Refill: Brisk,3 seconds, normal  Peripheral Pulses: +2 palpable, equal bilaterally           Labs: For convenience and continuity at follow-up the following most recent labs are provided:      CBC:    Lab Results   Component Value Date    WBC 7.8 10/20/2021    HGB 12.5 10/20/2021    HCT 36.0 10/20/2021     10/20/2021       Renal:    Lab Results   Component Value Date     10/19/2021    K 3.6 10/19/2021     10/19/2021    CO2 23 10/19/2021    BUN 10 10/19/2021    CREATININE 0.6 10/19/2021    CALCIUM 9.5 10/19/2021         Significant Diagnostic Studies    Radiology:   XR ABDOMEN (KUB) (SINGLE AP VIEW)   Final Result   Mild gaseous distension of the stomach and colon. Nonobstructive bowel gas   pattern. CT CHEST PULMONARY EMBOLISM W CONTRAST   Final Result   No evidence of a pulmonary embolus. Mildly dilated and atherosclerotic thoracic aorta with no aneurysm or   dissection which is unchanged. Mild coronary artery calcifications. Suggest cardiology follow-up      Small pulmonary nodules scattered in the right lung which are unchanged.    Recommend follow-up      Mild bibasilar atelectasis or early infiltrates which is more prominent on   the left recommend follow-up with serial chest x-rays      Fleischner Society guidelines for follow-up and management of incidentally   detected pulmonary nodules      Multiple Solid Nodules:      Nodule size less than 6 mm   In a low-risk patient, no routine follow-up. In a high-risk patient, optional CT at 12 months. Nodule size equals 6-8 mm   In a low-risk patient, CT at 3-6 months, then consider CT at 18-24 months. In a high-risk patient, CT at 3-6 months, then CT at 18-24 months. Nodule size greater than 8 mm   In a low-risk patient, CT at 3-6 months, then consider CT at 18-24 months. In a high-risk patient, CT at 3-6 months, then CT at 18-24 months. - Low risk patients include individuals with minimal or absent history of   smoking and other known risk factors. - High risk patients include individuals with a history or smoking or known   risk factors. Radiology 2017 http://pubs. rsna.org/doi/full/10.1148/radiol. 8730912914         XR CHEST PORTABLE   Final Result   No acute cardiopulmonary process. ASSESSMENT AND PLAN             Active Hospital Problems     Diagnosis Date Noted    HTN (hypertension) [I10]         Priority: High    Hypothyroidism [E03.9]         Priority: High    Hyperlipidemia [E78.5] 01/13/2014       Priority: Medium    Orthostatic lightheadedness [R42] 10/18/2021    UTI (urinary tract infection) [N39.0] 10/18/2021      PLAN:     Recurrent orthostatic dizziness in setting of diarrhea, poor po intake, and acute cystitis  -Resolved  -hemodynamically stable at this time  -echo on 10/15 with EF 55%-60%  -likely 2/2  possibly worsened from isovolumetric contraction  - abx : CTX given for 2 days  -gentle IVF completed  -up as tolerated  -no indication for further cardiac testing per results from 10/15/21  -Meds: Remove Oxybutinin home med for contribution to orthostasis.    -CONSTANTIN Hose to be placed on patient R-1Normal      atorvastatin (LIPITOR) 40 MG tablet TAKE 1/2 TABLET BY MOUTH DAILY FOR HIGH CHOLESTEROL, Disp-45 tablet, R-1Normal      metoprolol succinate (TOPROL XL) 50 MG extended release tablet TAKE 1 TABLET BY MOUTH EVERY DAY FOR HIGH BLOOD PRESSURE, Disp-90 tablet, R-1Normal      Multiple Vitamins-Minerals (THERAPEUTIC MULTIVITAMIN-MINERALS) tablet Take 1 tablet by mouth dailyHistorical Med      anastrozole (ARIMIDEX) 1 MG tablet TK 1 T PO QD, R-4Historical Med             Time Spent on discharge is more than 45 mins in the examination, evaluation, counseling and review of medications and discharge plan. Signed:    Iris Gaines MD   10/20/2021      Thank you Jazmin Dukes MD for the opportunity to be involved in this patient's care. If you have any questions or concerns please feel free to contact me at [phone].

## 2021-10-24 LAB — PTH RELATED PEPTIDE: 3 PMOL/L (ref 0–3.4)

## 2021-10-28 NOTE — PROGRESS NOTES
Physician Progress Note      PATIENT:               David Ocampo  CSN #:                  [id number]  :                       1940  ADMIT DATE:       10/14/2021 4:24 PM  [address] [place] DATE:        10/17/2021 4:00 PM  RESPONDING  PROVIDER #:        Koki Weiss MD          QUERY TEXT:    Patient admitted with chest pain, elevated troponin. Documentation reflects   \"possible NSTEMI\" in cardiology consult note. If possible, please document in   the progress notes and discharge summary if NSTEMI was: The medical record reflects the following:  Risk Factors: CAD, HTN  Clinical Indicators: Trop 0.02 ->0.02->0. 01.  Cardiology consult: \"Chest pain,   elev troponin, possible nstemi,\"  Treatment: cardiology consult and LHC, ASA, statin, beta blocker, serial labs,   monitoring    Thank you,  Anne Brown RN CDS  [phone]  Options provided:  -- NSTEMI ruled out after study  -- NSTEMI confirmed after study  -- Other - I will add my own diagnosis  -- Disagree - Not applicable / Not valid  -- Disagree - Clinically unable to determine / Unknown  -- Refer to Clinical Documentation Reviewer    PROVIDER RESPONSE TEXT:    Provider disagreed with this query. Query created by:  Indira Mcgowan on 10/28/2021 2:29 PM      Electronically signed by:  Koki Weiss MD 10/28/2021 2:35 PM

## 2021-10-30 NOTE — PROGRESS NOTES
Physician Progress Note      PATIENT:               Bryanna Tello  CSN #:                  [id number]  :                       1940  ADMIT DATE:       10/14/2021 4:24 PM  [address] [place] DATE:        10/17/2021 4:00 PM  RESPONDING  PROVIDER #:        Carlos Duran MD          QUERY TEXT:    Patient admitted with chest pain, elevated troponin. Documentation reflects   \"possible NSTEMI\" in cardiology consult note. If possible, please document in   the progress notes and discharge summary if NSTEMI was: The medical record reflects the following:  Risk Factors: CAD, HTN  Clinical Indicators: Trop 0.02 ->0.02->0. 01.  Cardiology consult: \"Chest pain,   elev troponin, possible nstemi,\"  Treatment: cardiology consult and LHC, ASA, statin, beta blocker, serial labs,   monitoring    Thank you,  Nena Cardenas RN CDS  [phone]  Options provided:  -- NSTEMI ruled out after study  -- NSTEMI confirmed after study  -- Other - I will add my own diagnosis  -- Disagree - Not applicable / Not valid  -- Disagree - Clinically unable to determine / Unknown  -- Refer to Clinical Documentation Reviewer    PROVIDER RESPONSE TEXT:    NSTEMI ruled out after study. Query created by:  Keith Bhatia on 10/28/2021 2:39 PM      Electronically signed by:  Carlos Duran MD 10/30/2021 9:11 AM

## 2021-11-02 ENCOUNTER — APPOINTMENT (OUTPATIENT)
Dept: GENERAL RADIOLOGY | Age: 81
DRG: 378 | End: 2021-11-02
Payer: MEDICARE

## 2021-11-02 ENCOUNTER — TELEPHONE (OUTPATIENT)
Dept: INTERNAL MEDICINE CLINIC | Age: 81
End: 2021-11-02

## 2021-11-02 ENCOUNTER — HOSPITAL ENCOUNTER (INPATIENT)
Age: 81
LOS: 3 days | Discharge: NURSING, REHAB OR LONG-TERM CARE | DRG: 378 | End: 2021-11-05
Attending: EMERGENCY MEDICINE | Admitting: INTERNAL MEDICINE
Payer: MEDICARE

## 2021-11-02 DIAGNOSIS — K92.2 UGIB (UPPER GASTROINTESTINAL BLEED): Primary | ICD-10-CM

## 2021-11-02 DIAGNOSIS — D50.0 BLOOD LOSS ANEMIA: ICD-10-CM

## 2021-11-02 LAB
A/G RATIO: 1.5 (ref 1.1–2.2)
ABO/RH: NORMAL
ALBUMIN SERPL-MCNC: 2.9 G/DL (ref 3.4–5)
ALP BLD-CCNC: 75 U/L (ref 40–129)
ALT SERPL-CCNC: 49 U/L (ref 10–40)
ANION GAP SERPL CALCULATED.3IONS-SCNC: 14 MMOL/L (ref 3–16)
ANTIBODY SCREEN: NORMAL
APTT: 26.6 SEC (ref 26.2–38.6)
AST SERPL-CCNC: 45 U/L (ref 15–37)
BACTERIA: ABNORMAL /HPF
BILIRUB SERPL-MCNC: 0.3 MG/DL (ref 0–1)
BILIRUBIN URINE: NEGATIVE
BLOOD BANK DISPENSE STATUS: NORMAL
BLOOD BANK DISPENSE STATUS: NORMAL
BLOOD BANK PRODUCT CODE: NORMAL
BLOOD BANK PRODUCT CODE: NORMAL
BLOOD, URINE: ABNORMAL
BPU ID: NORMAL
BPU ID: NORMAL
BUN BLDV-MCNC: 50 MG/DL (ref 7–20)
CALCIUM SERPL-MCNC: 9 MG/DL (ref 8.3–10.6)
CHLORIDE BLD-SCNC: 99 MMOL/L (ref 99–110)
CLARITY: CLEAR
CO2: 24 MMOL/L (ref 21–32)
COLOR: YELLOW
CREAT SERPL-MCNC: 0.8 MG/DL (ref 0.6–1.2)
DESCRIPTION BLOOD BANK: NORMAL
DESCRIPTION BLOOD BANK: NORMAL
EPITHELIAL CELLS, UA: ABNORMAL /HPF (ref 0–5)
GFR AFRICAN AMERICAN: >60
GFR NON-AFRICAN AMERICAN: >60
GLUCOSE BLD-MCNC: 153 MG/DL (ref 70–99)
GLUCOSE URINE: NEGATIVE MG/DL
HCT VFR BLD CALC: 22.9 % (ref 36–48)
HCT VFR BLD CALC: 28 % (ref 36–48)
HEMOGLOBIN: 7.7 G/DL (ref 12–16)
HEMOGLOBIN: 9.4 G/DL (ref 12–16)
INR BLD: 1.24 (ref 0.88–1.12)
KETONES, URINE: NEGATIVE MG/DL
LEUKOCYTE ESTERASE, URINE: ABNORMAL
MAGNESIUM: 1.9 MG/DL (ref 1.8–2.4)
MICROSCOPIC EXAMINATION: YES
NITRITE, URINE: NEGATIVE
OCCULT BLOOD SCREENING: ABNORMAL
PH UA: 5.5 (ref 5–8)
POTASSIUM REFLEX MAGNESIUM: 3.5 MMOL/L (ref 3.5–5.1)
PROCALCITONIN: 0.29 NG/ML (ref 0–0.15)
PROTEIN UA: NEGATIVE MG/DL
PROTHROMBIN TIME: 14.1 SEC (ref 9.9–12.7)
RBC UA: ABNORMAL /HPF (ref 0–4)
RENAL EPITHELIAL, UA: ABNORMAL /HPF (ref 0–1)
SARS-COV-2, NAAT: NOT DETECTED
SODIUM BLD-SCNC: 137 MMOL/L (ref 136–145)
SPECIFIC GRAVITY UA: 1.02 (ref 1–1.03)
TOTAL PROTEIN: 4.9 G/DL (ref 6.4–8.2)
TROPONIN: <0.01 NG/ML
URINE REFLEX TO CULTURE: ABNORMAL
URINE TYPE: ABNORMAL
UROBILINOGEN, URINE: 0.2 E.U./DL
WBC UA: ABNORMAL /HPF (ref 0–5)

## 2021-11-02 PROCEDURE — C9113 INJ PANTOPRAZOLE SODIUM, VIA: HCPCS | Performed by: INTERNAL MEDICINE

## 2021-11-02 PROCEDURE — 87635 SARS-COV-2 COVID-19 AMP PRB: CPT

## 2021-11-02 PROCEDURE — 87086 URINE CULTURE/COLONY COUNT: CPT

## 2021-11-02 PROCEDURE — 85730 THROMBOPLASTIN TIME PARTIAL: CPT

## 2021-11-02 PROCEDURE — 2700000000 HC OXYGEN THERAPY PER DAY

## 2021-11-02 PROCEDURE — 6370000000 HC RX 637 (ALT 250 FOR IP): Performed by: INTERNAL MEDICINE

## 2021-11-02 PROCEDURE — 87186 SC STD MICRODIL/AGAR DIL: CPT

## 2021-11-02 PROCEDURE — 85025 COMPLETE CBC W/AUTO DIFF WBC: CPT

## 2021-11-02 PROCEDURE — P9016 RBC LEUKOCYTES REDUCED: HCPCS

## 2021-11-02 PROCEDURE — 86850 RBC ANTIBODY SCREEN: CPT

## 2021-11-02 PROCEDURE — 6360000002 HC RX W HCPCS: Performed by: INTERNAL MEDICINE

## 2021-11-02 PROCEDURE — 80053 COMPREHEN METABOLIC PANEL: CPT

## 2021-11-02 PROCEDURE — 82270 OCCULT BLOOD FECES: CPT

## 2021-11-02 PROCEDURE — 36415 COLL VENOUS BLD VENIPUNCTURE: CPT

## 2021-11-02 PROCEDURE — 85610 PROTHROMBIN TIME: CPT

## 2021-11-02 PROCEDURE — 86900 BLOOD TYPING SEROLOGIC ABO: CPT

## 2021-11-02 PROCEDURE — 86901 BLOOD TYPING SEROLOGIC RH(D): CPT

## 2021-11-02 PROCEDURE — 94761 N-INVAS EAR/PLS OXIMETRY MLT: CPT

## 2021-11-02 PROCEDURE — 85014 HEMATOCRIT: CPT

## 2021-11-02 PROCEDURE — 84145 PROCALCITONIN (PCT): CPT

## 2021-11-02 PROCEDURE — C9113 INJ PANTOPRAZOLE SODIUM, VIA: HCPCS | Performed by: EMERGENCY MEDICINE

## 2021-11-02 PROCEDURE — 1200000000 HC SEMI PRIVATE

## 2021-11-02 PROCEDURE — 96374 THER/PROPH/DIAG INJ IV PUSH: CPT

## 2021-11-02 PROCEDURE — 99285 EMERGENCY DEPT VISIT HI MDM: CPT

## 2021-11-02 PROCEDURE — 86923 COMPATIBILITY TEST ELECTRIC: CPT

## 2021-11-02 PROCEDURE — 36430 TRANSFUSION BLD/BLD COMPNT: CPT

## 2021-11-02 PROCEDURE — 2580000003 HC RX 258: Performed by: INTERNAL MEDICINE

## 2021-11-02 PROCEDURE — 81001 URINALYSIS AUTO W/SCOPE: CPT

## 2021-11-02 PROCEDURE — 93005 ELECTROCARDIOGRAM TRACING: CPT | Performed by: INTERNAL MEDICINE

## 2021-11-02 PROCEDURE — 83735 ASSAY OF MAGNESIUM: CPT

## 2021-11-02 PROCEDURE — 84484 ASSAY OF TROPONIN QUANT: CPT

## 2021-11-02 PROCEDURE — 85018 HEMOGLOBIN: CPT

## 2021-11-02 PROCEDURE — 87077 CULTURE AEROBIC IDENTIFY: CPT

## 2021-11-02 PROCEDURE — 71045 X-RAY EXAM CHEST 1 VIEW: CPT

## 2021-11-02 PROCEDURE — 6360000002 HC RX W HCPCS: Performed by: EMERGENCY MEDICINE

## 2021-11-02 RX ORDER — SODIUM CHLORIDE 9 MG/ML
10 INJECTION INTRAVENOUS EVERY 12 HOURS
Status: DISCONTINUED | OUTPATIENT
Start: 2021-11-02 | End: 2021-11-04

## 2021-11-02 RX ORDER — POTASSIUM CHLORIDE 750 MG/1
10 CAPSULE, EXTENDED RELEASE ORAL 2 TIMES DAILY
COMMUNITY
End: 2021-12-02 | Stop reason: ALTCHOICE

## 2021-11-02 RX ORDER — PANTOPRAZOLE SODIUM 40 MG/10ML
80 INJECTION, POWDER, LYOPHILIZED, FOR SOLUTION INTRAVENOUS ONCE
Status: COMPLETED | OUTPATIENT
Start: 2021-11-02 | End: 2021-11-02

## 2021-11-02 RX ORDER — ATORVASTATIN CALCIUM 40 MG/1
40 TABLET, FILM COATED ORAL NIGHTLY
Status: DISCONTINUED | OUTPATIENT
Start: 2021-11-02 | End: 2021-11-05 | Stop reason: HOSPADM

## 2021-11-02 RX ORDER — LEVOTHYROXINE SODIUM 0.12 MG/1
125 TABLET ORAL DAILY
Status: DISCONTINUED | OUTPATIENT
Start: 2021-11-02 | End: 2021-11-05 | Stop reason: HOSPADM

## 2021-11-02 RX ORDER — ONDANSETRON 2 MG/ML
4 INJECTION INTRAMUSCULAR; INTRAVENOUS EVERY 6 HOURS PRN
Status: DISCONTINUED | OUTPATIENT
Start: 2021-11-02 | End: 2021-11-05 | Stop reason: HOSPADM

## 2021-11-02 RX ORDER — SODIUM CHLORIDE 9 MG/ML
INJECTION, SOLUTION INTRAVENOUS CONTINUOUS
Status: DISCONTINUED | OUTPATIENT
Start: 2021-11-02 | End: 2021-11-03

## 2021-11-02 RX ORDER — ONDANSETRON 4 MG/1
4 TABLET, ORALLY DISINTEGRATING ORAL EVERY 8 HOURS PRN
Status: DISCONTINUED | OUTPATIENT
Start: 2021-11-02 | End: 2021-11-05 | Stop reason: HOSPADM

## 2021-11-02 RX ORDER — SODIUM CHLORIDE 0.9 % (FLUSH) 0.9 %
5-40 SYRINGE (ML) INJECTION EVERY 12 HOURS SCHEDULED
Status: DISCONTINUED | OUTPATIENT
Start: 2021-11-02 | End: 2021-11-05 | Stop reason: HOSPADM

## 2021-11-02 RX ORDER — SODIUM CHLORIDE 9 MG/ML
25 INJECTION, SOLUTION INTRAVENOUS PRN
Status: DISCONTINUED | OUTPATIENT
Start: 2021-11-02 | End: 2021-11-05 | Stop reason: HOSPADM

## 2021-11-02 RX ORDER — SODIUM CHLORIDE 0.9 % (FLUSH) 0.9 %
5-40 SYRINGE (ML) INJECTION PRN
Status: DISCONTINUED | OUTPATIENT
Start: 2021-11-02 | End: 2021-11-05 | Stop reason: HOSPADM

## 2021-11-02 RX ORDER — SODIUM CHLORIDE 9 MG/ML
INJECTION, SOLUTION INTRAVENOUS PRN
Status: DISCONTINUED | OUTPATIENT
Start: 2021-11-02 | End: 2021-11-03

## 2021-11-02 RX ORDER — ANASTROZOLE 1 MG/1
1 TABLET ORAL DAILY
Status: DISCONTINUED | OUTPATIENT
Start: 2021-11-02 | End: 2021-11-05 | Stop reason: HOSPADM

## 2021-11-02 RX ORDER — ACETAMINOPHEN 650 MG/1
650 SUPPOSITORY RECTAL EVERY 6 HOURS PRN
Status: DISCONTINUED | OUTPATIENT
Start: 2021-11-02 | End: 2021-11-05 | Stop reason: HOSPADM

## 2021-11-02 RX ORDER — FUROSEMIDE 20 MG/1
20 TABLET ORAL DAILY
COMMUNITY
End: 2021-11-16

## 2021-11-02 RX ORDER — PANTOPRAZOLE SODIUM 40 MG/10ML
40 INJECTION, POWDER, LYOPHILIZED, FOR SOLUTION INTRAVENOUS EVERY 12 HOURS
Status: DISCONTINUED | OUTPATIENT
Start: 2021-11-02 | End: 2021-11-04

## 2021-11-02 RX ORDER — ACETAMINOPHEN 325 MG/1
650 TABLET ORAL EVERY 6 HOURS PRN
Status: DISCONTINUED | OUTPATIENT
Start: 2021-11-02 | End: 2021-11-05 | Stop reason: HOSPADM

## 2021-11-02 RX ADMIN — PANTOPRAZOLE SODIUM 40 MG: 40 INJECTION, POWDER, FOR SOLUTION INTRAVENOUS at 21:30

## 2021-11-02 RX ADMIN — Medication 10 ML: at 21:31

## 2021-11-02 RX ADMIN — SODIUM CHLORIDE: 9 INJECTION, SOLUTION INTRAVENOUS at 21:27

## 2021-11-02 RX ADMIN — PANTOPRAZOLE SODIUM 80 MG: 40 INJECTION, POWDER, FOR SOLUTION INTRAVENOUS at 14:16

## 2021-11-02 RX ADMIN — CEFTRIAXONE SODIUM 1000 MG: 1 INJECTION, POWDER, FOR SOLUTION INTRAMUSCULAR; INTRAVENOUS at 21:29

## 2021-11-02 ASSESSMENT — PAIN SCALES - GENERAL
PAINLEVEL_OUTOF10: 0
PAINLEVEL_OUTOF10: 6
PAINLEVEL_OUTOF10: 6

## 2021-11-02 NOTE — ED NOTES
Consent for blood products signed after reviewing risks and benefits with pt     Aide Dinh RN  11/02/21 5502

## 2021-11-02 NOTE — PROGRESS NOTES
.4 Eyes Skin Assessment     The patient is being assess for  Admission    I agree that 2 RN's have performed a thorough Head to Toe Skin Assessment on the patient. ALL assessment sites listed below have been assessed. Areas assessed by both nurses: Sujatha GUTIERREZ and Carmita  [x]   Head, Face, and Ears   [x]   Shoulders, Back, and Chest  [x]   Arms, Elbows, and Hands   [x]   Coccyx, Sacrum, and IschIum  [x]   Legs, Feet, and Heels        Does the Patient have Skin Breakdown?   No         Harry Prevention initiated:  No   Wound Care Orders initiated:  No      [facility] nurse consulted for Pressure Injury (Stage 3,4, Unstageable, DTI, NWPT, and Complex wounds), New and Established Ostomies:  No      Nurse 1 eSignature: Electronically signed by Colt Blackwood RN on 11/2/21 at 7:11 PM EDT    **SHARE this note so that the co-signing nurse is able to place an eSignature**    Nurse 2 eSignature: Electronically signed by Akua Ruff RN on 11/3/21 at 6:05 PM EDT

## 2021-11-02 NOTE — PROGRESS NOTES
Pt arrived to c3 and orientated to room and surrounding. Call light within reach and bed alarm on. Sanjeev Jason RN

## 2021-11-02 NOTE — H&P
Hospital Medicine History & Physical      PCP: Bayron Daily MD    Date of Admission: 11/2/2021    Date of Service: Pt seen/examined on 2021-11-02 and Admitted to Inpatient with expected LOS greater than two midnights due to medical therapy. Chief Complaint: Melena      History Of Present Illness:      78 y.o. female who presented to [facility] with above complaint. She was recently discharged after being treated for UTI. Currently she is at rehabilitation. Yesterday after therapy he vomited the spaghetti that she had for lunch. He did not feel that great last night. She felt so weak and dizzy with activity. She noticed tarry loose stool last night. Today she noticed there is some blood in the stool. She does have some shortness of breath. Denies chest pain syncope change in mental status, fever, cough, hematemesis, hematuria or epistaxis.     Past Medical History:          Diagnosis Date    [facility]     Cancer ([facility] 09/2017    right breast cancer status post lumpectomy    Edema     Fatty liver     elevated LFT's    Gout     HIGH CHOLESTEROL     HTN (hypertension)     Hypothyroidism     IFG (impaired fasting glucose)     Osteoarthritis     Palpitations     Pyelonephritis 03       Past Surgical History:          Procedure Laterality Date    APPENDECTOMY  1954    BREAST SURGERY      CATARACT REMOVAL WITH IMPLANT Left [id number]    CHOLECYSTECTOMY  12/1970    EPIDURAL STEROID INJECTION Right 11/12/2019    RIGHT LUMBAR FIVE SACRAL ONE EPIDURAL STEROID INJECTION SITE CONFIRMED BY FLUOROSCOPY performed by Lindsey Barahona MD at [address] [facility] Right 2007    cataract     HYSTERECTOMY  5/1971    JOINT REPLACEMENT Left 2001    hip    JOINT REPLACEMENT Right 2002    hip    OTHER SURGICAL HISTORY Right 09/27/2017    LUMPECTOMY, NEEDLE LOCALIZATION AND SENTINEL NODERIGHT LUMPECTOMY, NEEDLE LOCALIZATION AND SENTINEL NODE   [address] Medications Prior to Admission:      Prior to Admission medications    Medication Sig Start Date End Date Taking? Authorizing Provider   furosemide (LASIX) 20 MG tablet Take 20 mg by mouth 2 times daily   Yes Historical Provider, MD   potassium chloride (MICRO-K) 10 MEQ extended release capsule Take 10 mEq by mouth 2 times daily   Yes Historical Provider, MD   cloNIDine (CATAPRES) 0.1 MG tablet TAKE 1/2 TABLET BY MOUTH DAILY FOR HIGH BLOOD PRESSURE 1/21/21  Yes Historical Provider, MD   aspirin 81 MG chewable tablet Take 1 tablet by mouth daily 10/18/21  Yes Lenore Damon MD   levothyroxine (SYNTHROID) 125 MCG tablet TAKE 1 TABLET BY MOUTH DAILY FOR THYROID 10/13/21  Yes LONG Blair - CNP   atorvastatin (LIPITOR) 40 MG tablet TAKE 1/2 TABLET BY MOUTH DAILY FOR HIGH CHOLESTEROL 9/7/21  Yes Haleigh Pitts MD   metoprolol succinate (TOPROL XL) 50 MG extended release tablet TAKE 1 TABLET BY MOUTH EVERY DAY FOR HIGH BLOOD PRESSURE 5/13/21  Yes Haleigh Pitts MD   Multiple Vitamins-Minerals (THERAPEUTIC MULTIVITAMIN-MINERALS) tablet Take 1 tablet by mouth daily   Yes Historical Provider, MD   anastrozole (ARIMIDEX) 1 MG tablet TK 1 T PO QD 2/9/18  Yes Historical Provider, MD       Allergies:  Lisinopril and Vasotec    Social History:      The patient currently lives at skilled nursing facility    TOBACCO:   reports that she has never smoked. She has never used smokeless tobacco.  ETOH:   reports current alcohol use of about 5.0 - 6.0 standard drinks of alcohol per week. E-Cigarettes/Vaping Use     Questions Responses    E-Cigarette/Vaping Use     Start Date     Passive Exposure     Quit Date     Counseling Given     Comments             Family History:     Reviewed in detail and negative for DM, CAD, Cancer, CVA.  Positive as follows:        Problem Relation Age of Onset    Park Cancer Mother         breast / liver CA    Other Mother         pernicious anemia    Cancer Brother         prostate cancer    Alcohol Abuse Sister        REVIEW OF SYSTEMS COMPLETED:   Pertinent positives as noted in the HPI. All other systems reviewed and negative. PHYSICAL EXAM PERFORMED:    /78   Pulse 86   Temp 98.5 °F (36.9 °C) (Oral)   Resp 20   Ht 5' 3\" (1.6 m)   Wt 172 lb 5 oz (78.2 kg)   SpO2 100%   BMI 30.52 kg/m²     General appearance:  No apparent distress, appears stated age and cooperative. HEENT:  Normal cephalic, atraumatic without obvious deformity. Extra ocular muscles intact. Conjunctivae/corneas clear. Very pale  Neck: Supple, with full range of motion. No jugular venous distention. Trachea midline. Respiratory:  Normal respiratory effort. Reduced air entry, bilaterally without Rales/Wheezes/Rhonchi. Cardiovascular:  Regular rate and rhythm with normal S1/S2 without murmurs, rubs or gallops. Abdomen: Soft, non-tender, non-distended with normal bowel sounds. Musculoskeletal:  No clubbing, cyanosis , has 2+ edema bilaterally. Full range of motion without deformity. Skin: Skin color, texture, turgor normal.  No rashes or lesions. Neurologic:  Neurovascularly intact without any focal sensory/motor deficits. Psychiatric:  Alert and oriented,   Capillary Refill: Brisk,3 seconds, normal  Peripheral Pulses: +2 palpable, equal bilaterally       Labs:     Recent Labs     11/02/21  1210 11/02/21  1833   WBC 20.6*  --    HGB 5.7* 7.7*   HCT 17.0* 22.9*     --      Recent Labs     11/02/21  1210      K 3.5   CL 99   CO2 24   BUN 50*   CREATININE 0.8   CALCIUM 9.0     Recent Labs     11/02/21  1210   AST 45*   ALT 49*   BILITOT 0.3   ALKPHOS 75     Recent Labs     11/02/21  1233   INR 1.24*     No results for input(s): Mike Nikitaearle in the last 72 hours.     Urinalysis:      Lab Results   Component Value Date    NITRU Negative 11/02/2021    WBCUA 6-9 11/02/2021    BACTERIA Rare 11/02/2021    RBCUA 5-10 11/02/2021    BLOODU TRACE-INTACT 11/02/2021    SPECGRAV 1.020 11/02/2021    GLUCOSEU Negative 11/02/2021       Radiology:     CXR: I have reviewed the CXR with the following interpretation:   EKG:  I have reviewed the EKG with the following interpretation: Not available    XR CHEST PORTABLE   Final Result   Limited exam.      No radiographic evidence of acute pulmonary disease given the limitations. Stool guaiac positive  ASSESSMENT:    Active Hospital Problems    Diagnosis Date Noted    GIB (gastrointestinal bleeding) [K92.2] 11/02/2021         PLAN:    Severe anemia secondary to acute GI bleed-admit, PRBC started in the emergency room, continue with 2 units, monitor H&H every 6 hours, GI evaluation, IV Protonix hold antiplatelets, Lasix clonidine metoprolol because of soft blood pressure    Shortness of breath/-possibly secondary to above-she was placed on 2 L oxygen chest x-ray no evidence of cardiopulmonary pathology-  ekg / trop pending     Low-grade fever, elevated procalcitonin and WBC/abnormal UA-possible UTI-follow-up urine culture, start on Rocephin, repeat CBC in the morning    Chronic diastolic CHF-Lasix on hold, consider resuming Lasix if blood pressure tolerates  As needed Lasix with blood transfusion    Elevated BUN-secondary to GI bleed    Chronically elevated liver function test-outpatient follow-up    Given loose stools C. difficile sample was sent from [id number]      DVT Prophylaxis: SCD  Diet: Diet NPO Exceptions are: Ice Chips  Code Status: Full Code    PT/OT Eval Status:     Dispo -admitted to [facility] 5 telemetry       Doc Ricketts MD    Thank you Bayron Daily MD for the opportunity to be involved in this patient's care. If you have any questions or concerns please feel free to contact me at [phone].

## 2021-11-02 NOTE — ED PROVIDER NOTES
HISTORY       Social History     Socioeconomic History    Marital status:      Spouse name: None    Number of children: None    Years of education: None    Highest education level: None   Occupational History    None   Tobacco Use    Smoking status: Never Smoker    Smokeless tobacco: Never Used   Substance and Sexual Activity    Alcohol use: Yes     Alcohol/week: 5.0 - 6.0 standard drinks     Types: 5 - 6 Glasses of wine per week     Comment: per week    Drug use: No    Sexual activity: Yes     Partners: Male   Other Topics Concern    None   Social History Narrative    None     Social Determinants of Health     Financial Resource Strain: Low Risk     Difficulty of Paying Living Expenses: Not hard at all   Food Insecurity: No Food Insecurity    Worried About Running Out of Food in the Last Year: Never true    Marixa of Food in the Last Year: Never true   Transportation Needs:     Lack of Transportation (Medical):  Lack of Transportation (Non-Medical):    Physical Activity:     Days of Exercise per Week:     Minutes of Exercise per Session:    Stress:     Feeling of Stress :    Social Connections:     Frequency of Communication with Friends and Family:     Frequency of Social Gatherings with Friends and Family:     Attends Tenriism Services:     Active Member of Clubs or Organizations:     Attends Club or Organization Meetings:     Marital Status:    Intimate Partner Violence:     Fear of Current or Ex-Partner:     Emotionally Abused:     Physically Abused:     Sexually Abused:        SCREENINGS               PHYSICAL EXAM    (up to 7 for level 4, 8 or more for level 5)     ED Triage Vitals [11/02/21 1203]   BP Temp Temp src Pulse Resp SpO2 Height Weight   (!) 108/51 99.4 °F (37.4 °C) -- 88 20 100 % 5' 3\" (1.6 m) 180 lb (81.6 kg)       Physical Exam    General appearance:  Cooperative. Pale in appearance but overall in no acute distress. Skin:  Warm. Dry.    Eye: Extraocular movements intact. Pale conjunctiva  Ears, nose, mouth and throat:  Oral mucosa moist,  Neck:  Trachea midline. Heart:  Regular rate and rhythm  Perfusion:  intact  Respiratory:  Lungs clear to auscultation bilaterally. Respirations nonlabored. Abdominal:   Non distended. Nontender  Rectal exam: Dark tarry stool with faint maroon tinge  Neurological:  Alert and oriented x 3.   Moves all extremities spontaneously  Musculoskeletal:   Normal ROM, no deformities          Psychiatric:  Normal mood      DIAGNOSTIC RESULTS       Labs Reviewed   CBC WITH AUTO DIFFERENTIAL - Abnormal; Notable for the following components:       Result Value    WBC 20.6 (*)     RBC 1.51 (*)     Hemoglobin 5.7 (*)     Hematocrit 17.0 (*)     .7 (*)     MCH 37.7 (*)     Neutrophils Absolute 15.1 (*)     Monocytes Absolute 1.5 (*)     All other components within normal limits    Narrative:     Regina Vásquez tel. [phone],  Hematology results called to and read back by Erlinda Obrien RN, 11/02/2021  12:51, by [facility]  Performed at:  [address], [facility] [employer]   Phone [phone]   COMPREHENSIVE METABOLIC PANEL W/ REFLEX TO MG FOR LOW K - Abnormal; Notable for the following components:    Glucose 153 (*)     BUN 50 (*)     Total Protein 4.9 (*)     Albumin 2.9 (*)     ALT 49 (*)     AST 45 (*)     All other components within normal limits    Narrative:     Performed at:  [facility]) - [place] [address] [employer]   Phone [phone] - Abnormal; Notable for the following components:    Protime 14.1 (*)     INR 1.24 (*)     All other components within normal limits    Narrative:     Performed at:  [facility]) [address], [facility] [employer]   Phone [phone]   C DIFF TOXIN/ANTIGEN   APTT    Narrative:     Performed at:  [facility]  [address] [place] [address], [facility] [place]note 2[facility] [employer]   Phone [phone]   MAGNESIUM    Narrative:     Performed at:  [facility]) [address], [place] [employer]   Phone [phone]   BLOOD OCCULT STOOL SCREEN #1   TYPE AND SCREEN    Narrative:     Performed at:  [facility]) [address], [facility] [place]note 2[facility]   Phone [phone]   PREPARE RBC (CROSSMATCH)       Interpretation per the Radiologist below, if obtained/available at the time of this note:    No orders to display       All other labs/imaging were within normal range or not returned as of this dictation. EMERGENCY DEPARTMENT COURSE and DIFFERENTIAL DIAGNOSIS/MDM:   Vitals:    Vitals:    11/02/21 1203 11/02/21 1253 11/02/21 1418   BP: (!) 108/51 (!) 119/54 (!) 104/47   Pulse: 88 87 93   Resp: 20 12 18   Temp: 99.4 °F (37.4 °C)  99 °F (37.2 °C)   TempSrc:   Oral   SpO2: 100%  100%   Weight: 180 lb (81.6 kg)     Height: 5' 3\" (1.6 m)         Patient presented to the emergency department today complaining of weakness fatigue and dark stool. Quite pale on exam.  She has no abdominal pain and her abdomen is otherwise quite soft. She does take aspirin and meloxicam as possible risk factors for upper GI bleed but also has a history of diverticulosis. Found to have significant leukocytosis of 20.6 however no signs of infection at present. She reports some diarrhea and C. difficile studies were ordered but she is yet to provide a sample. Hemoglobin ultimately resulted at 5.7 which is a significant fall from 10 days ago at 12.5. She does have an elevated BUN at present. Dark stool on exam.  Overall concerning for upper GI bleed perhaps secondary to NSAID or aspirin use. Transfused 1 unit of PRBCs and given a bolus of Protonix. GI consulted and the patient will be admitted to the hospital for further work-up.     MDM    CONSULTS     IP CONSULT TO GI  IP CONSULT TO HOSPITALIST    Critical Care:     CRITICAL CARE TIME   Total Critical Care time was 30 minutes, excluding separately reportable procedures. There was a high probability of clinically significant/life threatening deterioration in the patient's condition which required my urgent intervention. This time was spent reviewing the patient chart, interpreting diagnostic/laboratory data, transfusion of blood products for acute upper GI bleed with acute blood loss anemia      REASSESSMENT          PROCEDURE     Unless otherwise noted below, none     Procedures      FINAL IMPRESSION      1. UGIB (upper gastrointestinal bleed)    2. Blood loss anemia            DISPOSITION/PLAN   DISPOSITION Decision To Admit 11/02/2021 02:24:20 PM        PATIENT REFERRED TO:  No follow-up provider specified. DISCHARGE MEDICATIONS:  New Prescriptions    No medications on file     Controlled Substances Monitoring:     No flowsheet data found.     (Please note that portions of this note were completed with a voice recognition program.  Efforts were made to edit the dictations but occasionally words are mis-transcribed.)    Yoselin Millard MD (electronically signed)  Attending Emergency Physician            Jose Daniel Ling MD  11/02/21 7373

## 2021-11-02 NOTE — ED NOTES
Bed: 24  Expected date:   Expected time:   Means of arrival:   Comments:  Luz Mendiola RN  11/02/21 1155

## 2021-11-02 NOTE — TELEPHONE ENCOUNTER
FYI:  Patient will be discharged from the [facility] [place] on 11/4. She will need to be followed for skilled nursing and physical therapy. I verbally authorized this and orders will follow.

## 2021-11-03 ENCOUNTER — ANESTHESIA EVENT (OUTPATIENT)
Dept: ENDOSCOPY | Age: 81
DRG: 378 | End: 2021-11-03
Payer: MEDICARE

## 2021-11-03 LAB
ANION GAP SERPL CALCULATED.3IONS-SCNC: 9 MMOL/L (ref 3–16)
ANISOCYTOSIS: ABNORMAL
BANDED NEUTROPHILS RELATIVE PERCENT: 1 % (ref 0–7)
BASOPHILS ABSOLUTE: 0.1 K/UL (ref 0–0.2)
BASOPHILS ABSOLUTE: 0.2 K/UL (ref 0–0.2)
BASOPHILS RELATIVE PERCENT: 0.4 %
BASOPHILS RELATIVE PERCENT: 1 %
BUN BLDV-MCNC: 46 MG/DL (ref 7–20)
CALCIUM SERPL-MCNC: 8.6 MG/DL (ref 8.3–10.6)
CHLORIDE BLD-SCNC: 104 MMOL/L (ref 99–110)
CO2: 27 MMOL/L (ref 21–32)
CREAT SERPL-MCNC: 0.7 MG/DL (ref 0.6–1.2)
EKG ATRIAL RATE: 81 BPM
EKG DIAGNOSIS: NORMAL
EKG P AXIS: 4 DEGREES
EKG P-R INTERVAL: 140 MS
EKG Q-T INTERVAL: 376 MS
EKG QRS DURATION: 82 MS
EKG QTC CALCULATION (BAZETT): 436 MS
EKG R AXIS: 3 DEGREES
EKG T AXIS: 43 DEGREES
EKG VENTRICULAR RATE: 81 BPM
EOSINOPHILS ABSOLUTE: 0 K/UL (ref 0–0.6)
EOSINOPHILS ABSOLUTE: 0.4 K/UL (ref 0–0.6)
EOSINOPHILS RELATIVE PERCENT: 0.1 %
EOSINOPHILS RELATIVE PERCENT: 2 %
GFR AFRICAN AMERICAN: >60
GFR NON-AFRICAN AMERICAN: >60
GLUCOSE BLD-MCNC: 114 MG/DL (ref 70–99)
HCT VFR BLD CALC: 17 % (ref 36–48)
HCT VFR BLD CALC: 25.4 % (ref 36–48)
HCT VFR BLD CALC: 26.2 % (ref 36–48)
HCT VFR BLD CALC: 26.3 % (ref 36–48)
HCT VFR BLD CALC: 27.4 % (ref 36–48)
HEMATOLOGY PATH CONSULT: NO
HEMOGLOBIN: 5.7 G/DL (ref 12–16)
HEMOGLOBIN: 8.6 G/DL (ref 12–16)
HEMOGLOBIN: 8.8 G/DL (ref 12–16)
HEMOGLOBIN: 8.9 G/DL (ref 12–16)
HEMOGLOBIN: 9.1 G/DL (ref 12–16)
IRON SATURATION: 25 % (ref 15–50)
IRON: 66 UG/DL (ref 37–145)
LYMPHOCYTES ABSOLUTE: 2.8 K/UL (ref 1–5.1)
LYMPHOCYTES ABSOLUTE: 3.9 K/UL (ref 1–5.1)
LYMPHOCYTES RELATIVE PERCENT: 15 %
LYMPHOCYTES RELATIVE PERCENT: 18.8 %
MACROCYTES: ABNORMAL
MAGNESIUM: 2 MG/DL (ref 1.8–2.4)
MCH RBC QN AUTO: 33.2 PG (ref 26–34)
MCH RBC QN AUTO: 37.7 PG (ref 26–34)
MCHC RBC AUTO-ENTMCNC: 33.4 G/DL (ref 31–36)
MCHC RBC AUTO-ENTMCNC: 34.1 G/DL (ref 31–36)
MCV RBC AUTO: 112.7 FL (ref 80–100)
MCV RBC AUTO: 97.5 FL (ref 80–100)
METAMYELOCYTES RELATIVE PERCENT: 1 %
MONOCYTES ABSOLUTE: 1.1 K/UL (ref 0–1.3)
MONOCYTES ABSOLUTE: 1.5 K/UL (ref 0–1.3)
MONOCYTES RELATIVE PERCENT: 6 %
MONOCYTES RELATIVE PERCENT: 7.3 %
NEUTROPHILS ABSOLUTE: 14 K/UL (ref 1.7–7.7)
NEUTROPHILS ABSOLUTE: 15.1 K/UL (ref 1.7–7.7)
NEUTROPHILS RELATIVE PERCENT: 73.4 %
NEUTROPHILS RELATIVE PERCENT: 74 %
NUCLEATED RED BLOOD CELLS: 9 /100 WBC
PDW BLD-RTO: 14 % (ref 12.4–15.4)
PDW BLD-RTO: 21.4 % (ref 12.4–15.4)
PLATELET # BLD: 166 K/UL (ref 135–450)
PLATELET # BLD: 219 K/UL (ref 135–450)
PMV BLD AUTO: 7.6 FL (ref 5–10.5)
PMV BLD AUTO: 8.3 FL (ref 5–10.5)
POLYCHROMASIA: ABNORMAL
POTASSIUM REFLEX MAGNESIUM: 3.3 MMOL/L (ref 3.5–5.1)
RBC # BLD: 1.51 M/UL (ref 4–5.2)
RBC # BLD: 2.68 M/UL (ref 4–5.2)
SODIUM BLD-SCNC: 140 MMOL/L (ref 136–145)
TARGET CELLS: ABNORMAL
TOTAL IRON BINDING CAPACITY: 262 UG/DL (ref 260–445)
WBC # BLD: 18.4 K/UL (ref 4–11)
WBC # BLD: 20.6 K/UL (ref 4–11)

## 2021-11-03 PROCEDURE — 80048 BASIC METABOLIC PNL TOTAL CA: CPT

## 2021-11-03 PROCEDURE — 97530 THERAPEUTIC ACTIVITIES: CPT

## 2021-11-03 PROCEDURE — 36415 COLL VENOUS BLD VENIPUNCTURE: CPT

## 2021-11-03 PROCEDURE — 85025 COMPLETE CBC W/AUTO DIFF WBC: CPT

## 2021-11-03 PROCEDURE — 6360000002 HC RX W HCPCS: Performed by: INTERNAL MEDICINE

## 2021-11-03 PROCEDURE — 97535 SELF CARE MNGMENT TRAINING: CPT

## 2021-11-03 PROCEDURE — 97116 GAIT TRAINING THERAPY: CPT

## 2021-11-03 PROCEDURE — 6370000000 HC RX 637 (ALT 250 FOR IP): Performed by: INTERNAL MEDICINE

## 2021-11-03 PROCEDURE — 83735 ASSAY OF MAGNESIUM: CPT

## 2021-11-03 PROCEDURE — 1200000000 HC SEMI PRIVATE

## 2021-11-03 PROCEDURE — 93010 ELECTROCARDIOGRAM REPORT: CPT | Performed by: INTERNAL MEDICINE

## 2021-11-03 PROCEDURE — 85014 HEMATOCRIT: CPT

## 2021-11-03 PROCEDURE — 83540 ASSAY OF IRON: CPT

## 2021-11-03 PROCEDURE — 97166 OT EVAL MOD COMPLEX 45 MIN: CPT

## 2021-11-03 PROCEDURE — 85018 HEMOGLOBIN: CPT

## 2021-11-03 PROCEDURE — C9113 INJ PANTOPRAZOLE SODIUM, VIA: HCPCS | Performed by: INTERNAL MEDICINE

## 2021-11-03 PROCEDURE — 97162 PT EVAL MOD COMPLEX 30 MIN: CPT

## 2021-11-03 PROCEDURE — 2580000003 HC RX 258: Performed by: INTERNAL MEDICINE

## 2021-11-03 PROCEDURE — 97110 THERAPEUTIC EXERCISES: CPT

## 2021-11-03 PROCEDURE — 6370000000 HC RX 637 (ALT 250 FOR IP): Performed by: REGISTERED NURSE

## 2021-11-03 PROCEDURE — 83550 IRON BINDING TEST: CPT

## 2021-11-03 RX ORDER — POTASSIUM CHLORIDE 20 MEQ/1
40 TABLET, EXTENDED RELEASE ORAL ONCE
Status: COMPLETED | OUTPATIENT
Start: 2021-11-03 | End: 2021-11-03

## 2021-11-03 RX ADMIN — PANTOPRAZOLE SODIUM 40 MG: 40 INJECTION, POWDER, FOR SOLUTION INTRAVENOUS at 08:39

## 2021-11-03 RX ADMIN — Medication 10 ML: at 20:08

## 2021-11-03 RX ADMIN — PANTOPRAZOLE SODIUM 40 MG: 40 INJECTION, POWDER, FOR SOLUTION INTRAVENOUS at 20:07

## 2021-11-03 RX ADMIN — CEFTRIAXONE SODIUM 1000 MG: 1 INJECTION, POWDER, FOR SOLUTION INTRAMUSCULAR; INTRAVENOUS at 20:10

## 2021-11-03 RX ADMIN — POTASSIUM CHLORIDE 40 MEQ: 20 TABLET, EXTENDED RELEASE ORAL at 13:47

## 2021-11-03 ASSESSMENT — PAIN SCALES - GENERAL
PAINLEVEL_OUTOF10: 5
PAINLEVEL_OUTOF10: 5
PAINLEVEL_OUTOF10: 6
PAINLEVEL_OUTOF10: 0
PAINLEVEL_OUTOF10: 5

## 2021-11-03 NOTE — PROGRESS NOTES
Perfect Serve message to Chante Grayson CNP:    EKG complete in EPIC. Per order, MD to be notified upon completion.        Electronically signed by Ottoniel Rhoades RN on 11/2/2021 at 9:04 PM

## 2021-11-03 NOTE — PROGRESS NOTES
Occupational Therapy   Occupational Therapy Initial Assessment and treatment    Date: 11/3/2021   Patient Name: Glendy Thomas  MRN: [id number]     : 1940    Date of Service: 11/3/2021    Discharge Recommendations:  Subacute/Skilled Nursing Facility       Assessment   Performance deficits / Impairments: Decreased functional mobility ; Decreased ADL status; Decreased strength;Decreased safe awareness;Decreased cognition;Decreased endurance;Decreased balance  Assessment: Pt admitted for melena, GI bleed. Pt reports no desire \"to do much of anything lately\". Pt needs encouragement for all mvmt and activity, decreased awareness of deficits with decreased initiation for tasks. Pt currently requiring extensive assist for ADLs and 2 person A for bed mobility and transfers. Recommend SNF at discharge. Prognosis: Fair  OT Education: OT Role;Plan of Care;ADL Adaptive Strategies;Transfer Training  Patient Education: disease specific: ADLs, transfers, importnace of mobility/doing for self to decrease effects of bedrest  Barriers to Learning: cognition  REQUIRES OT FOLLOW UP: Yes  Activity Tolerance  Activity Tolerance: Patient limited by fatigue;Treatment limited secondary to medical complications (free text)  Activity Tolerance: supine 131/73 HR 79, /55, standing 91/63 HR 92, O2 sats >90% on RA  Safety Devices  Safety Devices in place: Yes  Type of devices: Gait belt;Call light within reach (left EOB with PT)           Patient Diagnosis(es): The primary encounter diagnosis was UGIB (upper gastrointestinal bleed). A diagnosis of Blood loss anemia was also pertinent to this visit. has a past medical history of Breast cancer ([facility] [address] Cancer ([facility] [address] Edema, Fatty liver, Gout, HIGH CHOLESTEROL, HTN (hypertension), Hypothyroidism, IFG (impaired fasting glucose), Osteoarthritis, Palpitations, and Pyelonephritis. has a past surgical history that includes Thyroidectomy (); Tonsillectomy ();  Appendectomy (); Cholecystectomy (12/1970); Hysterectomy (5/1971); eye surgery (Right, 2007); joint replacement (Left, 2001); joint replacement (Right, 2002); Cataract removal with implant (Left, [id number]); other surgical history (Right, 09/27/2017); epidural steroid injection (Right, 11/12/2019); and Breast surgery.            Restrictions  Position Activity Restriction  Other position/activity restrictions: up with assist    Subjective   General  Chart Reviewed: Yes  Patient assessed for rehabilitation services?: Yes  Family / Caregiver Present: No  Referring Practitioner: Ministerio Zaman NP  Diagnosis: melena, GI bleed  Subjective  Subjective: Pt supine, agreeable  General Comment  Comments: RN clears for therapy  Vital Signs  Temp: 98 °F (36.7 °C)  Temp Source: Oral  Pulse: 79  Heart Rate Source: Monitor  Resp: 18  BP: 131/73  BP Location: Right upper arm  Patient Position: Semi fowlers  Oxygen Therapy  SpO2: 99 %  O2 Device: None (Room air)  Social/Functional History  Social/Functional History  Lives With: Alone  Type of Home: House  Home Layout: Two level, 1/2 bath on main level, Bed/Bath upstairs  Home Access: Stairs to enter with rails  Entrance Stairs - Number of Steps: 1 MEERA from garage, full flight to 2nd floor  Bathroom Shower/Tub: Walk-in shower  Bathroom Equipment: Grab bars in shower  Home Equipment: Quad cane, Rolling walker, Standard walker, Lift chair, Reacher, Sock aid, Leg  (transport chair)  ADL Assistance: Independent  Homemaking Assistance: Independent  Homemaking Responsibilities: Yes  Ambulation Assistance: Independent  Transfer Assistance: Independent  Active : Yes  Occupation: Retired  Type of occupation: primary nursing teacher  Leisure & Hobbies: garden, read, go out for lunch       Objective   Vision: Impaired  Vision Exceptions: Wears glasses for reading (reading and driving)  Hearing: Within functional limits    Orientation  Overall Orientation Status: Within Functional Limits Balance  Sitting Balance: Supervision  Standing Balance: Minimal assistance  Standing Balance  Time: < 1 min  Activity: bedside standing to take BP  Comment: RW and min  A x 2  ADL  Feeding: NPO  Toileting: Dependent/Total (pure wick)  Tone RUE  RUE Tone: Normotonic  Tone LUE  LUE Tone: Normotonic  Coordination  Movements Are Fluid And Coordinated: Yes     Bed mobility  Supine to Sit: 2 Person assistance;Maximum assistance  Transfers  Sit to stand: Moderate assistance;2 Person assistance;Minimal assistance  Stand to sit: Moderate assistance;2 Person assistance;Minimal assistance  Transfer Comments: min/mod A x 2 for sit<>stand from EOB     Cognition  Overall Cognitive Status: Exceptions  Arousal/Alertness: Delayed responses to stimuli  Following Commands: Follows one step commands with increased time; Follows one step commands with repetition  Attention Span: Attends with cues to redirect  Safety Judgement: Decreased awareness of need for safety  Problem Solving: Decreased awareness of errors  Insights: Decreased awareness of deficits  Initiation: Requires cues for some  Cognition Comment: pt with poor insight into deficits and reports minimal desire to \"do much of anything\"        Sensation  Overall Sensation Status: Impaired (neuropathy in B bottoms of feet)        LUE AROM (degrees)  LUE AROM : WFL  Left Hand AROM (degrees)  Left Hand AROM: WFL  RUE AROM (degrees)  RUE AROM : WFL  Right Hand AROM (degrees)  Right Hand AROM: WFL  LUE Strength  Gross LUE Strength: WFL  RUE Strength  Gross RUE Strength: WFL        Plan   Plan  Times per week: 3-5x/wk    AM-PAC Score        AM-[facility] Inpatient Daily Activity Raw Score: 13 (11/03/21 1259)  AM-PAC Inpatient ADL T-Scale Score : 32.03 (11/03/21 1259)  ADL Inpatient CMS 0-100% Score: 63.03 (11/03/21 1259)  ADL Inpatient CMS G-Code Modifier : CL (11/03/21 1259)    Goals  Short term goals  Time Frame for Short term goals: 1 week by 11/10  Short term goal 1: Pt will complete functional transfers with Min A or less  Short term goal 2: Pt will complete LB ADLs with mod A or less  Short term goal 3: Pt will complete toileting with CGA  Short term goal 4: Pt will tolerate B UE ex x 20 reps w/o fatigue  Patient Goals   Patient goals : \"get strength back\"       Therapy Time   Individual Concurrent Group Co-treatment   Time In 1024         Time Out 1059         Minutes 35         Timed Code Treatment Minutes: 25 Minutes (10 min eval)      If pt discharges prior to next session, this note will serve as discharge summary. See case management note for discharge disposition.      Bandar Burgos, OT

## 2021-11-03 NOTE — CONSULTS
Gastroenterology Consult Note    Patient:   Anmol Duran   YOB: 1940   Facility:   [facility]   Referring/PCP: Alida Potter MD  Date:     11/3/2021  Consultant:   Anderson Steele MD, MD    Subjective: This 78 y.o. female was admitted 11/2/2021 with a diagnosis of \"GIB (gastrointestinal bleeding) [K92.2]  Blood loss anemia [D50.0]  UGIB (upper gastrointestinal bleed) [K92.2]\" and is seen in consultation regarding \"melena\". Information was obtained from interview of  the patient, examination of the patient, and review of records. I did  update the past medical, surgical, social and / or family history. Melena for days moderate in GI tract assoc w anemia    Current status  Present  Diet Order: Diet NPO Exceptions are: Ice Chips and she is tolerating diet. Recently, she has experienced no abdominal  Pain and she has not required Intravenous narcotic analgesics. The patient has also experienced no constipation, diarrhea, fever, hematochezia, nausea and vomiting      Prior to Admission medications    Medication Sig Start Date End Date Taking?  Authorizing Provider   furosemide (LASIX) 20 MG tablet Take 20 mg by mouth daily    Yes Historical Provider, MD   potassium chloride (MICRO-K) 10 MEQ extended release capsule Take 10 mEq by mouth 2 times daily   Yes Historical Provider, MD   benzocaine (ORAJEL) 20 % GEL mucosal gel Take by mouth 2 times daily as needed for Pain   Yes Historical Provider, MD   cloNIDine (CATAPRES) 0.1 MG tablet TAKE 1/2 TABLET BY MOUTH DAILY FOR HIGH BLOOD PRESSURE 1/21/21  Yes Historical Provider, MD   aspirin 81 MG chewable tablet Take 1 tablet by mouth daily 10/18/21  Yes Ginette Biswas MD   levothyroxine (SYNTHROID) 125 MCG tablet TAKE 1 TABLET BY MOUTH DAILY FOR THYROID 10/13/21  Yes LONG Ramirez CNP   atorvastatin (LIPITOR) 40 MG tablet TAKE 1/2 TABLET BY MOUTH DAILY FOR HIGH CHOLESTEROL 9/7/21  Yes Cyril Pitts MD   metoprolol succinate (TOPROL XL) 50 MG extended release tablet TAKE 1 TABLET BY MOUTH EVERY DAY FOR HIGH BLOOD PRESSURE 5/13/21  Yes Lorna Pitts MD   Multiple Vitamins-Minerals (THERAPEUTIC MULTIVITAMIN-MINERALS) tablet Take 1 tablet by mouth daily   Yes Historical Provider, MD   anastrozole (ARIMIDEX) 1 MG tablet TK 1 T PO QD 2/9/18  Yes Historical Provider, MD      Scheduled Medications:    anastrozole  1 mg Oral Daily    atorvastatin  40 mg Oral Nightly    levothyroxine  125 mcg Oral Daily    sodium chloride flush  5-40 mL IntraVENous 2 times per day    pantoprazole  40 mg IntraVENous Q12H    And    sodium chloride (PF)  10 mL IntraVENous Q12H    cefTRIAXone (ROCEPHIN) IV  1,000 mg IntraVENous Q24H     Infusions:    sodium chloride      sodium chloride      sodium chloride 50 mL/hr at 11/02/21 2127    sodium chloride       PRN Medications: sodium chloride, sodium chloride flush, sodium chloride, ondansetron **OR** ondansetron, acetaminophen **OR** acetaminophen, sodium chloride  Allergies: Allergies   Allergen Reactions    Lisinopril      Cough.  Vasotec      Cough.          Past Medical History:   Diagnosis Date    [facility]     Cancer ([facility] 09/2017    right breast cancer status post lumpectomy    Edema     Fatty liver     elevated LFT's    Gout     HIGH CHOLESTEROL     HTN (hypertension)     Hypothyroidism     IFG (impaired fasting glucose)     Osteoarthritis     Palpitations     Pyelonephritis 03     Past Surgical History:   Procedure Laterality Date    APPENDECTOMY  1954    BREAST SURGERY      CATARACT REMOVAL WITH IMPLANT Left [id number]    CHOLECYSTECTOMY  12/1970    EPIDURAL STEROID INJECTION Right 11/12/2019    RIGHT LUMBAR FIVE SACRAL ONE EPIDURAL STEROID INJECTION SITE CONFIRMED BY FLUOROSCOPY performed by Verenice Leggett MD at [address] [place] [address] Right 2007    cataract     HYSTERECTOMY  5/1971    JOINT REPLACEMENT Left 2001    hip    JOINT REPLACEMENT Right     hip    OTHER SURGICAL HISTORY Right 2017    LUMPECTOMY, NEEDLE LOCALIZATION AND SENTINEL NODERIGHT LUMPECTOMY, NEEDLE LOCALIZATION AND SENTINEL NODE    THYROIDECTOMY  1968    TONSILLECTOMY  1945       Social:   Social History     Tobacco Use    Smoking status: Never Smoker    Smokeless tobacco: Never Used   Substance Use Topics    Alcohol use: Yes     Alcohol/week: 5.0 - 6.0 standard drinks     Types: 5 - 6 Glasses of wine per week     Comment: per week     Family:   Family History   Problem Relation Age of Onset   [place] Cancer Mother         breast / liver CA    Other Mother         pernicious anemia    Cancer Brother         prostate cancer    Alcohol Abuse Sister        ROS: Pertinent items are noted in HPI.     Objective:   Vital Signs:  Temp (24hrs), Av.7 °F (37.1 °C), Min:97.9 °F (36.6 °C), Max:99.8 °F (25.2 °C)     Systolic (96OWT), UNK:630 , Min:68 , DGY:296      Diastolic (22CXV), POR:28, Min:37, Max:78     Pulse  Av  Min: 76  Max: 107  /65   Pulse 76   Temp 98 °F (36.7 °C) (Axillary)   Resp 18   Ht 5' 3\" (1.6 m)   Wt 172 lb 5 oz (78.2 kg)   SpO2 100%   BMI 30.52 kg/m²      Physical Exam:   /65   Pulse 76   Temp 98 °F (36.7 °C) (Axillary)   Resp 18   Ht 5' 3\" (1.6 m)   Wt 172 lb 5 oz (78.2 kg)   SpO2 100%   BMI 30.52 kg/m²   General appearance: alert, appears stated age and cooperative  Lungs: clear to auscultation bilaterally  Chest wall: no tenderness  Heart: regular rate and rhythm, S1, S2 normal, no murmur, click, rub or gallop  Abdomen: soft, non-tender; bowel sounds normal; no masses,  no organomegaly  Extremities: extremities normal, atraumatic, no cyanosis or edema  Skin: Skin color, texture, turgor normal. No rashes or lesions  Neurologic: Grossly normal    Lab and Imaging Review   Recent Labs     21  1210 21  1233 21  1833 21  2210 21  0311 21  0633 21  0634   WBC 20.6*  --   --   --   --  18.4* --    HGB 5.7*  --  7.7* 9.4* 9.1* 8.9*  --    .7*  --   --   --   --  97.5  --      --   --   --   --  166  --    INR  --  1.24*  --   --   --   --   --      --   --   --   --   --  140   K 3.5  --   --   --   --   --  3.3*   CL 99  --   --   --   --   --  104   CO2 24  --   --   --   --   --  27   BUN 50*  --   --   --   --   --  46*   CREATININE 0.8  --   --   --   --   --  0.7   GLUCOSE 153*  --   --   --   --   --  114*   CALCIUM 9.0  --   --   --   --   --  8.6   PROT 4.9*  --   --   --   --   --   --    LABALBU 2.9*  --   --   --   --   --   --    AST 45*  --   --   --   --   --   --    ALT 49*  --   --   --   --   --   --    ALKPHOS 75  --   --   --   --   --   --    BILITOT 0.3  --   --   --   --   --   --    MG 1.90  --   --   --   --   --  2.00       Assessment:     Patient Active Problem List    Diagnosis Date Noted    Hypothyroidism     HTN (hypertension)     Hyperlipidemia 01/13/2014    Localized osteoarthrosis, lower leg 11/18/2013    DDD (degenerative disc disease), lumbar 11/18/2013    Elevated LFTs     IFG (impaired fasting glucose)     Pyelonephritis     Gout     Arthritis     Palpitations     Edema     Fatty liver     GIB (gastrointestinal bleeding) 11/02/2021    Orthostatic lightheadedness 10/18/2021    UTI (urinary tract infection) 10/18/2021    NSTEMI (non-ST elevated myocardial infarction) (HCC)     Thrombocytopenia ([facility] 10/14/2021    Chest pain 10/14/2021    History of bilateral total hip arthroplasty 10/14/2019    Spinal stenosis of lumbar region 10/14/2019    Peripheral edema 07/02/2018    Sacroiliitis ([facility] 08/06/2015    Hip pain 08/06/2015    Cataract 12/15/2014     77 yo w HTN, arthritis, CAD admitted for melena and profound anemia on ASA and Meloxicam. H/H 5.7/17. Plan:   1. Supportive care and transfuse as appropriate  2. IV PPI  3. F/U H/H  4.  EGD in am    Charlette Mauricio MD       [phone]) [phone]

## 2021-11-03 NOTE — ACP (ADVANCE CARE PLANNING)
Advance Care Planning     General Advance Care Planning (ACP) Conversation    Date of Conversation: 11/2/2021  Conducted with: Patient with [address]: Named in Advance Directive or Healthcare Power of  (name) Yaa García Decision Maker:    Primary Decision Maker (Active): Lorin Pike - [phone]    Secondary Decision Maker: Lillie  - Child - [phone]  Click here to complete Healthcare Decision Makers including selection of the Healthcare Decision Maker Relationship (ie \"Primary\"). Today we documented Decision Maker(s). The patient will provide ACP documents. Content/Action Overview:   Has ACP document(s) NOT on file - requested patient to provide  Reviewed DNR/DNI and patient elects Full Code (Attempt Resuscitation)    Length of Voluntary ACP Conversation in minutes:  <16 minutes (Non-Billable)    TED Malloy

## 2021-11-03 NOTE — PROGRESS NOTES
Comprehensive Nutrition Assessment    Type and Reason for Visit:  Initial, Positive Nutrition Screen    Nutrition Recommendations/Plan:   1. Continue full liquid diet and ADAT per MD  2. RD to add ensure compact TID and magic cup BID   3. Monitor nutrition adequacy, pertinent labs, bowel habits, wt changes, and clinical progress    Nutrition Assessment:  Positive nutrition screen for poor intake/ wt loss: Admitted due to acute blood loss anemia due to suspected upper GI bleed. On full liquid diet. NPO after midnight for EGD tomorrow. Pt reports good appetite, able to consume 50-76% of lunch tray. Reports poor appetite/intake x 2 months PTA after back surgery. Emesis reported PTA. Reports 10 lb wt loss x 2 months. No post-prandial N/V reported. Willing to trial magic cup BID and ensure compact TID. Will continue to monitor. Malnutrition Assessment:  Malnutrition Status: At risk for malnutrition (Comment)    Context:  Acute Illness     Findings of the 6 clinical characteristics of malnutrition:  Energy Intake:  Mild decrease in energy intake (Comment)  Body Fat Loss:  1 - Mild body fat loss Triceps   Fluid Accumulation:  1 - Mild      Estimated Daily Nutrient Needs:  Energy (kcal):  9088-9485 kcals/day; Weight Used for Energy Requirements:  Ideal (52 kg)     Protein (g):  52-62 g/day; Weight Used for Protein Requirements:  Ideal (1-1.2 g/kg)        Method Used for Fluid Requirements:  1 ml/kcal      Nutrition Related Findings:  K+ low 3.3. + BM monday, black tarry with blood. Testing for c diff. +2 RLE and LLE edema. Wounds:  Pressure Injury (Venous ulcer on leg right)       Current Nutrition Therapies:    ADULT DIET; Full Liquid  Diet NPO Exceptions are: Ice Chips    Anthropometric Measures:  · Height: 5' 3\" (160 cm)  · Current Body Weight: 172 lb (78 kg)    · Ideal Body Weight: 115 lbs; % Ideal Body Weight 149.6 %   · BMI: 30.5  · BMI Categories: Obese Class 1 (BMI 30.0-34. 9)       Nutrition Diagnosis: · Inadequate oral intake related to early satiety, pain as evidenced by poor intake prior to admission, intake 26-50%, weight loss, nausea, vomiting    Nutrition Interventions:   Food and/or Nutrient Delivery:  Continue Current Diet, Start Oral Nutrition Supplement  Nutrition Education/Counseling:  Education not indicated   Coordination of Nutrition Care:  Continue to monitor while inpatient    Goals: Tolerate diet advancement and consume 50% or greater of 3 meals per day and ONS during admission       Nutrition Monitoring and Evaluation:   Behavioral-Environmental Outcomes:  None Identified   Food/Nutrient Intake Outcomes:  Food and Nutrient Intake, Supplement Intake, Diet Advancement/Tolerance  Physical Signs/Symptoms Outcomes:  Biochemical Data, GI Status, Nutrition Focused Physical Findings, Weight     Discharge Planning:     Too soon to determine     Electronically signed by Fareed Bonner MS, RD, LD on 11/3/21 at 1:40 PM EDT    Contact: Office: [phone]; [address]: [phone]

## 2021-11-03 NOTE — PLAN OF CARE
Nutrition Problem #1: Inadequate oral intake  Intervention: Food and/or Nutrient Delivery: Continue Current Diet, Start Oral Nutrition Supplement  Nutritional Goals:  Tolerate diet advancement and consume 50% or greater of 3 meals per day and ONS during admission

## 2021-11-03 NOTE — DISCHARGE INSTR - COC
(Fluad) 10/02/2020, 10/14/2021    Influenza, Triv, inactivated, subunit, adjuvanted, IM (Fluad 65 yrs and older) 12/11/2019    Pneumococcal Conjugate 13-valent (Crfcsmw24) 04/11/2016    Pneumococcal Conjugate 7-valent (Prevnar7) 11/01/2002    Pneumococcal Polysaccharide ([id number]) 01/13/2014    Td, unspecified formulation 11/01/1993       Active Problems:  Patient Active Problem List   Diagnosis Code    Hypothyroidism E03.9    HTN (hypertension) I10    Gout M10.9    Arthritis M19.90    Palpitations R00.2    Edema R60.9    Elevated LFTs R79.89    IFG (impaired fasting glucose) R73.01    Fatty liver K76.0    Pyelonephritis N12    Localized osteoarthrosis, lower leg M17.10    DDD (degenerative disc disease), lumbar M51.36    Hyperlipidemia E78.5    Cataract H26.9    Sacroiliitis (HCC) M46.1    Hip pain M25.559    Peripheral edema R60.9    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    Spinal stenosis of lumbar region M48.061    Thrombocytopenia (HCC) D69.6    Chest pain R07.9    NSTEMI (non-ST elevated myocardial infarction) (HCC) I21.4    Orthostatic lightheadedness R42    UTI (urinary tract infection) N39.0    GIB (gastrointestinal bleeding) K92.2       Isolation/Infection:   Isolation            C Diff Contact          Patient Infection Status       Infection Onset Added Last Indicated Last Indicated By Review Planned Expiration Resolved Resolved By    C-diff Rule Out 10/18/21 10/18/21 11/02/21 Clostridium difficile toxin/antigen (Ordered)        Resolved    COVID-19 Rule Out 11/02/21 11/02/21 11/02/21 SARS-CoV-2 NAAT (Rapid) (Ordered)   11/02/21 Rule-Out Test Resulted    COVID-19 Rule Out 10/14/21 10/14/21 10/14/21 COVID-19, Rapid (Ordered)   10/14/21 Rule-Out Test Resulted            Nurse Assessment:  Last Vital Signs: /73   Pulse 79   Temp 98 °F (36.7 °C) (Oral)   Resp 18   Ht 5' 3\" (1.6 m)   Wt 172 lb 5 oz (78.2 kg)   SpO2 99%   BMI 30.52 kg/m²     Last documented pain score (0-10 scale): Pain Level: 0  Last Weight:   Wt Readings from Last 1 Encounters:   11/02/21 172 lb 5 oz (78.2 kg)     Mental Status:  oriented and alert    IV Access:  - None    Nursing Mobility/ADLs:  Walking   Assisted  Transfer  Assisted  Bathing  Assisted  Dressing  Assisted  Toileting  Assisted  Feeding  Independent  Med [address] [place] [address] Delivery   whole    Wound Care Documentation and Therapy:  Wound 10/14/21 Leg Right;Distal;Medial Bruising/Kelvin - sensitivity when touched. (Active)   Wound Etiology Venous 10/20/21 0819   Dressing/Treatment Open to air 10/20/21 0819   Wound Assessment Pink/red 11/02/21 1806   Drainage Amount None 11/02/21 1806   Odor None 11/02/21 1806   Mela-wound Assessment Intact 10/18/21 2328   Number of days: 19       Wound 10/14/21 Leg Left;Lateral Bruising/Kelvin - sensitivity when touched. (Active)   Wound Etiology Venous 10/20/21 0819   Dressing/Treatment Open to air 10/20/21 0819   Wound Assessment Pink/red 11/02/21 1806   Drainage Amount None 10/18/21 2328   Odor None 11/02/21 1806   Mela-wound Assessment Intact 10/18/21 2328   Number of days: 19        Elimination:  Continence:   · Bowel: Yes  · Bladder: using external catheter  Urinary Catheter: None   Colostomy/Ileostomy/Ileal Conduit: No       Date of Last BM: 11/3/21    Intake/Output Summary (Last 24 hours) at 11/3/2021 1532  Last data filed at 11/3/2021 1521  Gross per 24 hour   Intake 880.41 ml   Output 700 ml   Net 180.41 ml     I/O last 3 completed shifts: In: 880.4 [I.V.:10; Blood:870.4]  Out: 400 [Urine:400]    Safety Concerns: At Risk for Falls    Impairments/Disabilities:      None    Nutrition Therapy:  Current Nutrition Therapy:   - Oral Diet:  General    Routes of Feeding: Oral  Liquids:  Thin Liquids  Daily Fluid Restriction: no  Last Modified Barium Swallow with Video (Video Swallowing Test): not done    Treatments at the Time of Hospital Discharge:   Respiratory Treatments: n/a  Oxygen Therapy:  2L as needed for comfort  Ventilator:    - No ventilator support    Rehab Therapies: Physical Therapy and Occupational Therapy  Weight Bearing Status/Restrictions: No weight bearing restirctions  Other Medical Equipment (for information only, NOT a DME order):  walker, bath bench and bedside commode  Other Treatments: n/a    Patient's personal belongings (please select all that are sent with patient):  None    RN SIGNATURE:  Electronically signed by Josefa Davila RN on 11/5/21 at 5:44 PM EDT    CASE MANAGEMENT/SOCIAL WORK SECTION    Inpatient Status Date: 11/02/2021    Readmission Risk Assessment Score:  Readmission Risk              Risk of Unplanned Readmission:  17           Discharging to Facility/ Agency   [address]     / signature: Electronically signed by TED Grullon on 11/5/21 at 1:55 PM EDT    PHYSICIAN SECTION    Prognosis: Fair    Condition at Discharge: Stable    Rehab Potential (if transferring to Rehab): Fair    Recommended Labs or Other Treatments After Discharge: PT/OT, CBC daily for 4 days starting 11/6/21. Pt needs to have outpt colonoscopy +/- capsule endoscopy with Dr. Yany Leo in the the next week for so. Physician Certification: I certify the above information and transfer of Rolando Davis  is necessary for the continuing treatment of the diagnosis listed and that she requires [facility] for less 30 days.      Update Admission H&P: No change in H&P    PHYSICIAN SIGNATURE:  Electronically signed by LONG Pascal CNP on 11/5/21 at 12:21 PM EDT

## 2021-11-03 NOTE — FLOWSHEET NOTE
Responded to consult to bring Communion to pt. Pt said she will like to receive Holy Communion daily after receiving today.  will follow up as requested.

## 2021-11-03 NOTE — CARE COORDINATION
Consult received: from the [address]    Reviewed chart and completed assessment with: Patient dtalvaro Ford Early via phone   Explained Case Management role/services. Primary contact information: [facility] Decision Maker :   Primary Decision Maker (Active): Zoë Mcpherson - [phone]    Secondary Decision Maker: Antonio Bradley - Johnny - [phone]          Can this person be reached and be able to respond quickly, such as within a few minutes or hours? Yes    Admit date/status:11/028/2021 Inpatient   Diagnosis:GIB    Is this a Readmission?:  Yes  10/18-10/20 Dizziness     Insurance:Medicare   Precert required for SNF: No       3 night stay required: No    Living arrangements, Adls, care needs, prior to admission: Patient is currently skilled at The [address] [place] [address] to return. Prior home alone in two story home      Transportation: will need transport      [employer] at home:  Walker_x_Cane_x_RTS_x_ BSC__Shower Chair_x_  02__ HHN__ CPAP__  BiPap__  Hospital Bed_x_ W/C_x__ Other__________    Services in the home and/or outpatient, prior to admission: Skilled at The [place] [id number] Notification (HEN): NA    Barriers to discharge: will need rapid within 72 hrs to return     Plan/comments: Patient completing [address] rehab stay at [facility], plans to return, when medically appropriate. TED Angel    ECOC on chart for MD signature

## 2021-11-03 NOTE — PROGRESS NOTES
Pt axo. Assessment completed as charted. Pt c/o weakness, fatigue and sob. o2 sats 100% on room air, lung sounds clear bilaterally. Denies additional needs at this time. Will continue to monitor. Call light in reach.

## 2021-11-03 NOTE — PROGRESS NOTES
Physical Therapy    Facility/Department: [facility] C3 TELE/MED SURG/ONC  Initial Assessment and treatment    NAME: Jim Molina  : 1940  MRN: [id number]    Date of Service: 11/3/2021    Discharge Recommendations:  Subacute/Skilled Nursing Facility   PT Equipment Recommendations  Equipment Needed: No (defer)    Assessment   Body structures, Functions, Activity limitations: Decreased functional mobility ; Decreased sensation; Vestibular Impairment;Decreased balance  Assessment: Pt referred fo rPT evaluation during current hospital stay with a diagnosis of GI bleed. Pt is currently functioning below baseline, is demonstrating orthostatic hypotension limiting mobility. Pt required max A x2 for bed moiblity and max A for sit<>stand up to walker from EOB. Mobility limited due to drop in BP upon standing on 2 occasions. Pt would benefit from skillled acute PT to address deficits. Recommend SNF at [facility] setting. Treatment Diagnosis: impaired mobility  Prognosis: Good  Decision Making: Medium Complexity  PT Education: Goals;Gait Training;Disease Specific Education;PT Role;Plan of Care; Functional Mobility Training;Home Exercise Program;Transfer Training  Patient Education: pt verblized understanding of importnce of OOB activity  Barriers to Learning: no  REQUIRES PT FOLLOW UP: Yes  Activity Tolerance  Activity Tolerance: Patient Tolerated treatment well;Treatment limited secondary to medical complications (free text)  Activity Tolerance: limited by orthostatic hypotension; BP semireclined 131/73; SpO2 on RA 98%, HR 79; BP sitting /55; standing 91/63; Pt then sate EOB and performed LE ther ex, BP taken 137/78; standing after seated ther ex 92/41, assisted pt back into bed at that time       Patient Diagnosis(es): The primary encounter diagnosis was UGIB (upper gastrointestinal bleed). A diagnosis of Blood loss anemia was also pertinent to this visit.      has a past medical history of Breast cancer ([facility] [address] Cancer ([facility] [address] Edema, Fatty liver, Gout, HIGH CHOLESTEROL, HTN (hypertension), Hypothyroidism, IFG (impaired fasting glucose), Osteoarthritis, Palpitations, and Pyelonephritis. has a past surgical history that includes Thyroidectomy (1968); Tonsillectomy (1945); Appendectomy (1954); Cholecystectomy (12/1970); Hysterectomy (5/1971); eye surgery (Right, 2007); joint replacement (Left, 2001); joint replacement (Right, 2002); Cataract removal with implant (Left, [id number]); other surgical history (Right, 09/27/2017); epidural steroid injection (Right, 11/12/2019); and Breast surgery.     Restrictions  Restrictions/Precautions  Restrictions/Precautions: General Precautions  Position Activity Restriction  Other position/activity restrictions: up with assist, contact plus for C-diff  Vision/Hearing  Vision Exceptions: Wears glasses for reading (and driving)  Hearing: Within functional limits     Subjective  General  Chart Reviewed: Yes  Patient assessed for rehabilitation services?: Yes  Response To Previous Treatment: Not applicable  Family / Caregiver Present: Yes (daughter)  Referring Practitioner: Addison Green CNP  Referral Date : 11/03/21  Diagnosis: GI bleed  Follows Commands: Within Functional Limits  General Comment  Comments: Pt resting in bed upon entry, RN cleared pt for therapy  Subjective  Subjective: Pt agreeable to PT  Pain Screening  Patient Currently in Pain: Denies  Pain Assessment  Pain Assessment: 0-10  Pain Level: 0  Vital Signs  Patient Currently in Pain: Denies  Pre Treatment Pain Screening  Intervention List: Patient able to continue with treatment    Orientation  Orientation  Overall Orientation Status: Within Normal Limits  Social/Functional History  Social/Functional History  Lives With: Alone  Type of Home: House  Home Layout: Two level, 1/2 bath on main level, Bed/Bath upstairs  Home Access: Stairs to enter with rails  Entrance Stairs - Number of Steps: 1 MEERA from garage, full flight to 2nd floor  Bathroom Shower/Tub: Walk-in shower  Bathroom Equipment: Grab bars in shower  Home Equipment: Quad cane, Rolling walker, Standard walker, Lift chair, Reacher, Sock aid, Leg  (transport chair)  ADL Assistance: [address]: Independent  Homemaking Responsibilities: Yes  Ambulation Assistance: Independent  Transfer Assistance: Independent  Active : Yes  Occupation: Retired  Type of occupation: primary nursing teacher  Leisure & Hobbies: garden, read, go out for lunch         Objective          AROM RLE (degrees)  RLE AROM: WFL  AROM LLE (degrees)  LLE AROM : WFL  Strength RLE  Strength RLE: WFL  Comment: grossly 4 to 4+/5 throughout  Strength LLE  Strength LLE: WFL  Comment: grossly 4 to 4+/5 throughout     Sensation  Overall Sensation Status: Impaired (neuropathy B feet)  Bed mobility  Supine to Sit: Maximum assistance;2 Person assistance  Sit to Supine: Maximum assistance  Scooting: Minimal assistance (to scoot up in bed and to scoot to EOB)  Transfers  Sit to Stand:  Moderate Assistance;2 Person Assistance;Minimal Assistance (max A X1)  Stand to sit: Moderate Assistance  Ambulation  Ambulation?: Yes  Ambulation 1  Surface: level tile  Device: Rolling Walker  Assistance: Contact guard assistance  Quality of Gait: 3 ft laterally to HOB, mildly unsteady but no LOB  Distance: 3 ft  Comments: unable to progress ambulation due to orthostatic hypotension with accompanying dizziness     Balance  Posture: Fair  Sitting - Static: Good  Sitting - Dynamic: Good  Standing - Static: Fair  Standing - Dynamic: Fair  Exercises  Straight Leg Raise: x 10 B  Quad Sets: x 10 B  Heelslides: x 10 B  Gluteal Sets: x 10 B  Knee Long Arc Quad: 2 x 10 B  Ankle Pumps: 2 x 20 B     Plan   Plan  Times per week: 3-5  Current Treatment Recommendations: Strengthening, Home Exercise Program, ROM, Balance Training, Endurance Training, Functional Mobility Training, Transfer Training, Gait Training  Safety Devices  Type of devices: All fall risk precautions in place, Bed alarm in place, Call light within reach, Nurse notified, Gait belt, Patient at risk for falls, Left in bed      AM-PAC Score  AM-PAC Inpatient Mobility Raw Score : 11 (11/03/21 1538)  AM-PAC Inpatient T-Scale Score : 33.86 (11/03/21 1538)  Mobility Inpatient CMS 0-100% Score: 72.57 (11/03/21 1538)  Mobility Inpatient CMS G-Code Modifier : CL (11/03/21 1538)          Goals  Short term goals  Time Frame for Short term goals: 11/10/21 unless noted  Short term goal 1: Pt will perform bed mobility with Konrad by 11/9/21  Short term goal 2: Pt will perform transfers with Konrad x1  Short term goal 3: Pt will ambulate 20 ft with RW and Konrad  Patient Goals   Patient goals : \"to go back to rehab\"       Therapy Time   Individual Concurrent Group Co-treatment   Time In 1030         Time Out 1118         Minutes 48         Timed Code Treatment Minutes: 45 Minutes    If pt is discharged prior to next therapy session, this note will serve as discharge summary.     Donna Yuen, PT

## 2021-11-03 NOTE — PROGRESS NOTES
Hospitalist Progress Note      PCP: Tootie Rouse MD    Date of Admission: 11/2/2021    Chief Complaint: [address] [facility] Course: H&P reviewed      Subjective: Pt is on RA. Afebrile. VSS. No dyspnea at rest. No chest pain. No N/V/D. Pt's daughter is at bedside. Medications:  Reviewed    Infusion Medications    sodium chloride       Scheduled Medications    anastrozole  1 mg Oral Daily    atorvastatin  40 mg Oral Nightly    levothyroxine  125 mcg Oral Daily    sodium chloride flush  5-40 mL IntraVENous 2 times per day    pantoprazole  40 mg IntraVENous Q12H    And    sodium chloride (PF)  10 mL IntraVENous Q12H    cefTRIAXone (ROCEPHIN) IV  1,000 mg IntraVENous Q24H     PRN Meds: sodium chloride flush, sodium chloride, ondansetron **OR** ondansetron, acetaminophen **OR** acetaminophen      Intake/Output Summary (Last 24 hours) at 11/3/2021 1300  Last data filed at 11/3/2021 0424  Gross per 24 hour   Intake 880.41 ml   Output 400 ml   Net 480.41 ml       Physical Exam Performed:    /73   Pulse 79   Temp 98 °F (36.7 °C) (Oral)   Resp 18   Ht 5' 3\" (1.6 m)   Wt 172 lb 5 oz (78.2 kg)   SpO2 99%   BMI 30.52 kg/m²     General appearance: No apparent distress, appears stated age and cooperative. HEENT: Pupils equal, round, and reactive to light. Conjunctivae/corneas clear. Neck: Supple, with full range of motion. No jugular venous distention. Trachea midline. Respiratory:  Normal respiratory effort. Clear to auscultation, bilaterally without Rales/Wheezes/Rhonchi. Cardiovascular: Regular rate and rhythm with normal S1/S2 without murmurs, rubs or gallops. Abdomen: Soft, non-tender, non-distended with normal bowel sounds. Musculoskeletal: No clubbing, cyanosis. +BLE pitting edema. Skin: Skin color, texture, turgor normal.  No rashes or lesions. Neurologic:  Neurovascularly intact without any focal sensory/motor deficits.  Cranial nerves: II-XII intact, grossly non-focal.  Psychiatric: Alert and oriented, thought content appropriate, normal insight  Capillary Refill: Brisk,3 seconds, normal   Peripheral Pulses: +2 palpable, equal bilaterally       Labs:   Recent Labs     11/02/21  1210 11/02/21  1833 11/02/21  2210 11/03/21  0311 11/03/21  0633   WBC 20.6*  --   --   --  18.4*   HGB 5.7*   < > 9.4* 9.1* 8.9*   HCT 17.0*   < > 28.0* 27.4* 26.2*     --   --   --  166    < > = values in this interval not displayed. Recent Labs     11/02/21  1210 11/03/21  0634    140   K 3.5 3.3*   CL 99 104   CO2 24 27   BUN 50* 46*   CREATININE 0.8 0.7   CALCIUM 9.0 8.6     Recent Labs     11/02/21  1210   AST 45*   ALT 49*   BILITOT 0.3   ALKPHOS 75     Recent Labs     11/02/21  1233   INR 1.24*     Recent Labs     11/02/21 2210   TROPONINI <0.01       Urinalysis:      Lab Results   Component Value Date    NITRU Negative 11/02/2021    WBCUA 6-9 11/02/2021    BACTERIA Rare 11/02/2021    RBCUA 5-10 11/02/2021    BLOODU TRACE-INTACT 11/02/2021    SPECGRAV 1.020 11/02/2021    GLUCOSEU Negative 11/02/2021       Radiology:  XR CHEST PORTABLE   Final Result   Limited exam.      No radiographic evidence of acute pulmonary disease given the limitations. Assessment/Plan:    Active Hospital Problems    Diagnosis     GIB (gastrointestinal bleeding) [K92.2]        Acute blood loss anemia due to suspected upper GI bleed:  - Pt presented with melena. Hgb 5.7 in the ED.   - S/p 2 units of PRBC. Hgb responded appropriately, currently stable at 8.9.  - Continue to monitor serial H&Hs. Transfuse for Hgb less than 7.0.  - Continue IV PPI BID.  - GI consulted/following. Plan for EGD tomorrow. - Iron studies WNLs. Fever / leukocytosis:  - CXR without overt signs of pneumonia. - UA concerning for infection. F/u urine cx.   - Continue empiric IV rocephin.  - Continue to trend CBC and monitor fever curve. Lower extremity swelling:  - Etiology unclear, ?hypoalbuminemia as etiology.   - ECHO 10/15/21 showed normal LVEF of 55-60%, no RWMAs, normal LVDFP. - Hold off on lasix given low/normal BP. Compression stockings as tolerated. Hypokalemia:  - Monitor and replete as needed. Mag WNL. Hypertension:  - Currently orthostatic likely secondary to anemia.   - Hold her home BP meds. - Monitor BP closely. Hyperlipidemia:  - Continue statin. Hypothyroidism:  - Continue levothyroxine. Breast cancer to R breast 2019:  - No surgical intervention, no XRT required. - Continue daily Arimidex. - Continue outpt follow-up with Dr. Matt Clark. Mild LFT elevation:  - Appears to be chronic.  - Likely secondary to alcohol use, pt reports 5-6 glasses of wine per week. - Hepatitis panel was non-reactive when checked previously. Obesity:  - With Body mass index is 30.52 kg/m². Complicating assessment and treatment. Placing patient at risk for multiple co-morbidities as well as early death and contributing to the patient's presentation. Counseled on weight loss. DVT Prophylaxis: SCDs  Diet: ADULT DIET;  Full Liquid  Diet NPO Exceptions are: Ice Chips  Code Status: Full Code    PT/OT Eval Status: ordered    Dispo - 2-3 days pending clinical course     Sarah Christensen, LONG - CNP

## 2021-11-04 ENCOUNTER — ANESTHESIA (OUTPATIENT)
Dept: ENDOSCOPY | Age: 81
DRG: 378 | End: 2021-11-04
Payer: MEDICARE

## 2021-11-04 VITALS — DIASTOLIC BLOOD PRESSURE: 104 MMHG | SYSTOLIC BLOOD PRESSURE: 140 MMHG | OXYGEN SATURATION: 99 %

## 2021-11-04 LAB
ANION GAP SERPL CALCULATED.3IONS-SCNC: 10 MMOL/L (ref 3–16)
BUN BLDV-MCNC: 30 MG/DL (ref 7–20)
CALCIUM SERPL-MCNC: 8.2 MG/DL (ref 8.3–10.6)
CHLORIDE BLD-SCNC: 102 MMOL/L (ref 99–110)
CO2: 24 MMOL/L (ref 21–32)
CREAT SERPL-MCNC: 0.6 MG/DL (ref 0.6–1.2)
GFR AFRICAN AMERICAN: >60
GFR NON-AFRICAN AMERICAN: >60
GLUCOSE BLD-MCNC: 107 MG/DL (ref 70–99)
HCT VFR BLD CALC: 24.1 % (ref 36–48)
HCT VFR BLD CALC: 27.3 % (ref 36–48)
HEMOGLOBIN: 8.2 G/DL (ref 12–16)
HEMOGLOBIN: 9 G/DL (ref 12–16)
MCH RBC QN AUTO: 33.1 PG (ref 26–34)
MCHC RBC AUTO-ENTMCNC: 34 G/DL (ref 31–36)
MCV RBC AUTO: 97.4 FL (ref 80–100)
PDW BLD-RTO: 22.1 % (ref 12.4–15.4)
PLATELET # BLD: 148 K/UL (ref 135–450)
PMV BLD AUTO: 7.6 FL (ref 5–10.5)
POTASSIUM REFLEX MAGNESIUM: 3.6 MMOL/L (ref 3.5–5.1)
RBC # BLD: 2.48 M/UL (ref 4–5.2)
SODIUM BLD-SCNC: 136 MMOL/L (ref 136–145)
WBC # BLD: 14.9 K/UL (ref 4–11)

## 2021-11-04 PROCEDURE — 2500000003 HC RX 250 WO HCPCS: Performed by: NURSE ANESTHETIST, CERTIFIED REGISTERED

## 2021-11-04 PROCEDURE — 2580000003 HC RX 258: Performed by: NURSE ANESTHETIST, CERTIFIED REGISTERED

## 2021-11-04 PROCEDURE — 85014 HEMATOCRIT: CPT

## 2021-11-04 PROCEDURE — 85027 COMPLETE CBC AUTOMATED: CPT

## 2021-11-04 PROCEDURE — 6370000000 HC RX 637 (ALT 250 FOR IP): Performed by: INTERNAL MEDICINE

## 2021-11-04 PROCEDURE — 3700000000 HC ANESTHESIA ATTENDED CARE: Performed by: INTERNAL MEDICINE

## 2021-11-04 PROCEDURE — 97530 THERAPEUTIC ACTIVITIES: CPT

## 2021-11-04 PROCEDURE — 1200000000 HC SEMI PRIVATE

## 2021-11-04 PROCEDURE — 97110 THERAPEUTIC EXERCISES: CPT

## 2021-11-04 PROCEDURE — 7100000011 HC PHASE II RECOVERY - ADDTL 15 MIN: Performed by: INTERNAL MEDICINE

## 2021-11-04 PROCEDURE — 97116 GAIT TRAINING THERAPY: CPT

## 2021-11-04 PROCEDURE — 6370000000 HC RX 637 (ALT 250 FOR IP): Performed by: REGISTERED NURSE

## 2021-11-04 PROCEDURE — 80048 BASIC METABOLIC PNL TOTAL CA: CPT

## 2021-11-04 PROCEDURE — 7100000010 HC PHASE II RECOVERY - FIRST 15 MIN: Performed by: INTERNAL MEDICINE

## 2021-11-04 PROCEDURE — 3700000001 HC ADD 15 MINUTES (ANESTHESIA): Performed by: INTERNAL MEDICINE

## 2021-11-04 PROCEDURE — 6360000002 HC RX W HCPCS: Performed by: NURSE ANESTHETIST, CERTIFIED REGISTERED

## 2021-11-04 PROCEDURE — 36415 COLL VENOUS BLD VENIPUNCTURE: CPT

## 2021-11-04 PROCEDURE — 2709999900 HC NON-CHARGEABLE SUPPLY: Performed by: INTERNAL MEDICINE

## 2021-11-04 PROCEDURE — 3609017100 HC EGD: Performed by: INTERNAL MEDICINE

## 2021-11-04 PROCEDURE — 0DJ08ZZ INSPECTION OF UPPER INTESTINAL TRACT, VIA NATURAL OR ARTIFICIAL OPENING ENDOSCOPIC: ICD-10-PCS | Performed by: INTERNAL MEDICINE

## 2021-11-04 PROCEDURE — 85018 HEMOGLOBIN: CPT

## 2021-11-04 RX ORDER — OXYCODONE HYDROCHLORIDE AND ACETAMINOPHEN 5; 325 MG/1; MG/1
1 TABLET ORAL PRN
Status: DISCONTINUED | OUTPATIENT
Start: 2021-11-04 | End: 2021-11-04 | Stop reason: HOSPADM

## 2021-11-04 RX ORDER — OXYCODONE HYDROCHLORIDE AND ACETAMINOPHEN 5; 325 MG/1; MG/1
2 TABLET ORAL PRN
Status: DISCONTINUED | OUTPATIENT
Start: 2021-11-04 | End: 2021-11-04 | Stop reason: HOSPADM

## 2021-11-04 RX ORDER — DIPHENHYDRAMINE HYDROCHLORIDE 50 MG/ML
12.5 INJECTION INTRAMUSCULAR; INTRAVENOUS
Status: DISCONTINUED | OUTPATIENT
Start: 2021-11-04 | End: 2021-11-04 | Stop reason: HOSPADM

## 2021-11-04 RX ORDER — AMOXICILLIN AND CLAVULANATE POTASSIUM 500; 125 MG/1; MG/1
1 TABLET, FILM COATED ORAL EVERY 12 HOURS SCHEDULED
Status: DISCONTINUED | OUTPATIENT
Start: 2021-11-04 | End: 2021-11-05 | Stop reason: HOSPADM

## 2021-11-04 RX ORDER — ALPRAZOLAM 0.25 MG/1
0.25 TABLET ORAL 2 TIMES DAILY PRN
Status: DISCONTINUED | OUTPATIENT
Start: 2021-11-04 | End: 2021-11-05 | Stop reason: HOSPADM

## 2021-11-04 RX ORDER — SODIUM CHLORIDE, SODIUM LACTATE, POTASSIUM CHLORIDE, CALCIUM CHLORIDE 600; 310; 30; 20 MG/100ML; MG/100ML; MG/100ML; MG/100ML
INJECTION, SOLUTION INTRAVENOUS CONTINUOUS PRN
Status: DISCONTINUED | OUTPATIENT
Start: 2021-11-04 | End: 2021-11-04 | Stop reason: SDUPTHER

## 2021-11-04 RX ORDER — LABETALOL HYDROCHLORIDE 5 MG/ML
5 INJECTION, SOLUTION INTRAVENOUS EVERY 10 MIN PRN
Status: DISCONTINUED | OUTPATIENT
Start: 2021-11-04 | End: 2021-11-04 | Stop reason: HOSPADM

## 2021-11-04 RX ORDER — PROMETHAZINE HYDROCHLORIDE 25 MG/ML
6.25 INJECTION, SOLUTION INTRAMUSCULAR; INTRAVENOUS
Status: DISCONTINUED | OUTPATIENT
Start: 2021-11-04 | End: 2021-11-04 | Stop reason: HOSPADM

## 2021-11-04 RX ORDER — PANTOPRAZOLE SODIUM 40 MG/1
40 TABLET, DELAYED RELEASE ORAL
Status: DISCONTINUED | OUTPATIENT
Start: 2021-11-05 | End: 2021-11-05 | Stop reason: HOSPADM

## 2021-11-04 RX ORDER — MORPHINE SULFATE 2 MG/ML
2 INJECTION, SOLUTION INTRAMUSCULAR; INTRAVENOUS EVERY 5 MIN PRN
Status: DISCONTINUED | OUTPATIENT
Start: 2021-11-04 | End: 2021-11-04 | Stop reason: HOSPADM

## 2021-11-04 RX ORDER — MEPERIDINE HYDROCHLORIDE 50 MG/ML
12.5 INJECTION INTRAMUSCULAR; INTRAVENOUS; SUBCUTANEOUS EVERY 5 MIN PRN
Status: DISCONTINUED | OUTPATIENT
Start: 2021-11-04 | End: 2021-11-04 | Stop reason: HOSPADM

## 2021-11-04 RX ORDER — LIDOCAINE HYDROCHLORIDE 20 MG/ML
INJECTION, SOLUTION INFILTRATION; PERINEURAL PRN
Status: DISCONTINUED | OUTPATIENT
Start: 2021-11-04 | End: 2021-11-04 | Stop reason: SDUPTHER

## 2021-11-04 RX ORDER — MORPHINE SULFATE 2 MG/ML
1 INJECTION, SOLUTION INTRAMUSCULAR; INTRAVENOUS EVERY 5 MIN PRN
Status: DISCONTINUED | OUTPATIENT
Start: 2021-11-04 | End: 2021-11-04 | Stop reason: HOSPADM

## 2021-11-04 RX ORDER — ONDANSETRON 2 MG/ML
4 INJECTION INTRAMUSCULAR; INTRAVENOUS PRN
Status: DISCONTINUED | OUTPATIENT
Start: 2021-11-04 | End: 2021-11-04 | Stop reason: HOSPADM

## 2021-11-04 RX ORDER — HYDRALAZINE HYDROCHLORIDE 20 MG/ML
5 INJECTION INTRAMUSCULAR; INTRAVENOUS EVERY 10 MIN PRN
Status: DISCONTINUED | OUTPATIENT
Start: 2021-11-04 | End: 2021-11-04 | Stop reason: HOSPADM

## 2021-11-04 RX ORDER — PROPOFOL 10 MG/ML
INJECTION, EMULSION INTRAVENOUS PRN
Status: DISCONTINUED | OUTPATIENT
Start: 2021-11-04 | End: 2021-11-04 | Stop reason: SDUPTHER

## 2021-11-04 RX ADMIN — LIDOCAINE HYDROCHLORIDE 80 MG: 20 INJECTION, SOLUTION INFILTRATION; PERINEURAL at 09:48

## 2021-11-04 RX ADMIN — PROPOFOL 80 MG: 10 INJECTION, EMULSION INTRAVENOUS at 09:48

## 2021-11-04 RX ADMIN — SODIUM CHLORIDE, SODIUM LACTATE, POTASSIUM CHLORIDE, AND CALCIUM CHLORIDE: .6; .31; .03; .02 INJECTION, SOLUTION INTRAVENOUS at 09:48

## 2021-11-04 ASSESSMENT — PAIN SCALES - GENERAL
PAINLEVEL_OUTOF10: 0
PAINLEVEL_OUTOF10: 0
PAINLEVEL_OUTOF10: 3
PAINLEVEL_OUTOF10: 0
PAINLEVEL_OUTOF10: 0
PAINLEVEL_OUTOF10: 6

## 2021-11-04 NOTE — H&P
Pre-sedation Assessment    History and Physical / Pre-Sedation Assessment  Patient:  Renee Smith   :   1940     Intended Procedure: EGD      HPI: 79 yo w HTN, arthritis, CAD admitted for melena and profound anemia on ASA and Meloxicam. H/H 5..     Plan:   1. Supportive care and transfuse as appropriate  2. IV PPI  3. F/U H/H  4.  EGD     Current Facility-Administered Medications   Medication Dose Route Frequency Provider Last Rate Last Admin    meperidine (DEMEROL) injection 12.5 mg  12.5 mg IntraVENous Q5 Min PRN Terra Edwards MD        HYDROmorphone (DILAUDID) injection 0.25 mg  0.25 mg IntraVENous Q5 Min PRN Terra Edwards MD        HYDROmorphone (DILAUDID) injection 0.5 mg  0.5 mg IntraVENous Q5 Min PRKRANTHI Edwards MD        morphine (PF) injection 1 mg  1 mg IntraVENous Q5 Min PRN Terra Edwards MD        morphine (PF) injection 2 mg  2 mg IntraVENous Q5 Min PRKRANTHI Edwards MD        oxyCODONE-acetaminophen (PERCOCET) 5-325 MG per tablet 1 tablet  1 tablet Oral PRN Terra Edwards MD        Or    oxyCODONE-acetaminophen (PERCOCET) 5-325 MG per tablet 2 tablet  2 tablet Oral PRN Terra Edwards MD        ondansetron [facility]) injection 4 mg  4 mg IntraVENous PRN Terra Edwards MD        promethazine (PHENERGAN) injection 6.25 mg  6.25 mg IntraVENous Q15 Min PRN Terra Edwards MD        diphenhydrAMINE (BENADRYL) injection 12.5 mg  12.5 mg IntraVENous Once PRN Terra Edwards MD        labetalol (NORMODYNE;TRANDATE) injection 5 mg  5 mg IntraVENous Q10 Min PRKRANTHI Edwards MD        hydrALAZINE (APRESOLINE) injection 5 mg  5 mg IntraVENous Q10 Min PRN Terra Edwards MD        anastrozole (ARIMIDEX) tablet 1 mg  1 mg Oral Daily Luís Hinds MD   1 mg at 21    atorvastatin (LIPITOR) tablet 40 mg  40 mg Oral Nightly Luís Hinds MD   40 mg at 11/03/21 2007    levothyroxine (SYNTHROID) tablet 125 mcg  125 mcg Oral Daily Luís Hinds MD   125 mcg at 21 4991    sodium chloride flush 0.9 % injection 5-40 mL  5-40 mL IntraVENous 2 times per day Luís Hinds MD   10 mL at 11/03/21 2008    sodium chloride flush 0.9 % injection 5-40 mL  5-40 mL IntraVENous PRN Luís Hinds MD        0.9 % sodium chloride infusion  25 mL IntraVENous PRN Luís Hinds MD        ondansetron (ZOFRAN-ODT) disintegrating tablet 4 mg  4 mg Oral Q8H PRN Luís Hinds MD        Or    ondansetron (ZOFRAN) injection 4 mg  4 mg IntraVENous Q6H PRN Luís Hinds MD        acetaminophen (TYLENOL) tablet 650 mg  650 mg Oral Q6H PRN Luís Hinds MD   650 mg at 11/04/21 0259    Or    acetaminophen (TYLENOL) suppository 650 mg  650 mg Rectal Q6H PRN Luís Hinds MD        pantoprazole (PROTONIX) injection 40 mg  40 mg IntraVENous Q12H Luís Hinds MD   40 mg at 11/03/21 2007    And    sodium chloride (PF) 0.9 % injection 10 mL  10 mL IntraVENous Q12H Luís Hinds MD   10 mL at 11/03/21 2008    cefTRIAXone (ROCEPHIN) 1000 mg IVPB in 50 mL D5W minibag  1,000 mg IntraVENous Q24H Luís Hinds MD   Stopped at 11/03/21 2145     Past Medical History:   Diagnosis Date    [facility] [address]     Cancer ([facility] [address] 09/2017    right breast cancer status post lumpectomy    Edema     Fatty liver     elevated LFT's    Gout     HIGH CHOLESTEROL     HTN (hypertension)     Hypothyroidism     IFG (impaired fasting glucose)     Osteoarthritis     Palpitations     Pyelonephritis 03     Past Surgical History:   Procedure Laterality Date    APPENDECTOMY  1954    BREAST SURGERY      CATARACT REMOVAL WITH IMPLANT Left [id number]    CHOLECYSTECTOMY  12/1970    EPIDURAL STEROID INJECTION Right 11/12/2019    RIGHT LUMBAR FIVE SACRAL ONE EPIDURAL STEROID INJECTION SITE CONFIRMED BY FLUOROSCOPY performed by Gualberto Pizano MD at [address] [place] [address] Right 2007    cataract     HYSTERECTOMY  5/1971    JOINT REPLACEMENT Left 2001    hip    JOINT REPLACEMENT Right 2002    hip    OTHER SURGICAL HISTORY Right 09/27/2017    LUMPECTOMY, NEEDLE LOCALIZATION AND SENTINEL NODERIGHT LUMPECTOMY, NEEDLE LOCALIZATION AND SENTINEL NODE    THYROIDECTOMY  1968   [facility]       Nurses notes reviewed and agreed. Medications reviewed  Allergies: Allergies   Allergen Reactions    Lisinopril      Cough.  Vasotec      Cough. Physical Exam:  Vital Signs: BP (!) 156/76   Pulse 89   Temp 97.7 °F (36.5 °C) (Oral)   Resp 18   Ht 5' 3\" (1.6 m)   Wt 172 lb 5 oz (78.2 kg)   SpO2 99%   BMI 30.52 kg/m²  Body mass index is 30.52 kg/m². Airway:Normal  Cardiac:Normal  Pulmonary:Normal  Abdomen:Normal  Specific to procedure: none      Pre-Procedure Assessment/Plan:  ASA 3 - Patient with moderate systemic disease with functional limitations  Mallampati II  Level of Sedation Plan:Deep sedation    Post Procedure plan: Return to same level of care    I assessed the patient and find that the patient is in satisfactory condition to proceed with the planned procedure and sedation plan. I have explained the risk, benefits, and alternatives to the procedure. The patient understands and agrees to proceed.   Yes    Lena Sandoval MD       (O) [phone]          Lena Sandoval MD  9:26 AM 11/4/2021

## 2021-11-04 NOTE — PROGRESS NOTES
Hospitalist Progress Note      PCP: Tootie Rouse MD    Date of Admission: 11/2/2021    Chief Complaint: [address] [facility] Course: H&P reviewed      Subjective: Pt is on RA. Afebrile. VSS. No dyspnea at rest. No chest pain. No N/V/D. Medications:  Reviewed    Infusion Medications    sodium chloride       Scheduled Medications    amoxicillin-clavulanate  1 tablet Oral 2 times per day    anastrozole  1 mg Oral Daily    atorvastatin  40 mg Oral Nightly    levothyroxine  125 mcg Oral Daily    sodium chloride flush  5-40 mL IntraVENous 2 times per day     PRN Meds: ALPRAZolam, sodium chloride flush, sodium chloride, ondansetron **OR** ondansetron, acetaminophen **OR** acetaminophen      Intake/Output Summary (Last 24 hours) at 11/4/2021 1313  Last data filed at 11/4/2021 0956  Gross per 24 hour   Intake 460 ml   Output 900 ml   Net -440 ml       Physical Exam Performed:    /75   Pulse 109   Temp 98.6 °F (37 °C) (Oral)   Resp 16   Ht 5' 3\" (1.6 m)   Wt 172 lb 5 oz (78.2 kg)   SpO2 100%   BMI 30.52 kg/m²     General appearance: No apparent distress, appears stated age and cooperative. HEENT: Pupils equal, round, and reactive to light. Conjunctivae/corneas clear. Neck: Supple, with full range of motion. No jugular venous distention. Trachea midline. Respiratory:  Normal respiratory effort. Clear to auscultation, bilaterally without Rales/Wheezes/Rhonchi. Cardiovascular: Regular rate and rhythm with normal S1/S2 without murmurs, rubs or gallops. Abdomen: Soft, non-tender, non-distended with normal bowel sounds. Musculoskeletal: No clubbing, cyanosis. +BLE pitting edema. Skin: Skin color, texture, turgor normal.  No rashes or lesions. Neurologic:  Neurovascularly intact without any focal sensory/motor deficits.  Cranial nerves: II-XII intact, grossly non-focal.  Psychiatric: Alert and oriented, thought content appropriate, normal insight  Capillary Refill: Brisk,3 seconds, normal Peripheral Pulses: +2 palpable, equal bilaterally       Labs:   Recent Labs     11/02/21  1210 11/02/21  1833 11/03/21  0633 11/03/21  0633 11/03/21  1502 11/03/21  2112 11/04/21  0255   WBC 20.6*  --  18.4*  --   --   --  14.9*   HGB 5.7*   < > 8.9*   < > 8.8* 8.6* 8.2*   HCT 17.0*   < > 26.2*   < > 26.3* 25.4* 24.1*     --  166  --   --   --  148    < > = values in this interval not displayed. Recent Labs     11/02/21  1210 11/03/21  0634 11/04/21  0255    140 136   K 3.5 3.3* 3.6   CL 99 104 102   CO2 24 27 24   BUN 50* 46* 30*   CREATININE 0.8 0.7 0.6   CALCIUM 9.0 8.6 8.2*     Recent Labs     11/02/21  1210   AST 45*   ALT 49*   BILITOT 0.3   ALKPHOS 75     Recent Labs     11/02/21  1233   INR 1.24*     Recent Labs     11/02/21  2210   TROPONINI <0.01       Urinalysis:      Lab Results   Component Value Date    NITRU Negative 11/02/2021    WBCUA 6-9 11/02/2021    BACTERIA Rare 11/02/2021    RBCUA 5-10 11/02/2021    BLOODU TRACE-INTACT 11/02/2021    SPECGRAV 1.020 11/02/2021    GLUCOSEU Negative 11/02/2021       Radiology:  XR CHEST PORTABLE   Final Result   Limited exam.      No radiographic evidence of acute pulmonary disease given the limitations. Assessment/Plan:    Active Hospital Problems    Diagnosis     GIB (gastrointestinal bleeding) [K92.2]        Acute blood loss anemia due to suspected upper GI bleed:  - Pt presented with melena. Hgb 5.7 in the ED.   - S/p 2 units of PRBC. Hgb responded appropriately. Hgb relatively stable at 8.2.  - Continue to monitor serial H&Hs. Transfuse for Hgb less than 7.0 and/or symptomatic.  - Continue PPI.   - GI consulted/following. S/p EGD on 11/4 which was normal. Potential plan for capsule study and/or colonoscopy as an outpt vs inpt pending clinical course. - Iron studies WNLs. Fever / leukocytosis (resolving) likely due to UTI:  - CXR without overt signs of pneumonia. - UA concerning for infection. Urine cx grew enterococcus.    - Pt started on empiric IV rocephin on admission. Change to PO augmentin today as pt lost IV access. Lower extremity swelling (resolved):  - Etiology unclear, ?hypoalbuminemia as etiology as well as dependent edema.   - ECHO 10/15/21 showed normal LVEF of 55-60%, no RWMAs, normal LVDFP.   - Compression/elevation as tolerated. Hypokalemia:  - Monitor and replete as needed. Mag WNL. Hypertension:  - Controlled. Repeat orthostatic vitals today. - Continue to hold her home BP meds unless she becomes hypertensive.   - Monitor BP closely. Hyperlipidemia:  - Continue statin. Hypothyroidism:  - Continue levothyroxine. Breast cancer to R breast 2019:  - No surgical intervention, no XRT required. - Continue daily Arimidex. - Continue outpt follow-up with Dr. Adonis Benitez. Mild LFT elevation:  - Appears to be chronic.  - Likely secondary to alcohol use, pt reports 5-6 glasses of wine per week. - Hepatitis panel was non-reactive when checked previously. Obesity:  - With Body mass index is 30.52 kg/m². Complicating assessment and treatment. Placing patient at risk for multiple co-morbidities as well as early death and contributing to the patient's presentation. Counseled on weight loss. DVT Prophylaxis: SCDs  Diet: ADULT DIET;  Regular; Low Fat/Low Chol/High Fiber/BORA  Code Status: Full Code    PT/OT Eval Status: ordered with recs for SNF    Dispo - 1-2 days pending clinical course     LONG Parish - CNP

## 2021-11-04 NOTE — ANESTHESIA POSTPROCEDURE EVALUATION
Department of Anesthesiology  Postprocedure Note    Patient: Cas Vidal  MRN: [id number]  YOB: 1940  Date of evaluation: 11/4/2021  Time:  10:30 AM     Procedure Summary     Date: 11/04/21 Room / Location: [facility], [place]    Anesthesia Start: 6507 Anesthesia Stop: 5066    Procedure: EGD DIAGNOSTIC ONLY (N/A ) Diagnosis: (GI Bleed)    Surgeons: Evita Rivera MD Responsible Provider: Judi Martins MD    Anesthesia Type: general ASA Status: 3 - Emergent          Anesthesia Type: general    Jonathan Phase I:      Jonathan Phase II: Jonathan Score: 9    Last vitals: Reviewed and per EMR flowsheets.        Anesthesia Post Evaluation    Comments: Postoperative Anesthesia Note    Name:    Cas Vidal  MRN:      [id number]    Patient Vitals in the past 12 hrs:  11/04/21 1010, BP:(!) 126/50, Pulse:85, Resp:16, SpO2:99 %  11/04/21 0958, BP:(!) 122/46, Pulse:85, Resp:18, SpO2:100 %  11/04/21 0815, BP:(!) 156/76  11/04/21 0800, BP:(!) 156/76, Pulse:89, SpO2:99 %  11/04/21 0258, BP:(!) 159/62, Temp:97.7 °F (36.5 °C), Temp src:Oral, Pulse:87, Resp:18, SpO2:98 %     LABS:    CBC  Lab Results       Component                Value               Date/Time                  WBC                      14.9 (H)            11/04/2021 02:55 AM        HGB                      8.2 (L)             11/04/2021 02:55 AM        HCT                      24.1 (L)            11/04/2021 02:55 AM        PLT                      148                 11/04/2021 02:55 AM   RENAL  Lab Results       Component                Value               Date/Time                  NA                       136                 11/04/2021 02:55 AM        K                        3.6                 11/04/2021 02:55 AM        CL                       102                 11/04/2021 02:55 AM        CO2                      24                  11/04/2021 02:55 AM        BUN                      30 (H)              11/04/2021 02:55 AM CREATININE               0.6                 11/04/2021 02:55 AM        GLUCOSE                  107 (H)             11/04/2021 02:55 AM   COAGS  Lab Results       Component                Value               Date/Time                  PROTIME                  14.1 (H)            11/02/2021 12:33 PM        INR                      1.24 (H)            11/02/2021 12:33 PM        APTT                     26.6                11/02/2021 12:33 PM     Intake & Output:  @47BGKO@    Nausea & Vomiting:  No    Level of Consciousness:  Awake    Pain Assessment:  Adequate analgesia    Anesthesia Complications:  No apparent anesthetic complications    SUMMARY      Vital signs stable  OK to discharge from Stage I post anesthesia care.   Care transferred from Anesthesiology department on discharge from perioperative area

## 2021-11-04 NOTE — ANESTHESIA PRE PROCEDURE
Department of Anesthesiology  Preprocedure Note       Name:  Cas Vidal   Age:  78 y.o.  :  1940                                          MRN:  [id number]         Date:  2021      Surgeon: Megan Traylor):  Evita Rivera MD    Procedure: Procedure(s):  EGD DIAGNOSTIC ONLY    Medications prior to admission:   Prior to Admission medications    Medication Sig Start Date End Date Taking?  Authorizing Provider   furosemide (LASIX) 20 MG tablet Take 20 mg by mouth daily    Yes Historical Provider, MD   potassium chloride (MICRO-K) 10 MEQ extended release capsule Take 10 mEq by mouth 2 times daily   Yes Historical Provider, MD   benzocaine (ORAJEL) 20 % GEL mucosal gel Take by mouth 2 times daily as needed for Pain   Yes Historical Provider, MD   cloNIDine (CATAPRES) 0.1 MG tablet TAKE 1/2 TABLET BY MOUTH DAILY FOR HIGH BLOOD PRESSURE 21  Yes Historical Provider, MD   aspirin 81 MG chewable tablet Take 1 tablet by mouth daily 10/18/21  Yes Manuela Chaparro MD   levothyroxine (SYNTHROID) 125 MCG tablet TAKE 1 TABLET BY MOUTH DAILY FOR THYROID 10/13/21  Yes Rita Montelongo, APRN - CNP   atorvastatin (LIPITOR) 40 MG tablet TAKE 1/2 TABLET BY MOUTH DAILY FOR HIGH CHOLESTEROL 21  Yes Arnaud Pitts MD   metoprolol succinate (TOPROL XL) 50 MG extended release tablet TAKE 1 TABLET BY MOUTH EVERY DAY FOR HIGH BLOOD PRESSURE 21  Yes Arnaud Pitts MD   Multiple Vitamins-Minerals (THERAPEUTIC MULTIVITAMIN-MINERALS) tablet Take 1 tablet by mouth daily   Yes Historical Provider, MD   anastrozole (ARIMIDEX) 1 MG tablet TK 1 T PO QD 18  Yes Historical Provider, MD       Current medications:    Current Facility-Administered Medications   Medication Dose Route Frequency Provider Last Rate Last Admin    anastrozole (ARIMIDEX) tablet 1 mg  1 mg Oral Daily Sara Steven MD   1 mg at 21 0858    atorvastatin (LIPITOR) tablet 40 mg  40 mg Oral Nightly Sara Spray, MD   40 mg at 21    levothyroxine (SYNTHROID) tablet 125 mcg  125 mcg Oral Daily Francisco Javier Tan MD   125 mcg at 11/03/21 0641    sodium chloride flush 0.9 % injection 5-40 mL  5-40 mL IntraVENous 2 times per day Francisco Javier Tan MD   10 mL at 11/03/21 2008    sodium chloride flush 0.9 % injection 5-40 mL  5-40 mL IntraVENous PRN Francisco Javier Tan MD        0.9 % sodium chloride infusion  25 mL IntraVENous PRN Francisco Javier Tan MD        ondansetron (ZOFRAN-ODT) disintegrating tablet 4 mg  4 mg Oral Q8H PRN Francisco Javier Tan MD        Or    ondansetron (ZOFRAN) injection 4 mg  4 mg IntraVENous Q6H PRN Francisco Javier Tan MD        acetaminophen (TYLENOL) tablet 650 mg  650 mg Oral Q6H PRN Francisco Javier Tan MD   650 mg at 11/04/21 0259    Or    acetaminophen (TYLENOL) suppository 650 mg  650 mg Rectal Q6H PRN Francisco Javier Tan MD        pantoprazole (PROTONIX) injection 40 mg  40 mg IntraVENous Q12H Francisco Javier Tan MD   40 mg at 11/03/21 2007    And    sodium chloride (PF) 0.9 % injection 10 mL  10 mL IntraVENous Q12H Francisco Javier Tan MD   10 mL at 11/03/21 2008    cefTRIAXone (ROCEPHIN) 1000 mg IVPB in 50 mL D5W minibag  1,000 mg IntraVENous Q24H Francisco Javier Tan MD   Stopped at 11/03/21 2145       Allergies: Allergies   Allergen Reactions    Lisinopril      Cough.  Vasotec      Cough.          Problem List:    Patient Active Problem List   Diagnosis Code    Hypothyroidism E03.9    HTN (hypertension) I10    Gout M10.9    Arthritis M19.90    Palpitations R00.2    Edema R60.9    Elevated LFTs R79.89    IFG (impaired fasting glucose) R73.01    Fatty liver K76.0    Pyelonephritis N12    Localized osteoarthrosis, lower leg M17.10    DDD (degenerative disc disease), lumbar M51.36    Hyperlipidemia E78.5    Cataract H26.9    Sacroiliitis (HCC) M46.1    Hip pain M25.559    Peripheral edema R60.9    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    Spinal stenosis of lumbar region M48.061    Thrombocytopenia (HCC) D69.6    Chest pain R07.9    NSTEMI (non-ST elevated myocardial infarction) (HCC) I21.4    Orthostatic lightheadedness R42    UTI (urinary tract infection) N39.0    GIB (gastrointestinal bleeding) K92.2       Past Medical History:        Diagnosis Date    [facility] [address]     Cancer ([facility] [address] 09/2017    right breast cancer status post lumpectomy    Edema     Fatty liver     elevated LFT's    Gout     HIGH CHOLESTEROL     HTN (hypertension)     Hypothyroidism     IFG (impaired fasting glucose)     Osteoarthritis     Palpitations     Pyelonephritis 03       Past Surgical History:        Procedure Laterality Date    APPENDECTOMY  1954    BREAST SURGERY      CATARACT REMOVAL WITH IMPLANT Left [id number]    CHOLECYSTECTOMY  12/1970    EPIDURAL STEROID INJECTION Right 11/12/2019    RIGHT LUMBAR FIVE SACRAL ONE EPIDURAL STEROID INJECTION SITE CONFIRMED BY FLUOROSCOPY performed by Elvi Felton MD at [address] [place] [address] Right 2007    cataract     HYSTERECTOMY  5/1971    JOINT REPLACEMENT Left 2001    hip    JOINT REPLACEMENT Right 2002    hip    OTHER SURGICAL HISTORY Right 09/27/2017    LUMPECTOMY, NEEDLE LOCALIZATION AND SENTINEL NODERIGHT LUMPECTOMY, NEEDLE LOCALIZATION AND SENTINEL NODE    THYROIDECTOMY  1968    TONSILLECTOMY  1945       Social History:    Social History     Tobacco Use    Smoking status: Never Smoker    Smokeless tobacco: Never Used   Substance Use Topics    Alcohol use:  Yes     Alcohol/week: 5.0 - 6.0 standard drinks     Types: 5 - 6 Glasses of wine per week     Comment: per week                                Counseling given: Not Answered      Vital Signs (Current):   Vitals:    11/03/21 2005 11/04/21 0258 11/04/21 0800 11/04/21 0815   BP: (!) 161/76 (!) 159/62 (!) 156/76 (!) 156/76   Pulse: 89 87 89    Resp: 18 18     Temp: 98 °F (36.7 °C) 97.7 °F (36.5 °C)     TempSrc: Oral Oral     SpO2: 98% 98% 99%    Weight:       Height: BP Readings from Last 3 Encounters:   11/04/21 (!) 156/76   10/20/21 130/75   10/17/21 113/70       NPO Status: Time of last liquid consumption: 1600                        Time of last solid consumption: 1600                        Date of last liquid consumption: 11/03/21                        Date of last solid food consumption: 11/03/21    BMI:   Wt Readings from Last 3 Encounters:   11/02/21 172 lb 5 oz (78.2 kg)   10/20/21 171 lb 9.6 oz (77.8 kg)   10/14/21 160 lb 1.6 oz (72.6 kg)     Body mass index is 30.52 kg/m². CBC:   Lab Results   Component Value Date    WBC 14.9 11/04/2021    RBC 2.48 11/04/2021    HGB 8.2 11/04/2021    HCT 24.1 11/04/2021    MCV 97.4 11/04/2021    RDW 22.1 11/04/2021     11/04/2021       CMP:   Lab Results   Component Value Date     11/04/2021    K 3.6 11/04/2021     11/04/2021    CO2 24 11/04/2021    BUN 30 11/04/2021    CREATININE 0.6 11/04/2021    GFRAA >60 11/04/2021    GFRAA >60 11/08/2012    AGRATIO 1.5 11/02/2021    LABGLOM >60 11/04/2021    GLUCOSE 107 11/04/2021    PROT 4.9 11/02/2021    PROT 7.3 11/08/2012    CALCIUM 8.2 11/04/2021    BILITOT 0.3 11/02/2021    ALKPHOS 75 11/02/2021    AST 45 11/02/2021    ALT 49 11/02/2021       POC Tests: No results for input(s): POCGLU, POCNA, POCK, POCCL, POCBUN, POCHEMO, POCHCT in the last 72 hours.     Coags:   Lab Results   Component Value Date    PROTIME 14.1 11/02/2021    INR 1.24 11/02/2021    APTT 26.6 11/02/2021       HCG (If Applicable): No results found for: PREGTESTUR, PREGSERUM, HCG, HCGQUANT     ABGs:   Lab Results   Component Value Date    PHART 7.492 10/14/2021    PO2ART 130.8 10/14/2021    WUZ5RDU 22.1 10/14/2021    KFM2SUS 16.5 10/14/2021    BEART -4.5 10/14/2021    Z1BODGAS 98.7 10/14/2021        Type & Screen (If Applicable):  No results found for: LABABO, LABRH    Drug/Infectious Status (If Applicable):  No results found for: HIV, HEPCAB    COVID-19 Screening (If Applicable):   Lab Results   Component Value Date    COVID19 Not Detected 11/02/2021           Anesthesia Evaluation  Patient summary reviewed no history of anesthetic complications:   Airway: Mallampati: II  TM distance: >3 FB   Neck ROM: full  Mouth opening: > = 3 FB Dental: normal exam         Pulmonary:Negative Pulmonary ROS and normal exam  breath sounds clear to auscultation                             Cardiovascular:Negative CV ROS  Exercise tolerance: good (>4 METS),   (+) hypertension:, past MI:,         Rhythm: regular  Rate: normal                    Neuro/Psych:   Negative Neuro/Psych ROS              GI/Hepatic/Renal: Neg GI/Hepatic/Renal ROS  (+) liver disease:,           Endo/Other: Negative Endo/Other ROS   (+) hypothyroidism, blood dyscrasia: anemia, arthritis:., .                 Abdominal:             Vascular: negative vascular ROS. Other Findings:        Pre-Operative Diagnosis: GIB (gastrointestinal bleeding) [K92.2]; Blood loss anemia [D50.0]; UGIB (upper gastrointestinal bleed) [K92.2]    78 y.o.   BMI:  Body mass index is 30.52 kg/m². Vitals:    11/03/21 2005 11/04/21 0258 11/04/21 0800 11/04/21 0815   BP: (!) 161/76 (!) 159/62 (!) 156/76 (!) 156/76   Pulse: 89 87 89    Resp: 18 18     Temp: 98 °F (36.7 °C) 97.7 °F (36.5 °C)     TempSrc: Oral Oral     SpO2: 98% 98% 99%    Weight:       Height:           Allergies   Allergen Reactions    Lisinopril      Cough.  Vasotec      Cough. Social History     Tobacco Use    Smoking status: Never Smoker    Smokeless tobacco: Never Used   Substance Use Topics    Alcohol use:  Yes     Alcohol/week: 5.0 - 6.0 standard drinks     Types: 5 - 6 Glasses of wine per week     Comment: per week       LABS:    CBC  Lab Results   Component Value Date/Time    WBC 14.9 (H) 11/04/2021 02:55 AM    HGB 8.2 (L) 11/04/2021 02:55 AM    HCT 24.1 (L) 11/04/2021 02:55 AM     11/04/2021 02:55 AM     RENAL  Lab Results   Component Value Date/Time     11/04/2021 02:55 AM    K 3.6 11/04/2021 02:55 AM     11/04/2021 02:55 AM    CO2 24 11/04/2021 02:55 AM    BUN 30 (H) 11/04/2021 02:55 AM    CREATININE 0.6 11/04/2021 02:55 AM    GLUCOSE 107 (H) 11/04/2021 02:55 AM     COAGS  Lab Results   Component Value Date/Time    PROTIME 14.1 (H) 11/02/2021 12:33 PM    INR 1.24 (H) 11/02/2021 12:33 PM    APTT 26.6 11/02/2021 12:33 PM          Anesthesia Plan      general     ASA 3 - emergent     (I discussed with the patient the risks and benefits of PIV, anesthesia, IV Narcotics, PACU. All questions were answered the patient agrees with the plan and wishes to proceed)  Induction: intravenous.                           Flory Koenig MD   11/4/2021

## 2021-11-04 NOTE — PROGRESS NOTES
Infiltrated IV removed from left AC. Catheter intact, clean, dry dressing placed.    Gabriele Macias RN

## 2021-11-04 NOTE — PROGRESS NOTES
Physical Therapy  Facility/Department: [facility] C3 TELE/MED SURG/ONC  Daily Treatment Note  NAME: Parminder Dove  : 1940  MRN: [id number]    Date of Service: 2021    Discharge Recommendations:  Subacute/Skilled Nursing Facility   PT Equipment Recommendations  Equipment Needed: No    Assessment   Body structures, Functions, Activity limitations: Decreased functional mobility ; Decreased sensation; Vestibular Impairment;Decreased balance  Assessment: Significant improvement with tolerance and decreased assistance with transfers and ambulation. Pt denied dizziness and BP elevated through session. Pt was able to ambulate to the bathroom with Min A and SW and returned to bed. Additional time was required for rest breaks and manage brief. Pt returned to bed at EOS. Treatment Diagnosis: impaired mobility  Prognosis: Good  Decision Making: Medium Complexity  PT Education: Goals;Gait Training;Disease Specific Education;PT Role;Plan of Care; Functional Mobility Training;Home Exercise Program;Transfer Training;General Safety  Patient Education: pt verblized understanding of importnce of OOB activity  Barriers to Learning: no  REQUIRES PT FOLLOW UP: Yes  Activity Tolerance  Activity Tolerance: Patient Tolerated treatment well;Treatment limited secondary to medical complications (free text)  Activity Tolerance: 185/87, 96 HR, 97% SpO2     Patient Diagnosis(es): The primary encounter diagnosis was UGIB (upper gastrointestinal bleed). A diagnosis of Blood loss anemia was also pertinent to this visit. has a past medical history of Breast cancer ([address] Cancer ([address] Edema, Fatty liver, Gout, HIGH CHOLESTEROL, HTN (hypertension), Hypothyroidism, IFG (impaired fasting glucose), Osteoarthritis, Palpitations, and Pyelonephritis. has a past surgical history that includes Thyroidectomy (); Tonsillectomy (); Appendectomy (); Cholecystectomy (1970);  Hysterectomy (1971); eye surgery (Right, 2007); joint replacement (Left, 2001); joint replacement (Right, 2002); Cataract removal with implant (Left, [id number]); other surgical history (Right, 09/27/2017); epidural steroid injection (Right, 11/12/2019); Breast surgery; and Upper gastrointestinal endoscopy (N/A, 11/4/2021). Restrictions  Restrictions/Precautions  Restrictions/Precautions: General Precautions  Position Activity Restriction  Other position/activity restrictions: up with assist, contact plus for C-diff  Subjective   General  Chart Reviewed: Yes  Response To Previous Treatment: Patient with no complaints from previous session. Family / Caregiver Present: No  Referring Practitioner: Shala Mcguire CNP  Subjective  Subjective: Pt agreeable to PT  General Comment  Comments: Pt resting in bed upon entry, RN cleared pt for therapy  Pain Screening  Patient Currently in Pain: Denies  Vital Signs  Patient Currently in Pain: Denies       Orientation  Orientation  Overall Orientation Status: Within Normal Limits  Cognition      Objective   Bed mobility  Supine to Sit: Minimal assistance  Sit to Supine: Moderate assistance  Transfers  Sit to Stand:  Moderate Assistance  Stand to sit: Moderate Assistance  Ambulation  Ambulation?: Yes  Ambulation 1  Device: Standard Walker  Assistance: Minimal assistance  Gait Deviations: Slow Mily  Distance: 10' x 2  Stairs/Curb  Stairs?: No     Balance  Posture: Fair  Sitting - Static: Good  Sitting - Dynamic: Good  Standing - Static: Fair;+  Standing - Dynamic: Fair  Exercises  Quad Sets: x 10 B  Heelslides: x 10 B  Gluteal Sets: x 10 B  Knee Long Arc Quad: x 10 B  Ankle Pumps: 2 x 20 B            AM-PAC Score  AM-PAC Inpatient Mobility Raw Score : 14 (11/04/21 1620)  AM-PAC Inpatient T-Scale Score : 38.1 (11/04/21 1620)  Mobility Inpatient CMS 0-100% Score: 61.29 (11/04/21 1620)  Mobility Inpatient CMS G-Code Modifier : CL (11/04/21 1620)          Goals  Short term goals  Time Frame for Short term goals: 11/10/21 unless noted  Short

## 2021-11-04 NOTE — PROGRESS NOTES
Dr. Christina Atkins successfully placed a 22g IV in top of right foot but recommends a PICC line. CRNA starting sedation for EGD at this time.   Lana Hart RN

## 2021-11-04 NOTE — PROGRESS NOTES
Pt alert and oriented, VSS. Shift assessment completed and documented. BLE swelling improving. Pt denies any needs at this time. Bed locked and in lowest position. Call light within reach.

## 2021-11-04 NOTE — CARE COORDINATION
Hospital day 2: Patient on C3 re GIB followed by IM and GI. Planned EGD this date monitor H/H possible return to [facility] 11/05, will need rapid COVID. SW will ct to follow for DCP needs. TED Astudillo

## 2021-11-04 NOTE — PROGRESS NOTES
Immediately preoperatively in procedural area, anesthesia noticed that IV was infiltrated. Anesthesiologist, CRNA, and preop RN all at bedside, attempting new IV. Dr. Sergei Kohler aware of delay issue.   Mitchell Wilkes RN

## 2021-11-04 NOTE — PROGRESS NOTES
Ready to go to room. Awake and alert with no complaints. Asking about results. Unable to reach Dr Marvle James per phone - informed the patient of what the report was and she can call Dr Marvel James today if he does not come up to her room later.

## 2021-11-04 NOTE — OP NOTE
Esophagogastroduodenoscopy Note    Patient:   Alva Brown    YOB: 1940    Facility:   [place] [address] [Inpatient]   Referring/PCP: Alis Prince MD    Procedure:   Esophagogastroduodenoscopy --diagnostic  Date:     11/4/2021   Endoscopist:  Pretty Larson MD     Preoperative Diagnosis: Melena/anemia on Meloxicam H/H 5.7/17  Postoperative Diagnosis:  Normal, sp possible SI ulcers from Meloxicam    Anesthesia:  MAC  Estimated blood loss: None  Complications: None    Description of Procedure:  Informed consent was obtained from the patient after explanation of the procedure including indications, description of the procedure,  benefits and possible risks and complications of the procedure, and alternatives. Questions were answered. The patient's history was reviewed and a directed physical examination was performed prior to the procedure. Patient was monitored throughout the procedure with pulse oximetry and periodic assessment of vital signs. Patient was sedated as noted above. The Nursing staff and I performed a time out. With the patient in the left lateral decubitus position, the Olympus videoendoscope was placed in the patient's mouth and under direct visualization passed into the esophagus. The scope was ultimately passed to the third portion of the duodenum. Visualization was performed during both introduction and withdrawal of the endoscope and retroflexed view of the proximal stomach was obtained. Findings[de-identified]   Esophagus: normal. The findings do not support a diagnosis of Coffman's Esophagus. Stomach: normal  Duodenum: normal    Recommendations: -Follow clinical symptoms and laboratory studies for evidence of rebleeding. , -OK to D/C in am off NSAID's if stable and will schedule outpatient colonoscopy and possible Capsule Endoscopy    Pretty Larson MD       (C) [phone]          Pretty Larson MD, MD

## 2021-11-05 VITALS
DIASTOLIC BLOOD PRESSURE: 81 MMHG | OXYGEN SATURATION: 98 % | WEIGHT: 172.31 LBS | BODY MASS INDEX: 30.53 KG/M2 | SYSTOLIC BLOOD PRESSURE: 109 MMHG | HEART RATE: 90 BPM | RESPIRATION RATE: 16 BRPM | HEIGHT: 63 IN | TEMPERATURE: 98.1 F

## 2021-11-05 LAB
ANION GAP SERPL CALCULATED.3IONS-SCNC: 9 MMOL/L (ref 3–16)
BUN BLDV-MCNC: 18 MG/DL (ref 7–20)
CALCIUM SERPL-MCNC: 8.2 MG/DL (ref 8.3–10.6)
CHLORIDE BLD-SCNC: 104 MMOL/L (ref 99–110)
CO2: 25 MMOL/L (ref 21–32)
CREAT SERPL-MCNC: 0.6 MG/DL (ref 0.6–1.2)
GFR AFRICAN AMERICAN: >60
GFR NON-AFRICAN AMERICAN: >60
GLUCOSE BLD-MCNC: 106 MG/DL (ref 70–99)
HCT VFR BLD CALC: 24.1 % (ref 36–48)
HEMOGLOBIN: 8.3 G/DL (ref 12–16)
MCH RBC QN AUTO: 34.3 PG (ref 26–34)
MCHC RBC AUTO-ENTMCNC: 34.3 G/DL (ref 31–36)
MCV RBC AUTO: 100 FL (ref 80–100)
ORGANISM: ABNORMAL
PDW BLD-RTO: 23.5 % (ref 12.4–15.4)
PLATELET # BLD: 101 K/UL (ref 135–450)
PMV BLD AUTO: 8.1 FL (ref 5–10.5)
POTASSIUM REFLEX MAGNESIUM: 3.6 MMOL/L (ref 3.5–5.1)
RBC # BLD: 2.41 M/UL (ref 4–5.2)
SARS-COV-2, NAAT: NOT DETECTED
SODIUM BLD-SCNC: 138 MMOL/L (ref 136–145)
URINE CULTURE, ROUTINE: ABNORMAL
WBC # BLD: 8.5 K/UL (ref 4–11)

## 2021-11-05 PROCEDURE — 6370000000 HC RX 637 (ALT 250 FOR IP): Performed by: INTERNAL MEDICINE

## 2021-11-05 PROCEDURE — 97110 THERAPEUTIC EXERCISES: CPT

## 2021-11-05 PROCEDURE — 87635 SARS-COV-2 COVID-19 AMP PRB: CPT

## 2021-11-05 PROCEDURE — 85027 COMPLETE CBC AUTOMATED: CPT

## 2021-11-05 PROCEDURE — 6370000000 HC RX 637 (ALT 250 FOR IP): Performed by: REGISTERED NURSE

## 2021-11-05 PROCEDURE — 80048 BASIC METABOLIC PNL TOTAL CA: CPT

## 2021-11-05 PROCEDURE — 97535 SELF CARE MNGMENT TRAINING: CPT

## 2021-11-05 PROCEDURE — 36415 COLL VENOUS BLD VENIPUNCTURE: CPT

## 2021-11-05 RX ORDER — AMOXICILLIN AND CLAVULANATE POTASSIUM 500; 125 MG/1; MG/1
1 TABLET, FILM COATED ORAL EVERY 12 HOURS SCHEDULED
Qty: 14 TABLET | Refills: 0 | Status: SHIPPED | OUTPATIENT
Start: 2021-11-05 | End: 2021-11-12

## 2021-11-05 ASSESSMENT — PAIN SCALES - GENERAL: PAINLEVEL_OUTOF10: 6

## 2021-11-05 NOTE — PROGRESS NOTES
Pt axo. Assessment completed as charted. Pt denies needs at this time. Will continue to monitor. Call light in reach.

## 2021-11-05 NOTE — PLAN OF CARE
Problem: Falls - Risk of:  Goal: Will remain free from falls  Description: Will remain free from falls  11/4/2021 2334 by Abe Ramirez RN  Outcome: Ongoing    Pt A&Ox4. Pt appropriately calls out for assistance. Nonskid slippers in use. Bed locked and in lowest position. Bed alarm is on. Call light and personal belongings within reach. Pt has remained free from falls this shift. Will continue to monitor.

## 2021-11-05 NOTE — PROGRESS NOTES
Shift assessment completed per documentation. Pt A&Ox4. VSS. Pt awake in bed. Pt denies pain and discomfort. Bilateral lung sounds clear. Abdomen soft, rounded, and rotund. Bowel sounds active. Bed locked and in lowest position. Bed alarm on. Nonskid slippers on. Side rails up 2/4. Call light and personal belongings within reach. Will continue to monitor.

## 2021-11-05 NOTE — PROGRESS NOTES
Occupational Therapy  Facility/Department: [facility] C3 TELE/MED SURG/ONC  Daily Treatment Note  NAME: Angel Olmos  : 1940  MRN: [id number]    Date of Service: 2021    Discharge Recommendations:  Subacute/Skilled Nursing Facility     Assessment   Performance deficits / Impairments: Decreased functional mobility ; Decreased ADL status; Decreased strength;Decreased safe awareness;Decreased cognition;Decreased endurance;Decreased balance  Assessment: Pt is functioning below baseline for ADLs and mobility. She reports LE edema and general weakness as limiting factors. Total assist needed for LE ADLs. Recommend SNF for skilled OT to address ADLs and transfers. Cont OT in acute care. Prognosis: Fair  OT Education: OT Role;Plan of Care;Home Exercise Program;ADL Adaptive Strategies  Disease Specific Education: Pt educated on importance of OOB mobility, prevention of complications of bedrest, and general safety during hospitalization. Pt verbalized understanding  REQUIRES OT FOLLOW UP: Yes  Activity Tolerance  Activity Tolerance: Patient limited by fatigue  Activity Tolerance: /76, HR 90, O2 sats 98% RA  Safety Devices  Safety Devices in place: Yes  Type of devices: Nurse notified;Call light within reach; Bed alarm in place; Left in bed       Patient Diagnosis(es): The primary encounter diagnosis was UGIB (upper gastrointestinal bleed). A diagnosis of Blood loss anemia was also pertinent to this visit. has a past medical history of Breast cancer ([address] [address] Edema, Fatty liver, Gout, HIGH CHOLESTEROL, HTN (hypertension), Hypothyroidism, IFG (impaired fasting glucose), Osteoarthritis, Palpitations, and Pyelonephritis. has a past surgical history that includes Thyroidectomy (); Tonsillectomy (); Appendectomy (); Cholecystectomy (1970); Hysterectomy (1971); eye surgery (Right, ); joint replacement (Left, ); joint replacement (Right, );  Cataract removal with implant (Left, [id number]); other surgical history (Right, 09/27/2017); epidural steroid injection (Right, 11/12/2019); Breast surgery; and Upper gastrointestinal endoscopy (N/A, 11/4/2021). Restrictions  Restrictions/Precautions  Restrictions/Precautions: General Precautions, Fall Risk, Contact Precautions  Position Activity Restriction  Other position/activity restrictions: up with assist, contact plus for C-diff  Subjective   General  Chart Reviewed: Yes  Patient assessed for rehabilitation services?: Yes  Family / Caregiver Present: No  Referring Practitioner: Anjel uGstafson NP  Diagnosis: melena, GI bleed  Subjective  Subjective: Pt agreeable to OT  General Comment  Comments: RN approved therapy  Vital Signs  Patient Currently in Pain: Denies   Orientation  Orientation  Overall Orientation Status: Within Functional Limits  Objective    ADL  Grooming: Stand by assistance (upright in bed)  UE Dressing: Moderate assistance  LE Dressing: Dependent/Total  Toileting: Dependent/Total        Standing Balance  Activity: Pt declined transfer OOB or sitting EOB, requesting to keep BLE elevated on bed.          Cognition  Overall Cognitive Status: WFL         Type of ROM/Therapeutic Exercise  Type of ROM/Therapeutic Exercise: AROM  Comment: Performed UE exercises in bed  Exercises  Shoulder Elevation: 10x  Shoulder Flexion: 10x  Horizontal ABduction: 10x  Horizontal ADduction: 10x  Elbow Flexion: 10x  Elbow Extension: 10x  Supination: 10x  Pronation: 10x  Grasp/Release: 10x  Other: Performed ankle pumps 10 x each       Plan   Plan  Times per week: 3-5x/wk  AM-PAC Score      AM-[facility] Inpatient Daily Activity Raw Score: 13 (11/05/21 1417)  AM-PAC Inpatient ADL T-Scale Score : 32.03 (11/05/21 1417)  ADL Inpatient CMS 0-100% Score: 63.03 (11/05/21 1417)  ADL Inpatient CMS G-Code Modifier : CL (11/05/21 1417)  Goals  Short term goals  Time Frame for Short term goals: 1 week by 11/10  Short term goal 1: Pt will complete functional transfers with Min A or less-ELENI 11/5  Short term goal 2: Pt will complete LB ADLs with mod A or less-ongoing, total assist 11/5  Short term goal 3: Pt will complete toileting with CGA-ongoing 11/5  Short term goal 4: Pt will tolerate B UE ex x 20 reps w/o fatigue-ongoing 11/5  Patient Goals   Patient goals : \"get strength back\"     Therapy Time   Individual Concurrent Group Co-treatment   Time In 1137         Time Out 6721         Minutes 30         Timed Code Treatment Minutes: 30 Minutes    If pt is discharged prior to next OT session, this note will serve as the discharge summary.   Jas Lamb OT

## 2021-11-05 NOTE — DISCHARGE SUMMARY
Hospital Medicine Discharge Summary    Patient ID: Rolando Davis      Patient's PCP: Aye Wu MD    Admit Date: 11/2/2021     Discharge Date:   11/5/2021    Admitting Physician: Jd Adams MD     Discharge Physician: LONG Pascal CNP     Discharge Diagnoses: Active Hospital Problems    Diagnosis     GIB (gastrointestinal bleeding) [K92.2]        The patient was seen and examined on day of discharge and this discharge summary is in conjunction with any daily progress note from day of discharge. Hospital Course:   78 y.o. female who presented to [facility] with complaints of melena. She was recently discharged after being treated for UTI. Currently she is at rehabilitation. Yesterday after therapy she vomited up her spaghetti that she had for lunch. She did not feel that great last night. She felt so weak and dizzy with activity. She noticed tarry loose stool last night. Today she noticed there is some blood in the stool. She does have some shortness of breath. Denies chest pain, syncope, change in mental status, fever, cough, hematemesis, hematuria or epistaxis. Acute blood loss anemia due to suspected upper GI bleed:  - Pt presented with melena. Hgb 5.7 in the ED.   - S/p 2 units of PRBC. Hgb responded appropriately. Hgb relatively stable at 8.3.  - GI consulted. S/p EGD on 11/4 which was normal. Potential plan for capsule study and/or colonoscopy as an outpt. - Iron studies WNLs. - No BMs while inpt. - Continue to hold pt's aspirin and mobic.      Fever / leukocytosis (resolved) likely due to UTI:  - CXR without overt signs of pneumonia. - UA concerning for infection. Urine cx grew enterococcus.   - Pt started on empiric IV rocephin on admission.  Changed to PO augmentin.     Lower extremity swelling (resolved):  - Etiology unclear, ?hypoalbuminemia as etiology as well as dependent edema.   - ECHO 10/15/21 showed normal LVEF of 55-60%, no RWMAs, normal LVDFP.   - Compression/elevation as tolerated.      Hypokalemia:  - Monitor and replete as needed. Mag WNL.      Hypertension:  - Was orthostatic but this has improved. - Held home meds while inpt, can resume at dc.      Hyperlipidemia:  - Continue statin.      Hypothyroidism:  - Continue levothyroxine.      Breast cancer to R breast 2019:  - No surgical intervention, no XRT required. - Continue daily Arimidex. - Continue outpt follow-up with Dr. Ji Pozo.      Mild LFT elevation:  - Appears to be chronic.  - Likely secondary to alcohol use, pt reports 5-6 glasses of wine per week. - Hepatitis panel was non-reactive when checked previously.     Obesity:  - With Body mass index is 30.52 kg/m². Complicating assessment and treatment. Placing patient at risk for multiple co-morbidities as well as early death and contributing to the patient's presentation. Counseled on weight loss.        Physical Exam Performed:     /81   Pulse 90   Temp 98.1 °F (36.7 °C) (Axillary)   Resp 18   Ht 5' 3\" (1.6 m)   Wt 172 lb 5 oz (78.2 kg)   SpO2 98%   BMI 30.52 kg/m²       General appearance:  No apparent distress, appears stated age and cooperative. HEENT:  Normal cephalic, atraumatic without obvious deformity. Pupils equal, round, and reactive to light. Extra ocular muscles intact. Conjunctivae/corneas clear. Neck: Supple, with full range of motion. No jugular venous distention. Trachea midline. Respiratory:  Normal respiratory effort. Clear to auscultation, bilaterally without Rales/Wheezes/Rhonchi. Cardiovascular:  Regular rate and rhythm with normal S1/S2 without murmurs, rubs or gallops. Abdomen: Soft, non-tender, non-distended with normal bowel sounds. Musculoskeletal:  No clubbing, cyanosis or edema bilaterally. Full range of motion without deformity. Skin: Skin color, texture, turgor normal.  No rashes or lesions. Neurologic:  Neurovascularly intact without any focal sensory/motor deficits.  Cranial nerves: II-XII intact, grossly non-focal.  Psychiatric:  Alert and oriented, thought content appropriate, normal insight  Capillary Refill: Brisk,< 3 seconds   Peripheral Pulses: +2 palpable, equal bilaterally       Labs: For convenience and continuity at follow-up the following most recent labs are provided:      CBC:    Lab Results   Component Value Date    WBC 8.5 11/05/2021    HGB 8.3 11/05/2021    HCT 24.1 11/05/2021     11/05/2021       Renal:    Lab Results   Component Value Date     11/05/2021    K 3.6 11/05/2021     11/05/2021    CO2 25 11/05/2021    BUN 18 11/05/2021    CREATININE 0.6 11/05/2021    CALCIUM 8.2 11/05/2021         Significant Diagnostic Studies    Radiology:   XR CHEST PORTABLE   Final Result   Limited exam.      No radiographic evidence of acute pulmonary disease given the limitations.                 Consults:     IP CONSULT TO GI  IP CONSULT TO HOSPITALIST  IP CONSULT TO GI  IP CONSULT TO SPIRITUAL SERVICES  IP CONSULT TO SOCIAL WORK    Disposition:  [facility]    Condition at Discharge: Stable    Discharge Instructions/Follow-up:  Follow-up with PCP and GI (Dr. Cedric Blevins)    Code Status:  Full Code     Activity: activity as tolerated    Diet: regular diet      Discharge Medications:     Current Discharge Medication List           Details   amoxicillin-clavulanate (AUGMENTIN) 500-125 MG per tablet Take 1 tablet by mouth every 12 hours for 7 days  Qty: 14 tablet, Refills: 0              Details   furosemide (LASIX) 20 MG tablet Take 20 mg by mouth daily       potassium chloride (MICRO-K) 10 MEQ extended release capsule Take 10 mEq by mouth 2 times daily      benzocaine (ORAJEL) 20 % GEL mucosal gel Take by mouth 2 times daily as needed for Pain      cloNIDine (CATAPRES) 0.1 MG tablet TAKE 1/2 TABLET BY MOUTH DAILY FOR HIGH BLOOD PRESSURE      levothyroxine (SYNTHROID) 125 MCG tablet TAKE 1 TABLET BY MOUTH DAILY FOR THYROID  Qty: 90 tablet, Refills: 1      atorvastatin (LIPITOR) 40 MG tablet TAKE 1/2 TABLET BY MOUTH DAILY FOR HIGH CHOLESTEROL  Qty: 45 tablet, Refills: 1      metoprolol succinate (TOPROL XL) 50 MG extended release tablet TAKE 1 TABLET BY MOUTH EVERY DAY FOR HIGH BLOOD PRESSURE  Qty: 90 tablet, Refills: 1    Associated Diagnoses: Essential hypertension      Multiple Vitamins-Minerals (THERAPEUTIC MULTIVITAMIN-MINERALS) tablet Take 1 tablet by mouth daily      anastrozole (ARIMIDEX) 1 MG tablet TK 1 T PO QD  Refills: 4             Time Spent on discharge is more than 45 minutes in the examination, evaluation, counseling and review of medications and discharge plan. Signed:    LONG Owens - CNP   11/5/2021      Thank you Robyne Cabot, MD for the opportunity to be involved in this patient's care. If you have any questions or concerns please feel free to contact me at [phone].

## 2021-11-05 NOTE — CARE COORDINATION
CASE MANAGEMENT DISCHARGE SUMMARY    Discharge to: [facility] Exemption Notification (HENS) completed:  NA return to skilled     IMM given: (date) 11/05/2021    Transportation: Medical Transport explained to pt/family. Pt/family voice no agency preference. Agency used: [facility] care    time: 1700   Ambulance form completed: Yes    Confirmed discharge plan with: Patient, VM left with dtr for return call, [facility]    Facility/Agency, name:[facility] faxed [phone]    Phone number for report to facility: [phone]    RN, name: Desiree Wright RN     Note: Discharging nurse to complete WALI, reconcile AVS, and place final copy with patient's discharge packet. RN to ensure that written prescriptions for  Level II medications are sent with patient to the facility as per protocol.     TED Greco

## 2021-11-05 NOTE — PROGRESS NOTES
Progress Note  Date:2021       Room:[facility]-  Patient Name:Anastasiia Weeks     YOB: 1940     Age:81 y.o. Subjective    Subjective:  Symptoms:  Stable. Pain:  She reports no pain. Review of Systems  Objective         Vitals Last 24 Hours:  TEMPERATURE:  Temp  Av.1 °F (36.7 °C)  Min: 97.6 °F (36.4 °C)  Max: 98.6 °F (37 °C)  RESPIRATIONS RANGE: Resp  Av  Min: 16  Max: 16  PULSE OXIMETRY RANGE: SpO2  Av.3 %  Min: 97 %  Max: 100 %  PULSE RANGE: Pulse  Av.8  Min: 85  Max: 109  BLOOD PRESSURE RANGE: Systolic (37MHA), GS , Min:126 , JWT:644   ; Diastolic (80VWY), AIJ:98, Min:46, Max:81    I/O (24Hr): Intake/Output Summary (Last 24 hours) at 2021 1002  Last data filed at 2021 0545  Gross per 24 hour   Intake 540 ml   Output 725 ml   Net -185 ml     Objective:  General Appearance:  Not in pain and in no acute distress. Vital signs: (most recent): Blood pressure (!) 145/76, pulse 86, temperature 97.9 °F (36.6 °C), temperature source Oral, resp. rate 16, height 5' 3\" (1.6 m), weight 172 lb 5 oz (78.2 kg), SpO2 97 %. Abdomen: Abdomen is soft. Bowel sounds are normal.   There is no abdominal tenderness. Labs/Imaging/Diagnostics    Labs:  CBC:  Recent Labs     21  0633 21  1502 21  0255 21  1947 21  0536   WBC 18.4*  --  14.9*  --  8.5   RBC 2.68*  --  2.48*  --  2.41*   HGB 8.9*   < > 8.2* 9.0* 8.3*   HCT 26.2*   < > 24.1* 27.3* 24.1*   MCV 97.5  --  97.4  --  100.0   RDW 21.4*  --  22.1*  --  23.5*     --  148  --  101*    < > = values in this interval not displayed.      CHEMISTRIES:  Recent Labs     21  1210 21  1210 21  0634 21  0255 21  0536      < > 140 136 138   K 3.5   < > 3.3* 3.6 3.6   CL 99   < > 104 102 104   CO2 24   < > 27 24 25   BUN 50*   < > 46* 30* 18   CREATININE 0.8   < > 0.7 0.6 0.6   GLUCOSE 153*   < > 114* 107* 106*   MG 1.90  --  2.00  --   -- < > = values in this interval not displayed. PT/INR:  Recent Labs     11/02/21  1233   PROTIME 14.1*   INR 1.24*     APTT:  Recent Labs     11/02/21  1233   APTT 26.6     LIVER PROFILE:  Recent Labs     11/02/21  1210   AST 45*   ALT 49*   BILITOT 0.3   ALKPHOS 75       Imaging Last 24 Hours:  No results found. Assessment//Plan           Hospital Problems         Last Modified POA    GIB (gastrointestinal bleeding) 11/2/2021 Yes        Assessment & Plan  79 yo w HTN, arthritis, CAD admitted for melena and profound anemia on ASA and Meloxicam. H/H 5.7/17. EGD is neg.     Plan:   1. Supportive care and transfuse as appropriate  2.  OK to D/C home for outpatient colonoscopy +/- Capsule Endoscopy     Diego Burns MD       (O) [phone]    Electronically signed by Diego Burns MD on 11/5/21 at 10:02 AM EDT

## 2021-11-15 ENCOUNTER — TELEPHONE (OUTPATIENT)
Dept: INTERNAL MEDICINE CLINIC | Age: 81
End: 2021-11-15

## 2021-11-15 NOTE — TELEPHONE ENCOUNTER
Mrs. Rosana Sanderson is being D/C to home on 11/15/21. She will need home Nursing and P.T. Will you call in verbal orders for both?   Please call Theresa Angel at [facility] at [phone]

## 2021-11-16 ENCOUNTER — CARE COORDINATION (OUTPATIENT)
Dept: CARE COORDINATION | Age: 81
End: 2021-11-16

## 2021-11-16 ENCOUNTER — OFFICE VISIT (OUTPATIENT)
Dept: CARDIOLOGY CLINIC | Age: 81
End: 2021-11-16
Payer: MEDICARE

## 2021-11-16 ENCOUNTER — CARE COORDINATION (OUTPATIENT)
Dept: CASE MANAGEMENT | Age: 81
End: 2021-11-16

## 2021-11-16 VITALS
BODY MASS INDEX: 31.71 KG/M2 | HEART RATE: 79 BPM | WEIGHT: 179 LBS | SYSTOLIC BLOOD PRESSURE: 120 MMHG | OXYGEN SATURATION: 98 % | DIASTOLIC BLOOD PRESSURE: 62 MMHG | HEIGHT: 63 IN

## 2021-11-16 DIAGNOSIS — I10 HYPERTENSION, UNSPECIFIED TYPE: ICD-10-CM

## 2021-11-16 DIAGNOSIS — E78.2 MIXED HYPERLIPIDEMIA: ICD-10-CM

## 2021-11-16 DIAGNOSIS — I25.10 ASHD (ARTERIOSCLEROTIC HEART DISEASE): Primary | ICD-10-CM

## 2021-11-16 DIAGNOSIS — I50.32 CHRONIC DIASTOLIC HF (HEART FAILURE) (HCC): ICD-10-CM

## 2021-11-16 PROCEDURE — 1123F ACP DISCUSS/DSCN MKR DOCD: CPT | Performed by: INTERNAL MEDICINE

## 2021-11-16 PROCEDURE — G8417 CALC BMI ABV UP PARAM F/U: HCPCS | Performed by: INTERNAL MEDICINE

## 2021-11-16 PROCEDURE — G8399 PT W/DXA RESULTS DOCUMENT: HCPCS | Performed by: INTERNAL MEDICINE

## 2021-11-16 PROCEDURE — 99214 OFFICE O/P EST MOD 30 MIN: CPT | Performed by: INTERNAL MEDICINE

## 2021-11-16 PROCEDURE — G8427 DOCREV CUR MEDS BY ELIG CLIN: HCPCS | Performed by: INTERNAL MEDICINE

## 2021-11-16 PROCEDURE — 1090F PRES/ABSN URINE INCON ASSESS: CPT | Performed by: INTERNAL MEDICINE

## 2021-11-16 PROCEDURE — 4040F PNEUMOC VAC/ADMIN/RCVD: CPT | Performed by: INTERNAL MEDICINE

## 2021-11-16 PROCEDURE — 1036F TOBACCO NON-USER: CPT | Performed by: INTERNAL MEDICINE

## 2021-11-16 PROCEDURE — 1111F DSCHRG MED/CURRENT MED MERGE: CPT | Performed by: INTERNAL MEDICINE

## 2021-11-16 PROCEDURE — G8484 FLU IMMUNIZE NO ADMIN: HCPCS | Performed by: INTERNAL MEDICINE

## 2021-11-16 RX ORDER — TORSEMIDE 20 MG/1
20 TABLET ORAL DAILY
COMMUNITY
End: 2021-11-19

## 2021-11-16 RX ORDER — ACETAMINOPHEN 325 MG/1
650 TABLET ORAL EVERY 6 HOURS PRN
COMMUNITY

## 2021-11-16 RX ORDER — POTASSIUM CHLORIDE 20 MEQ/1
20 TABLET, EXTENDED RELEASE ORAL 2 TIMES DAILY
Qty: 30 TABLET | Refills: 3 | Status: SHIPPED | OUTPATIENT
Start: 2021-11-16 | End: 2022-11-03

## 2021-11-16 RX ORDER — TORSEMIDE 10 MG/1
30 TABLET ORAL DAILY
Qty: 30 TABLET | Refills: 3 | Status: SHIPPED | OUTPATIENT
Start: 2021-11-16 | End: 2021-11-19

## 2021-11-16 NOTE — CARE COORDINATION
[facility] Transitions Initial Follow Up Call    Call within 2 business days of discharge: Yes    Patient: Tanisha Batista Patient : 1940   MRN: [id number]  Reason for Admission: NSTEMI  Discharge Date: 21 RARS: Readmission Risk Score: 20.2      Last Discharge [facility]       Complaint Diagnosis Description Type Department Provider    21 Rectal Bleeding; Nausea UGIB (upper gastrointestinal bleed) . .. ED to Hosp-Admission (Discharged) (ADMITTED) Leonela Dunlap MD; Keysha Pro... Acute Care Course:  10/14 to 10/17 [facility] NSTEMI  10/18 to 10/21 [facility] NSTEMI   to  [facility] UGIB/ UTI  [facility]  to 11/15 for 10 days with d/c home with [facility] with [facility] 21    Sig Hx:   HTN, hypothyroid, OA legs, DDD, gout, edema, fatty liver, bilat MCKAY, breast CA '19    DME:     Conversation:  Confirmed HHC and no call placed as pt due to be at PCP f/u soon.     Follow up plan:  Call tomorrow         Care Transitions 24 Hour Call    Have you scheduled your follow up appointment?: Yes  Care Transitions Interventions         Follow Up  Future Appointments   Date Time Provider Keaton Cedillo   2021  2:00 PM LONG Hurt CNP [facility]   2021  2:20 PM LONG [place] [facility] [address] Jade, RN   [employer] [facility]/ [facility] Transition Nurse  [phone]

## 2021-11-16 NOTE — PROGRESS NOTES
[address] [facility]  [phone]      Attending Physician: No att. providers found  Reason for Consultation/Chief Complaint: follow up from cardiac cath    Subjective   History of Present Illness:  Parminder Dove is a 78 y.o. patient who presents today for follow up for cardiac catheterization on 10/14/21. Cath testing from that day showed mild CAD/ASHD, and should continue to treat medically with aspirin, statin, and beta-blocker. ECHO testing from 10/14/21 showed an EF of 55-60%. No regional wall abnormalities are seen. No significant valvular disease within the limits of the study. Today she reports that she has been having edema of the lower extremities. She is taking torsemide 20 mg tab daily for management of edema. She denies chest pain, sob, dizziness and syncope. Past Medical History:   has a past medical history of Breast cancer ([facility] [address] Cancer ([facility] [address] Edema, Fatty liver, Gout, HIGH CHOLESTEROL, HTN (hypertension), Hypothyroidism, IFG (impaired fasting glucose), Osteoarthritis, Palpitations, and Pyelonephritis. Surgical History:   has a past surgical history that includes Thyroidectomy (1968); Tonsillectomy (1945); Appendectomy (1954); Cholecystectomy (12/1970); Hysterectomy (5/1971); eye surgery (Right, 2007); joint replacement (Left, 2001); joint replacement (Right, 2002); Cataract removal with implant (Left, [id number]); other surgical history (Right, 09/27/2017); epidural steroid injection (Right, 11/12/2019); Breast surgery; and Upper gastrointestinal endoscopy (N/A, 11/4/2021). Social History:   reports that she has never smoked. She has never used smokeless tobacco. She reports current alcohol use of about 5.0 - 6.0 standard drinks of alcohol per week. She reports that she does not use drugs. Family History:  family history includes Alcohol Abuse in her sister; Cancer in her brother and mother; Other in her mother.       Home Medications:  Were reviewed and are listed in nursing record and/or below  Prior to Admission medications    Medication Sig Start Date End Date Taking? Authorizing Provider   torsemide (DEMADEX) 20 MG tablet Take 20 mg by mouth daily   Yes Historical Provider, MD   acetaminophen (TYLENOL) 325 MG tablet Take 650 mg by mouth every 6 hours as needed for Pain   Yes Historical Provider, MD   potassium chloride (MICRO-K) 10 MEQ extended release capsule Take 10 mEq by mouth 2 times daily   Yes Historical Provider, MD   benzocaine (ORAJEL) 20 % GEL mucosal gel Take by mouth 2 times daily as needed for Pain   Yes Historical Provider, MD   cloNIDine (CATAPRES) 0.1 MG tablet TAKE 1/2 TABLET BY MOUTH DAILY FOR HIGH BLOOD PRESSURE 1/21/21  Yes Historical Provider, MD   levothyroxine (SYNTHROID) 125 MCG tablet TAKE 1 TABLET BY MOUTH DAILY FOR THYROID 10/13/21  Yes LONG Brown CNP   atorvastatin (LIPITOR) 40 MG tablet TAKE 1/2 TABLET BY MOUTH DAILY FOR HIGH CHOLESTEROL 9/7/21  Yes Fe Pitts MD   metoprolol succinate (TOPROL XL) 50 MG extended release tablet TAKE 1 TABLET BY MOUTH EVERY DAY FOR HIGH BLOOD PRESSURE 5/13/21  Yes Fritz Pitts MD   Multiple Vitamins-Minerals (THERAPEUTIC MULTIVITAMIN-MINERALS) tablet Take 1 tablet by mouth daily   Yes Historical Provider, MD   anastrozole (ARIMIDEX) 1 MG tablet TK 1 T PO QD 2/9/18  Yes Historical Provider, MD        CURRENT Medications:  No current facility-administered medications for this visit. Allergies:  Lisinopril and Vasotec     Review of Systems:   A 14 point review of symptoms completed. Pertinent positives identified in the HPI, all other review of symptoms negative as below.       Objective   PHYSICAL EXAM:    Vitals:    11/16/21 1349   BP: 120/62   Pulse: 79   SpO2: 98%    Weight: 179 lb (81.2 kg)         Gen: Patient in NAD, resting comfortably  Neck: No JVD   Respiratory: CTAB no WRR  Chest: normal without deformity  Cardiovascular:RRR, S1S2,2/6 sm  Abdomen: Soft, NTND, Normal BS Extremities: +1 to +2 bilat le edema   Neurological/Psychiatric: AxO x4, No gross motors/sensory deficits  Skin:  Warm and dry      Labs   CBC:   Lab Results   Component Value Date    WBC 8.5 2021    RBC 2.41 2021    HGB 8.3 2021    HCT 24.1 2021    .0 2021    RDW 23.5 2021     2021     CMP:  Lab Results   Component Value Date     2021    K 3.6 2021     2021    CO2 25 2021    BUN 18 2021    CREATININE 0.6 2021    GFRAA >60 2021    GFRAA >60 2012    AGRATIO 1.5 2021    LABGLOM >60 2021    GLUCOSE 106 2021    PROT 4.9 2021    PROT 7.3 2012    CALCIUM 8.2 2021    BILITOT 0.3 2021    ALKPHOS 75 2021    AST 45 2021    ALT 49 2021     PT/INR:  No results found for: PTINR  HgBA1c:  Lab Results   Component Value Date    LABA1C 4.9 2021     Lab Results   Component Value Date    TROPONINI <0.01 2021         Cardiac Data     Last EK21  NSR, HR 81        Echo: 10/14/21  Summary   Technically difficult examination due to body habitus. Definity® used for   myocardial border enhancement. Normal left ventricle size, wall thickness, and systolic function with a   visually estimated ejection fraction of 55-60%. No regional wall motion abnormalities are seen. Normal left ventricular diastolic filling pressure. Normal right ventricular size and function. No significant valvular disease within the limits of the study. Systolic pulmonary artery pressure (SPAP) is normal and estimated at 26 mmHg   (right atrial pressure 3 mmHg). No prior studies for comparison.            Stress Test:    Cath: 10/14/21  FINDINGS         Left heart catheterization     LVEDP  9   GRADIENT ACROSS AORTIC VALVE  none            CORONARY ARTERIES     LM Less than 10% sbfwswxa-zyn-rymvbc stenosis            LAD  Moderately calcified, 20% proximal/mid-distal stenosis. D1 is a medium to large size vessel with ostial 40% stenosis. LCX  Ostial proximal 20 to 30% stenosis, mid-distal less than 10% stenosis. RI  Could also be described as D1, see above for D1 details. RCA Mildly calcified, dominant, bnnegfmw-fyn-nzfhlq 20% stenosis. CONCLUSIONS:      Mild CAD/ASHD, continue to treat medically with aspirin, statin, beta-blocker  No further inpatient cardiac work-up or treatment is planned, will sign off, please call with questions. Studies:       I have reviewed labs and imaging/xray/diagnostic testing in this note. Assessment and Plan          Patient Active Problem List   Diagnosis    Hypothyroidism    HTN (hypertension)    Gout    Arthritis    Palpitations    Edema    Elevated LFTs    IFG (impaired fasting glucose)    Fatty liver    Pyelonephritis    Localized osteoarthrosis, lower leg    DDD (degenerative disc disease), lumbar    Hyperlipidemia    Cataract    Sacroiliitis (HCC)    Hip pain    Peripheral edema    History of bilateral total hip arthroplasty    Spinal stenosis of lumbar region    Thrombocytopenia (HCC)    Chest pain    NSTEMI (non-ST elevated myocardial infarction) (HCC)    Orthostatic lightheadedness    UTI (urinary tract infection)    GIB (gastrointestinal bleeding)       A:    htn  Cad/ashd  Chronic diast hf   sob    PLAN:  1. See pulmonology to assess sob  2. Increase torsemide dose to 30 mg daily. Take potassium 20 mg 2 times a day. 3. Follow up with NP in 2 months            Scribe's attestation: This note was scribed in the presence of Dr. Lilian Linares MD   by Nataly Sharp LPN    I, Dr Lilian Linares, personally performed the services described in this documentation, as scribed by the above signed scribe in my presence. It is both accurate and complete to my knowledge.   I agree with the details independently gathered by the clinical support staff and the

## 2021-11-16 NOTE — CARE COORDINATION
contacted [facility] and spoke to Rosa who states patient was discharge home and homecare was set up with [facility].. Contacted [facility] and spoke to Mireya who confirmed they have referral for patient and states SOC is scheduled for 11/18/21 as requested by the patient.

## 2021-11-16 NOTE — LETTER
Jim Molina  1940      [address] [facility]  [phone]      Attending Physician: No att. providers found  Reason for Consultation/Chief Complaint: follow up from cardiac cath    Subjective   History of Present Illness:  Jim Molina is a 78 y.o. patient who presents today for follow up for cardiac catheterization on 10/14/21. Cath testing from that day showed mild CAD/ASHD, and should continue to treat medically with aspirin, statin, and beta-blocker. ECHO testing from 10/14/21 showed an EF of 55-60%. No regional wall abnormalities are seen. No significant valvular disease within the limits of the study. Today she reports that she has been having edema of the lower extremities. She is taking torsemide 20 mg tab daily for management of edema. She denies chest pain, sob, dizziness and syncope. Past Medical History:   has a past medical history of Breast cancer ([facility] [address] Cancer ([facility] [address] Edema, Fatty liver, Gout, HIGH CHOLESTEROL, HTN (hypertension), Hypothyroidism, IFG (impaired fasting glucose), Osteoarthritis, Palpitations, and Pyelonephritis. Surgical History:   has a past surgical history that includes Thyroidectomy (1968); Tonsillectomy (1945); Appendectomy (1954); Cholecystectomy (12/1970); Hysterectomy (5/1971); eye surgery (Right, 2007); joint replacement (Left, 2001); joint replacement (Right, 2002); Cataract removal with implant (Left, [id number]); other surgical history (Right, 09/27/2017); epidural steroid injection (Right, 11/12/2019); Breast surgery; and Upper gastrointestinal endoscopy (N/A, 11/4/2021). Social History:   reports that she has never smoked. She has never used smokeless tobacco. She reports current alcohol use of about 5.0 - 6.0 standard drinks of alcohol per week. She reports that she does not use drugs. Family History:  family history includes Alcohol Abuse in her sister; Cancer in her brother and mother; Other in her mother.       Home Medications:  Were reviewed and are listed in nursing record and/or below  Prior to Admission medications    Medication Sig Start Date End Date Taking? Authorizing Provider   torsemide (DEMADEX) 20 MG tablet Take 20 mg by mouth daily   Yes Historical Provider, MD   acetaminophen (TYLENOL) 325 MG tablet Take 650 mg by mouth every 6 hours as needed for Pain   Yes Historical Provider, MD   potassium chloride (MICRO-K) 10 MEQ extended release capsule Take 10 mEq by mouth 2 times daily   Yes Historical Provider, MD   benzocaine (ORAJEL) 20 % GEL mucosal gel Take by mouth 2 times daily as needed for Pain   Yes Historical Provider, MD   cloNIDine (CATAPRES) 0.1 MG tablet TAKE 1/2 TABLET BY MOUTH DAILY FOR HIGH BLOOD PRESSURE 1/21/21  Yes Historical Provider, MD   levothyroxine (SYNTHROID) 125 MCG tablet TAKE 1 TABLET BY MOUTH DAILY FOR THYROID 10/13/21  Yes LONG Vasquez CNP   atorvastatin (LIPITOR) 40 MG tablet TAKE 1/2 TABLET BY MOUTH DAILY FOR HIGH CHOLESTEROL 9/7/21  Yes Donovan Leonard A Day, MD   metoprolol succinate (TOPROL XL) 50 MG extended release tablet TAKE 1 TABLET BY MOUTH EVERY DAY FOR HIGH BLOOD PRESSURE 5/13/21  Yes Lu Pitts MD   Multiple Vitamins-Minerals (THERAPEUTIC MULTIVITAMIN-MINERALS) tablet Take 1 tablet by mouth daily   Yes Historical Provider, MD   anastrozole (ARIMIDEX) 1 MG tablet TK 1 T PO QD 2/9/18  Yes Historical Provider, MD        CURRENT Medications:  No current facility-administered medications for this visit. Allergies:  Lisinopril and Vasotec     Review of Systems:   A 14 point review of symptoms completed. Pertinent positives identified in the HPI, all other review of symptoms negative as below.       Objective   PHYSICAL EXAM:    Vitals:    11/16/21 1349   BP: 120/62   Pulse: 79   SpO2: 98%    Weight: 179 lb (81.2 kg)         Gen: Patient in NAD, resting comfortably  Neck: No JVD   Respiratory: CTAB no WRR  Chest: normal without deformity  Cardiovascular:RRR, S1S2,2/6 sm  Abdomen: Soft, NTND, Normal BS    Extremities: +1 to +2 bilat le edema   Neurological/Psychiatric: AxO x4, No gross motors/sensory deficits  Skin:  Warm and dry      Labs   CBC:   Lab Results   Component Value Date    WBC 8.5 2021    RBC 2.41 2021    HGB 8.3 2021    HCT 24.1 2021    .0 2021    RDW 23.5 2021     2021     CMP:  Lab Results   Component Value Date     2021    K 3.6 2021     2021    CO2 25 2021    BUN 18 2021    CREATININE 0.6 2021    GFRAA >60 2021    GFRAA >60 2012    AGRATIO 1.5 2021    LABGLOM >60 2021    GLUCOSE 106 2021    PROT 4.9 2021    PROT 7.3 2012    CALCIUM 8.2 2021    BILITOT 0.3 2021    ALKPHOS 75 2021    AST 45 2021    ALT 49 2021     PT/INR:  No results found for: PTINR  HgBA1c:  Lab Results   Component Value Date    LABA1C 4.9 2021     Lab Results   Component Value Date    TROPONINI <0.01 2021         Cardiac Data     Last EK21  NSR, HR 81        Echo: 10/14/21  Summary   Technically difficult examination due to body habitus. Definity® used for   myocardial border enhancement. Normal left ventricle size, wall thickness, and systolic function with a   visually estimated ejection fraction of 55-60%. No regional wall motion abnormalities are seen. Normal left ventricular diastolic filling pressure. Normal right ventricular size and function. No significant valvular disease within the limits of the study. Systolic pulmonary artery pressure (SPAP) is normal and estimated at 26 mmHg   (right atrial pressure 3 mmHg). No prior studies for comparison.            Stress Test:    Cath: 10/14/21  FINDINGS         Left heart catheterization     LVEDP  9   GRADIENT ACROSS AORTIC VALVE  none            CORONARY ARTERIES     LM Less than 10% proximalmiddistal stenosis            LAD Moderately calcified, 20% proximal/middistal stenosis. D1 is a medium to large size vessel with ostial 40% stenosis. LCX  Ostial proximal 20 to 30% stenosis, middistal less than 10% stenosis. RI  Could also be described as D1, see above for D1 details. RCA Mildly calcified, dominant, proximalmiddistal 20% stenosis. CONCLUSIONS:      Mild CAD/ASHD, continue to treat medically with aspirin, statin, beta-blocker  No further inpatient cardiac work-up or treatment is planned, will sign off, please call with questions. Studies:       I have reviewed labs and imaging/xray/diagnostic testing in this note. Assessment and Plan          Patient Active Problem List   Diagnosis    Hypothyroidism    HTN (hypertension)    Gout    Arthritis    Palpitations    Edema    Elevated LFTs    IFG (impaired fasting glucose)    Fatty liver    Pyelonephritis    Localized osteoarthrosis, lower leg    DDD (degenerative disc disease), lumbar    Hyperlipidemia    Cataract    Sacroiliitis (HCC)    Hip pain    Peripheral edema    History of bilateral total hip arthroplasty    Spinal stenosis of lumbar region    Thrombocytopenia (HCC)    Chest pain    NSTEMI (non-ST elevated myocardial infarction) (HCC)    Orthostatic lightheadedness    UTI (urinary tract infection)    GIB (gastrointestinal bleeding)       A:    htn  Cad/ashd  Chronic diast hf   sob    PLAN:  1. See pulmonology to assess sob  2. Increase torsemide dose to 30 mg daily. Take potassium 20 mg 2 times a day. 3. Follow up with NP in 2 months            Scribe's attestation: This note was scribed in the presence of Dr. Irais López MD   by Jonathan Clark LPN    I, Dr Irais López, personally performed the services described in this documentation, as scribed by the above signed scribe in my presence. It is both accurate and complete to my knowledge.   I agree with the details independently gathered by the clinical support staff and the scribed note accurately describes my personal service to the patient. Thank you for allowing us to participate in the care of Terrie Joshi. Please call me with any questions [phone].     Chicho Charles MD, [facility]   Interventional Cardiologist  [address]  [phone] [facility]  [phone] [address] [place] [address]  11/16/2021 1:57 PM

## 2021-11-16 NOTE — PATIENT INSTRUCTIONS
1. See pulmonology to assess medications and adjustments that may need to be done. 2. Increase torsemide dose to 30 mg daily. Take potassium 20 mg 2 times a day.   3. Follow up with NP in 2 months

## 2021-11-18 ENCOUNTER — CARE COORDINATION (OUTPATIENT)
Dept: CASE MANAGEMENT | Age: 81
End: 2021-11-18

## 2021-11-18 NOTE — CARE COORDINATION
[facility] Transitions Initial Follow Up Call    Call within 2 business days of discharge: Yes    Patient: Parminder Dove Patient : 1940   MRN: [id number]  Reason for Admission: NSTEMI  Discharge Date: 21 RARS: Readmission Risk Score: 20.2      Last Discharge [facility]       Complaint Diagnosis Description Type Department Provider    21 Rectal Bleeding; Nausea UGIB (upper gastrointestinal bleed) . .. ED to Hosp-Admission (Discharged) (ADMITTED) Juliette Rico MD; Loyde Sever... Acute Care Course:  10/14 to 10/17 [facility] NSTEMI  10/18 to 10/21 [facility] NSTEMI   to  [facility] UGIB/ UTI  [facility]  to 11/15 for 10 days with d/c home with [facility] with [facility] 21     Sig Hx:   HTN, hypothyroid, OA legs, DDD, gout, edema, fatty liver, bilat MCKAY, breast CA      DME: 2 walkers, raised toilet seat, transport chair quad cane     Conversation:  Patient states she is doing well. Denies sob, cp, tightness, n/v, bowel or bladder. No s/s of  Melena. Patient states she has BLE edema. Patient seen by cardiology . Torsemide increased to 30 mg daily and Potassium 20mg twice daily. Medications reconciled with cardiology. Patient states she wraps her legs and elevates when sitting. Patient states she was referred to Pulmonary to address sob. Patient states she was winded walking down the rai to get to the office. Appointment scheduled in February. Patient states she is able to bath and dress self. She ambulates independently using a walker. PCP f/u scheduled for . [facility] made a visit today. .     Follow up plan:       Non-face-to-face services provided:      Care Transitions 24 Hour Call    Do you have any ongoing symptoms?: No  Do you have a copy of your discharge instructions?: Yes  Do you have all of your prescriptions and are they filled?: Yes  Have you been contacted by a [facility] Pharmacist?: No  Have you scheduled your follow up appointment?: was performed with family, who verbalizes understanding of administration of home medications. Advised obtaining a 90-day supply of all daily and as-needed medications. Covid Risk Education     Educated patient about risk for severe COVID-19 due to risk factors according to CDC guidelines. CTN reviewed discharge instructions, medical action plan and red flag symptoms with the patient who verbalized understanding. Discussed COVID vaccination status: Yes. Education provided on COVID-19 vaccination as appropriate. Discussed exposure protocols and quarantine with CDC Guidelines. Patient was given an opportunity to verbalize any questions and concerns and agrees to contact CTN or health care provider for questions related to their healthcare. Reviewed and educated patient on any new and changed medications related to discharge diagnosis. Was patient discharged with a pulse oximeter? Yes Discussed and confirmed pulse oximeter discharge instructions and when to notify provider or seek emergency care. CTN provided contact information. Plan for follow-up call in 3-5 days based on severity of symptoms and risk factors.   Plan for next call: symptom management-GENEVIEVE Parikh RN

## 2021-11-19 ENCOUNTER — TELEPHONE (OUTPATIENT)
Dept: INTERNAL MEDICINE CLINIC | Age: 81
End: 2021-11-19

## 2021-11-19 ENCOUNTER — OFFICE VISIT (OUTPATIENT)
Dept: INTERNAL MEDICINE CLINIC | Age: 81
End: 2021-11-19
Payer: MEDICARE

## 2021-11-19 VITALS
BODY MASS INDEX: 31.71 KG/M2 | OXYGEN SATURATION: 100 % | DIASTOLIC BLOOD PRESSURE: 80 MMHG | SYSTOLIC BLOOD PRESSURE: 124 MMHG | WEIGHT: 179 LBS | HEART RATE: 74 BPM | TEMPERATURE: 97 F

## 2021-11-19 DIAGNOSIS — D50.0 BLOOD LOSS ANEMIA: Primary | ICD-10-CM

## 2021-11-19 DIAGNOSIS — R60.9 EDEMA, UNSPECIFIED TYPE: ICD-10-CM

## 2021-11-19 DIAGNOSIS — I21.4 NSTEMI (NON-ST ELEVATED MYOCARDIAL INFARCTION) (HCC): ICD-10-CM

## 2021-11-19 PROBLEM — N39.0 UTI (URINARY TRACT INFECTION): Status: RESOLVED | Noted: 2021-10-18 | Resolved: 2021-11-19

## 2021-11-19 PROCEDURE — 99495 TRANSJ CARE MGMT MOD F2F 14D: CPT | Performed by: NURSE PRACTITIONER

## 2021-11-19 PROCEDURE — 1111F DSCHRG MED/CURRENT MED MERGE: CPT | Performed by: NURSE PRACTITIONER

## 2021-11-19 RX ORDER — TORSEMIDE 20 MG/1
40 TABLET ORAL DAILY
Qty: 30 TABLET | Refills: 0 | Status: SHIPPED | OUTPATIENT
Start: 2021-11-19 | End: 2022-11-03

## 2021-11-19 ASSESSMENT — PATIENT HEALTH QUESTIONNAIRE - PHQ9
SUM OF ALL RESPONSES TO PHQ QUESTIONS 1-9: 0
1. LITTLE INTEREST OR PLEASURE IN DOING THINGS: 0
SUM OF ALL RESPONSES TO PHQ9 QUESTIONS 1 & 2: 0
2. FEELING DOWN, DEPRESSED OR HOPELESS: 0
SUM OF ALL RESPONSES TO PHQ QUESTIONS 1-9: 0
SUM OF ALL RESPONSES TO PHQ QUESTIONS 1-9: 0

## 2021-11-19 ASSESSMENT — ENCOUNTER SYMPTOMS
NAUSEA: 0
CONSTIPATION: 0
VOMITING: 0
DIARRHEA: 0
ABDOMINAL DISTENTION: 0
SHORTNESS OF BREATH: 0
CHEST TIGHTNESS: 0

## 2021-11-19 NOTE — PROGRESS NOTES
Post-Discharge Transitional Care Management Services or Hospital Follow Up      Jim Molina   YOB: 1940    Date of Office Visit:  11/19/2021  Date of Hospital Admission: 11/2/21  Date of Hospital Discharge: 11/5/21  Readmission Risk Score(high >=14%. Medium >=10%):Readmission Risk Score: 20.2      Care management risk score Rising risk (score 2-5) and Complex Care (Scores >=6): 4     Non face to face  following discharge, date last encounter closed (first attempt may have been earlier): 11/18/2021  5:18 PM 11/18/2021  5:18 PM    Call initiated 2 business days of discharge: Yes     Patient Active Problem List   Diagnosis    Hypothyroidism    HTN (hypertension)    Gout    Arthritis    Palpitations    Edema    Elevated LFTs    IFG (impaired fasting glucose)    Fatty liver    Pyelonephritis    Localized osteoarthrosis, lower leg    DDD (degenerative disc disease), lumbar    Hyperlipidemia    Cataract    Sacroiliitis ([address]    Hip pain    Peripheral edema    History of bilateral total hip arthroplasty    Spinal stenosis of lumbar region    Thrombocytopenia ([address]    Chest pain    NSTEMI (non-ST elevated myocardial infarction) ([address]    Orthostatic lightheadedness    UTI (urinary tract infection)    GIB (gastrointestinal bleeding)       Allergies   Allergen Reactions    Lisinopril      Cough.  Vasotec      Cough.          Medications listed as ordered at the time of discharge from hospital  @DISCHARGEMEDSLIST(<NOROUTINE> error)@      Medications marked \"taking\" at this time  Outpatient Medications Marked as Taking for the 11/19/21 encounter (Office Visit) with LONG Mays - CNP   Medication Sig Dispense Refill    torsemide (DEMADEX) 20 MG tablet Take 2 tablets by mouth daily for 15 days 30 tablet 0    acetaminophen (TYLENOL) 325 MG tablet Take 650 mg by mouth every 6 hours as needed for Pain      potassium chloride (KLOR-CON M) 20 MEQ extended release tablet Take 1 tablet by mouth 2 times daily 30 tablet 3    potassium chloride (MICRO-K) 10 MEQ extended release capsule Take 10 mEq by mouth 2 times daily      cloNIDine (CATAPRES) 0.1 MG tablet TAKE 1/2 TABLET BY MOUTH DAILY FOR HIGH BLOOD PRESSURE      levothyroxine (SYNTHROID) 125 MCG tablet TAKE 1 TABLET BY MOUTH DAILY FOR THYROID 90 tablet 1    atorvastatin (LIPITOR) 40 MG tablet TAKE 1/2 TABLET BY MOUTH DAILY FOR HIGH CHOLESTEROL 45 tablet 1    metoprolol succinate (TOPROL XL) 50 MG extended release tablet TAKE 1 TABLET BY MOUTH EVERY DAY FOR HIGH BLOOD PRESSURE 90 tablet 1    Multiple Vitamins-Minerals (THERAPEUTIC MULTIVITAMIN-MINERALS) tablet Take 1 tablet by mouth daily      anastrozole (ARIMIDEX) 1 MG tablet TK 1 T PO QD  4        Medications patient taking as of now reconciled against medications ordered at time of hospital discharge: Yes    Chief Complaint   Patient presents with    Cellulitis    GI Problem     GI Bleed       Ms. Jarrod Huber presents after recent hospitalization and admission to nursing home after noticing severe chest pain, dyspnea on exertion while at home. She presented to Dr. Tika Escoto whom recommend urgent ER eval.     She was discharged to [facility] for [facility]. She states she did well during her admission and medical management. She was found to have an NSTEMI and was treated medically. She was discharged shortly thereafter, however returned with similar symptoms including fatigue, extreme weakness, shortness of breath. She was found to have large GI bleeding on endoscopy found by Dr. Kalina Briggs. She was transfused which replenished H/H and bleeding was stabilized. Currently she states she is doing well, however does describe continued edema of the BLE. Saw Dr. Anne Goodson recently whom recommend diuretics. She has started the new dose of 30 mg torsemide and does note increased urinary output. Denies shortness of breath, chest pain.  States she is performing her daily exercises with home extrasystoles are present. Chest Wall: PMI is not displaced. Pulses: Normal pulses. Radial pulses are 2+ on the right side and 2+ on the left side. Dorsalis pedis pulses are 2+ on the right side and 2+ on the left side. Heart sounds: Normal heart sounds, S1 normal and S2 normal. Heart sounds not distant. No murmur heard. No friction rub. No gallop. No S3 or S4 sounds. Pulmonary:      Effort: Pulmonary effort is normal. No accessory muscle usage or respiratory distress. Breath sounds: Normal breath sounds. No decreased breath sounds, wheezing, rhonchi or rales. Abdominal:      General: Bowel sounds are normal. There is no distension. Palpations: Abdomen is soft. Tenderness: There is no abdominal tenderness. There is no rebound. Musculoskeletal:         General: Normal range of motion. Cervical back: Full passive range of motion without pain, normal range of motion and neck supple. Right lower leg: 3+ Edema present. Left lower leg: 3+ Edema present. Skin:     General: Skin is warm and dry. Neurological:      Mental Status: She is alert. Cranial Nerves: No cranial nerve deficit. Psychiatric:         Speech: Speech normal.         Behavior: Behavior normal.         Thought Content: Thought content normal.         Judgment: Judgment normal.             Assessment/Plan:  1. Blood loss anemia    - CBC Auto Differential; Future  - IRON AND TIBC; Future  - DE DISCHARGE MEDS RECONCILED W/ CURRENT OUTPATIENT MED LIST    Recommend repeat labs to ensure no return bleeding. Continue current plan of care. Avoid aggravating foods, alcohol intake to prevent rebleed. 2. Edema, unspecified type    - BASIC METABOLIC PANEL; Future  - DE DISCHARGE MEDS RECONCILED W/ CURRENT OUTPATIENT MED LIST    Increase torsemide dose with monitoring of renal function. Continue compression of the lower extremities. Normal echo with recent cards visit.  Encouraged decreasing salt and alcohol elimination to prevent fluid retention. She should aim to hydrate with at least 60 oz water throughout the day    3. NSTEMI (non-ST elevated myocardial infarction) ([address]      STable. Continue current HTN regimen.  Encouraged decreasing sodium in the diet, weight loss, and gradual increases in exercise at least 20 minutes daily to further benefit BP      Medical Decision Making: moderate complexity

## 2021-11-19 NOTE — TELEPHONE ENCOUNTER
Gerardo Gandhi with [address] [place] [address] called stating that physical therapy evaluated the Pt yesterday and they are going to see her 1 time a week for 5 weeks.  Just an [place]

## 2021-11-23 LAB
BUN BLDV-MCNC: 23 MG/DL
CALCIUM SERPL-MCNC: 9.7 MG/DL
CHLORIDE BLD-SCNC: 98 MMOL/L
CHOLESTEROL, TOTAL: 111 MG/DL
CHOLESTEROL/HDL RATIO: 0.8
CO2: 33 MMOL/L
CREAT SERPL-MCNC: 0.9 MG/DL
GFR CALCULATED: NORMAL
GLUCOSE BLD-MCNC: 94 MG/DL
HDLC SERPL-MCNC: 48 MG/DL (ref 35–70)
LDL CHOLESTEROL CALCULATED: 37 MG/DL (ref 0–160)
NONHDLC SERPL-MCNC: NORMAL MG/DL
POTASSIUM SERPL-SCNC: 3.8 MMOL/L
SODIUM BLD-SCNC: 141 MMOL/L
TOTAL IRON BINDING CAPACITY: 434
TRIGL SERPL-MCNC: 130 MG/DL
VLDLC SERPL CALC-MCNC: 26 MG/DL

## 2021-11-24 ENCOUNTER — TELEPHONE (OUTPATIENT)
Dept: CARDIOLOGY CLINIC | Age: 81
End: 2021-11-24

## 2021-11-30 ENCOUNTER — CARE COORDINATION (OUTPATIENT)
Dept: CARE COORDINATION | Age: 81
End: 2021-11-30

## 2021-12-02 ENCOUNTER — CARE COORDINATION (OUTPATIENT)
Dept: CARE COORDINATION | Age: 81
End: 2021-12-02

## 2021-12-02 RX ORDER — ATORVASTATIN CALCIUM 40 MG/1
TABLET, FILM COATED ORAL
Qty: 45 TABLET | Refills: 1 | Status: SHIPPED | OUTPATIENT
Start: 2021-12-02 | End: 2022-07-17

## 2021-12-02 SDOH — ECONOMIC STABILITY: FOOD INSECURITY: WITHIN THE PAST 12 MONTHS, THE FOOD YOU BOUGHT JUST DIDN'T LAST AND YOU DIDN'T HAVE MONEY TO GET MORE.: NEVER TRUE

## 2021-12-02 SDOH — HEALTH STABILITY: PHYSICAL HEALTH: ON AVERAGE, HOW MANY MINUTES DO YOU ENGAGE IN EXERCISE AT THIS LEVEL?: 0 MIN

## 2021-12-02 SDOH — ECONOMIC STABILITY: INCOME INSECURITY: HOW HARD IS IT FOR YOU TO PAY FOR THE VERY BASICS LIKE FOOD, HOUSING, MEDICAL CARE, AND HEATING?: NOT HARD AT ALL

## 2021-12-02 SDOH — ECONOMIC STABILITY: TRANSPORTATION INSECURITY
IN THE PAST 12 MONTHS, HAS THE LACK OF TRANSPORTATION KEPT YOU FROM MEDICAL APPOINTMENTS OR FROM GETTING MEDICATIONS?: NO

## 2021-12-02 SDOH — ECONOMIC STABILITY: INCOME INSECURITY: IN THE LAST 12 MONTHS, WAS THERE A TIME WHEN YOU WERE NOT ABLE TO PAY THE MORTGAGE OR RENT ON TIME?: NO

## 2021-12-02 SDOH — ECONOMIC STABILITY: HOUSING INSECURITY: IN THE LAST 12 MONTHS, HOW MANY PLACES HAVE YOU LIVED?: 1

## 2021-12-02 SDOH — ECONOMIC STABILITY: TRANSPORTATION INSECURITY
IN THE PAST 12 MONTHS, HAS LACK OF TRANSPORTATION KEPT YOU FROM MEETINGS, WORK, OR FROM GETTING THINGS NEEDED FOR DAILY LIVING?: NO

## 2021-12-02 SDOH — SOCIAL STABILITY: SOCIAL NETWORK: IN A TYPICAL WEEK, HOW MANY TIMES DO YOU TALK ON THE PHONE WITH FAMILY, FRIENDS, OR NEIGHBORS?: THREE TIMES A WEEK

## 2021-12-02 SDOH — ECONOMIC STABILITY: HOUSING INSECURITY
IN THE LAST 12 MONTHS, WAS THERE A TIME WHEN YOU DID NOT HAVE A STEADY PLACE TO SLEEP OR SLEPT IN A SHELTER (INCLUDING NOW)?: NO

## 2021-12-02 SDOH — ECONOMIC STABILITY: FOOD INSECURITY: WITHIN THE PAST 12 MONTHS, YOU WORRIED THAT YOUR FOOD WOULD RUN OUT BEFORE YOU GOT MONEY TO BUY MORE.: NEVER TRUE

## 2021-12-02 SDOH — HEALTH STABILITY: PHYSICAL HEALTH: ON AVERAGE, HOW MANY DAYS PER WEEK DO YOU ENGAGE IN MODERATE TO STRENUOUS EXERCISE (LIKE A BRISK WALK)?: 0 DAYS

## 2021-12-02 SDOH — SOCIAL STABILITY: SOCIAL NETWORK: HOW OFTEN DO YOU GET TOGETHER WITH FRIENDS OR RELATIVES?: THREE TIMES A WEEK

## 2021-12-02 NOTE — CARE COORDINATION
Initial Assessment     Do you have all of your prescriptions and are they filled?: Yes  Barriers to medication adherence: None  Are you able to afford your medications?: Yes  How often do you have trouble taking your medications the way you have been told to take them?: I always take them as prescribed. Do you have Home O2 Therapy?: No      Ability to seek help/take action for Emergent Urgent situations i.e. fire, crime, inclement weather or health crisis. : Independent  Ability to ambulate to restroom: Independent  Ability handle personal hygeine needs (bathing/dressing/grooming): Independent  Ability to manage Medications: Independent  Ability to prepare Food Preparation: Independent  Ability to maintain home (clean home, laundry): Needs Assistance  Ability to drive and/or has transportation: Independent  Ability to do shopping: Independent  Ability to manage finances: Independent  Is patient able to live independently?: Yes     Current Housing: Private Residence        Per the Fall Risk Screening, did the patient have 2 or more falls or 1 fall with injury in the past year?: No     Frequent urination at night?: No  Do you use rails/bars?: Yes  Do you have a non-slip tub mat?: Yes     Are you experiencing loss of meaning?: No  Are you experiencing loss of hope and peace?: No     Thinking about your patient's physical health needs, are there any symptoms or problems (risk indicators) you are unsure about that require further investigation?: Mild vague physical symptoms or problems; but do not impact on daily life or are not of concern to patient   Are the patients physical health problems impacting on their mental well-being?: No identified areas of concern   Are there any problems with your patients lifestyle behaviors (alcohol, drugs, diet, exercise) that are impacting on physical or mental well-being?: No identified areas of concern   Do you have any other concerns about your patients mental well-being?  How would you rate their severity and impact on the patient?: No identified areas of concern   How would you rate their home environment in terms of safety and stability (including domestic violence, insecure housing, neighbor harassment)?: Consistently safe, supportive, stable, no identified problems   How do daily activities impact on the patient's well-being? (include current or anticipated unemployment, work, caregiving, access to transportation or other): No identified problems or perceived positive benefits   How would you rate their social network (family, work, friends)?: Adequate participation with social networks   How would you rate their financial resources (including ability to afford all required medical care)?: Financially secure, resources adequate, no identified problems   How wells does the patient now understand their health and well-being (symptoms, signs or risk factors) and what they need to do to manage their health?: Reasonable to good understanding and already engages in managing health or is willing to undertake better management   How well do you think your patient can engage in healthcare discussions? (Barriers include language, deafness, aphasia, alcohol or drug problems, learning difficulties, concentration): Clear and open communication, no identified barriers   Do other services need to be involved to help this patient?: Other care/services not required at this time   Are current services involved with this patient well-coordinated? (Include coordination with other services you are now recommendation): All required care/services in place and well-coordinated   Suggested Interventions and Community Resources  Fall Risk Prevention: Not Started Home Health Services: Completed (Comment: [facility] but only for 7 more days.  (12/2/21 trb))   Medi Set or Pill Pack: Declined   Senior Services: Declined   Transportation Services: Completed                  Prior to Admission medications Medication Sig Start Date End Date Taking?  Authorizing Provider   torsemide (DEMADEX) 20 MG tablet Take 2 tablets by mouth daily for 15 days 11/19/21 12/4/21 Yes LONG Greer CNP   acetaminophen (TYLENOL) 325 MG tablet Take 650 mg by mouth every 6 hours as needed for Pain   Yes Historical Provider, MD   potassium chloride (KLOR-CON M) 20 MEQ extended release tablet Take 1 tablet by mouth 2 times daily 11/16/21  Yes Lars Sena MD   cloNIDine (CATAPRES) 0.1 MG tablet TAKE 1/2 TABLET BY MOUTH DAILY FOR HIGH BLOOD PRESSURE 1/21/21  Yes Historical Provider, MD   levothyroxine (SYNTHROID) 125 MCG tablet TAKE 1 TABLET BY MOUTH DAILY FOR THYROID 10/13/21  Yes LONG Greer CNP   atorvastatin (LIPITOR) 40 MG tablet TAKE 1/2 TABLET BY MOUTH DAILY FOR HIGH CHOLESTEROL 9/7/21  Yes Mckenzie Pitts MD   metoprolol succinate (TOPROL XL) 50 MG extended release tablet TAKE 1 TABLET BY MOUTH EVERY DAY FOR HIGH BLOOD PRESSURE 5/13/21  Yes Mckenzie Pitts MD   Multiple Vitamins-Minerals (THERAPEUTIC MULTIVITAMIN-MINERALS) tablet Take 1 tablet by mouth daily   Yes Historical Provider, MD   anastrozole (ARIMIDEX) 1 MG tablet TK 1 T PO QD 2/9/18  Yes Historical Provider, MD       Future Appointments   Date Time Provider Keaton Cedillo   2/7/2022  2:00 PM Hortencia Cotto MD [facility]

## 2021-12-02 NOTE — TELEPHONE ENCOUNTER
Refill request for ATORVASTATIN medication.      Name of Pharmacy- [address] [place] [address]      Last visit - 10/14/21     Pending visit - NONE    Last refill -9/7/21      Medication Contract signed -   Last Oarrs ran-         Additional Comments

## 2021-12-13 ENCOUNTER — CARE COORDINATION (OUTPATIENT)
Dept: CARE COORDINATION | Age: 81
End: 2021-12-13

## 2021-12-14 DIAGNOSIS — I10 ESSENTIAL HYPERTENSION: ICD-10-CM

## 2021-12-14 RX ORDER — METOPROLOL SUCCINATE 50 MG/1
TABLET, EXTENDED RELEASE ORAL
Qty: 90 TABLET | Refills: 1 | Status: SHIPPED | OUTPATIENT
Start: 2021-12-14 | End: 2022-06-10

## 2021-12-14 NOTE — TELEPHONE ENCOUNTER
Refill request for METOPROLOL ER SUCCINATE 50MG TABS medication.      Name of [address]      Last visit - 11-     Pending visit - N/A    Last refill - 8-      Medication Contract signed -   Last Nathanael bob-         Additional Comments

## 2022-01-14 ENCOUNTER — CARE COORDINATION (OUTPATIENT)
Dept: CARE COORDINATION | Age: 82
End: 2022-01-14

## 2022-01-14 NOTE — CARE COORDINATION
ACM attempted outreach. Left message. 2nd missed outreach  Contact information for call back provided. Follow up in 1 week .  Evaluate for discharge from care coordination

## 2022-01-24 ENCOUNTER — CARE COORDINATION (OUTPATIENT)
Dept: CARE COORDINATION | Age: 82
End: 2022-01-24

## 2022-01-24 NOTE — TELEPHONE ENCOUNTER
I recommend them to schedule and assist them with it if needed if they are unable to do so on their own.

## 2022-01-24 NOTE — TELEPHONE ENCOUNTER
Noted.  Are you responsible for setting up hospital follow-up appointments in the office?   Dr. Joaquín Yu

## 2022-01-24 NOTE — CARE COORDINATION
Attempted to reach patient at home and cell number. This is third attempt at outreach, unsuccessful. [facility] plans to complete care coordination at this time.

## 2022-02-02 ENCOUNTER — TELEPHONE (OUTPATIENT)
Dept: PULMONOLOGY | Age: 82
End: 2022-02-02

## 2022-02-02 NOTE — TELEPHONE ENCOUNTER
Cancelled appointment for NPT SOB with Dr Joaquín Mantilla on 2/7/22   We left a VM on 2/1/22/2/2/22 and informed patient appt been cancel of reason Provider no in office and would call back to R/S on a later time

## 2022-02-07 ENCOUNTER — TELEPHONE (OUTPATIENT)
Dept: PULMONOLOGY | Age: 82
End: 2022-02-07

## 2022-04-28 ENCOUNTER — HOSPITAL ENCOUNTER (OUTPATIENT)
Dept: WOMENS IMAGING | Age: 82
Discharge: HOME | End: 2022-04-28
Payer: MEDICARE

## 2022-04-28 VITALS — BODY MASS INDEX: 34.36 KG/M2 | HEIGHT: 60 IN | WEIGHT: 175 LBS

## 2022-04-28 DIAGNOSIS — Z12.31 ENCOUNTER FOR SCREENING MAMMOGRAM FOR BREAST CANCER: ICD-10-CM

## 2022-04-28 PROCEDURE — 77063 BREAST TOMOSYNTHESIS BI: CPT

## 2022-06-10 ENCOUNTER — TELEPHONE (OUTPATIENT)
Dept: INTERNAL MEDICINE CLINIC | Age: 82
End: 2022-06-10

## 2022-06-10 DIAGNOSIS — I10 ESSENTIAL HYPERTENSION: Primary | ICD-10-CM

## 2022-06-10 DIAGNOSIS — I10 ESSENTIAL HYPERTENSION: ICD-10-CM

## 2022-06-10 DIAGNOSIS — D69.6 THROMBOCYTOPENIA (HCC): ICD-10-CM

## 2022-06-10 DIAGNOSIS — E78.41 ELEVATED LIPOPROTEIN(A): ICD-10-CM

## 2022-06-10 DIAGNOSIS — E03.9 HYPOTHYROIDISM, UNSPECIFIED TYPE: ICD-10-CM

## 2022-06-10 RX ORDER — LEVOTHYROXINE SODIUM 0.12 MG/1
TABLET ORAL
Qty: 90 TABLET | Refills: 0 | Status: SHIPPED | OUTPATIENT
Start: 2022-06-10 | End: 2022-09-17

## 2022-06-10 RX ORDER — METOPROLOL SUCCINATE 50 MG/1
TABLET, EXTENDED RELEASE ORAL
Qty: 90 TABLET | Refills: 0 | Status: SHIPPED | OUTPATIENT
Start: 2022-06-10 | End: 2022-09-17

## 2022-06-10 NOTE — TELEPHONE ENCOUNTER
Please call patient. Due for office visit with me. Needs   1. Obtain fasting laboratory studies, ordered in epic, a  days before office visit.   2.  Appointment is available for Monday  Dr. Antony Perrin

## 2022-06-10 NOTE — TELEPHONE ENCOUNTER
I left message on voicemail for patient to call back to schedule an office visit and get fasting labs done before the visit.

## 2022-06-10 NOTE — TELEPHONE ENCOUNTER
Refill request for metoprolol succinate medication. Name of [facility]      Last visit - 11/19/2021     Pending visit - none     Last refill -12/14/2021      Medication Contract signed -PDMP Monitoring:    Last PDMP Tino Gautam as Reviewed [facility]):  Review User Review Instant Review Result          [unfilled]  Urine Drug Screenings (1 yr)    No resulted procedures found.        Medication Contract and Consent for Opioid Use Documents Filed      No documents found                 Last Thang bob-         Additional Comments

## 2022-06-10 NOTE — TELEPHONE ENCOUNTER
Refill request for levothyroxine medication. Name of [facility]- [facility]       Last visit - 10/14/2021     Pending visit - none     Last refill -10/13/2021      Medication Contract signed -PDMP Monitoring:    Last PDMP Marielena ellison Reviewed [facility]):  Review User Review Instant Review Result          [unfilled]  Urine Drug Screenings (1 yr)    No resulted procedures found.        Medication Contract and Consent for Opioid Use Documents Filed      No documents found                 Last Xavi bob-         Additional Comments

## 2022-06-13 NOTE — TELEPHONE ENCOUNTER
I left message on voicemail for patient to call back to schedule an office visit and get fasting labs done before the visit.     2nd call

## 2022-06-14 NOTE — TELEPHONE ENCOUNTER
I left message on voicemail for patient to call back to schedule an office visit and get fasting labs done before the visit. , 3rd call

## 2022-07-14 ENCOUNTER — TELEPHONE (OUTPATIENT)
Dept: INTERNAL MEDICINE CLINIC | Age: 82
End: 2022-07-14

## 2022-07-14 NOTE — TELEPHONE ENCOUNTER
Please call patient. She has not been seen in the office here since 10/21. No laboratory studies since 1021? Chandler Perkins Is she still a patient here?   Dr. Solomon Gustafson

## 2022-07-14 NOTE — TELEPHONE ENCOUNTER
Patient is asking if she can get her handicap placards renewed. She says that she needs a script with a drs signature and why she is handicap. Patient is asking for the papers to be mailed.

## 2022-07-14 NOTE — TELEPHONE ENCOUNTER
Handicap placard script pended. Will need printed and signed. I will then mail to patients home as requested.

## 2022-07-15 NOTE — TELEPHONE ENCOUNTER
Refill request for atorvastatin  medication.      Name of Pharmacy- [facility]       Last visit - 11-     Pending visit - none    Last refill -12-2-2021      Medication Contract signed -   Monster bob-         Additional Comments

## 2022-07-15 NOTE — TELEPHONE ENCOUNTER
Spoke with patient , she states she only likes to come in once a year unless needed. She did just see demetri for low back pain. Allison Lilly could you write patient a handicap placard.      She is aware to schedule to see Dr. Campbell Abdul for follow up or Ni for AWV

## 2022-07-17 RX ORDER — ATORVASTATIN CALCIUM 40 MG/1
TABLET, FILM COATED ORAL
Qty: 45 TABLET | Refills: 1 | Status: SHIPPED | OUTPATIENT
Start: 2022-07-17

## 2022-08-31 ENCOUNTER — TELEPHONE (OUTPATIENT)
Dept: INTERNAL MEDICINE CLINIC | Age: 82
End: 2022-08-31

## 2022-08-31 NOTE — TELEPHONE ENCOUNTER
I tried both phone #'s. One rang a long time, no answer, no voicemail. The other one said call could not be completed as dialed. I was trying to schedule an AWV with LPN.

## 2022-09-08 DIAGNOSIS — I10 ESSENTIAL HYPERTENSION: ICD-10-CM

## 2022-09-08 NOTE — TELEPHONE ENCOUNTER
Refill request for levothyroxine,toprol xl medication.      Name of Pharmacy- [facility]      Last visit - 8/31/22     Pending visit - none    Last refill -6/10/22      Medication Contract signed  Last Radha bob-         Additional Comments

## 2022-09-14 DIAGNOSIS — I10 ESSENTIAL HYPERTENSION: ICD-10-CM

## 2022-09-15 NOTE — TELEPHONE ENCOUNTER
Refill request for toprol,levothyroxine medication.      Name of [facility]acy- [facility]      Last visit - 5/20/22     Pending visit - none    Last refill -6/10/22      Medication Contract signed -   Last Oarrs ran-         Additional Comments

## 2022-09-15 NOTE — TELEPHONE ENCOUNTER
Please call patient. Is she still being seen here in this office?   If not she will need to transfer her prescriptions to new PCP  Dr. Duvall May

## 2022-09-17 ENCOUNTER — TELEPHONE (OUTPATIENT)
Dept: INTERNAL MEDICINE CLINIC | Age: 82
End: 2022-09-17

## 2022-09-17 RX ORDER — LEVOTHYROXINE SODIUM 0.12 MG/1
TABLET ORAL
Qty: 60 TABLET | Refills: 0 | Status: SHIPPED | OUTPATIENT
Start: 2022-09-17 | End: 2022-11-16

## 2022-09-17 RX ORDER — LEVOTHYROXINE SODIUM 0.12 MG/1
TABLET ORAL
Qty: 90 TABLET | Refills: 0 | OUTPATIENT
Start: 2022-09-17

## 2022-09-17 RX ORDER — METOPROLOL SUCCINATE 50 MG/1
TABLET, EXTENDED RELEASE ORAL
Qty: 90 TABLET | Refills: 0 | OUTPATIENT
Start: 2022-09-17

## 2022-09-17 RX ORDER — METOPROLOL SUCCINATE 50 MG/1
TABLET, EXTENDED RELEASE ORAL
Qty: 60 TABLET | Refills: 0 | Status: SHIPPED | OUTPATIENT
Start: 2022-09-17 | End: 2022-11-03 | Stop reason: SDUPTHER

## 2022-09-17 NOTE — TELEPHONE ENCOUNTER
Please call patient. It has been greater than 1 year since last office visit. Past due for fasting laboratory studies, ordered in epic, obtain a few days before office visit with me.   If no office visit: No more refills  Dr. Ivette Kemp

## 2022-10-20 ENCOUNTER — HOSPITAL ENCOUNTER (OUTPATIENT)
Age: 82
Discharge: HOME | End: 2022-10-20
Payer: MEDICARE

## 2022-10-20 DIAGNOSIS — E78.41 ELEVATED LIPOPROTEIN(A): ICD-10-CM

## 2022-10-20 DIAGNOSIS — D69.6 THROMBOCYTOPENIA (HCC): ICD-10-CM

## 2022-10-20 DIAGNOSIS — I10 ESSENTIAL HYPERTENSION: ICD-10-CM

## 2022-10-20 DIAGNOSIS — E03.9 HYPOTHYROIDISM, UNSPECIFIED TYPE: ICD-10-CM

## 2022-10-20 LAB
A/G RATIO: 2.2 (ref 1.1–2.2)
ALBUMIN SERPL-MCNC: 4.8 G/DL (ref 3.4–5)
ALP BLD-CCNC: 138 U/L (ref 40–129)
ALT SERPL-CCNC: 52 U/L (ref 10–40)
ANION GAP SERPL CALCULATED.3IONS-SCNC: 18 MMOL/L (ref 3–16)
AST SERPL-CCNC: 51 U/L (ref 15–37)
BILIRUB SERPL-MCNC: 0.9 MG/DL (ref 0–1)
BUN BLDV-MCNC: 18 MG/DL (ref 7–20)
CALCIUM SERPL-MCNC: 10.4 MG/DL (ref 8.3–10.6)
CHLORIDE BLD-SCNC: 102 MMOL/L (ref 99–110)
CHOLESTEROL, TOTAL: 134 MG/DL (ref 0–199)
CO2: 22 MMOL/L (ref 21–32)
CREAT SERPL-MCNC: 0.8 MG/DL (ref 0.6–1.2)
GFR SERPL CREATININE-BSD FRML MDRD: >60 ML/MIN/{1.73_M2}
GLUCOSE BLD-MCNC: 97 MG/DL (ref 70–99)
HCT VFR BLD CALC: 46.6 % (ref 36–48)
HDLC SERPL-MCNC: 52 MG/DL (ref 40–60)
HEMOGLOBIN: 16.3 G/DL (ref 12–16)
LDL CHOLESTEROL CALCULATED: 42 MG/DL
MCH RBC QN AUTO: 37.4 PG (ref 26–34)
MCHC RBC AUTO-ENTMCNC: 35 G/DL (ref 31–36)
MCV RBC AUTO: 107 FL (ref 80–100)
PDW BLD-RTO: 12.7 % (ref 12.4–15.4)
PLATELET # BLD: 122 K/UL (ref 135–450)
PMV BLD AUTO: 8.5 FL (ref 5–10.5)
POTASSIUM SERPL-SCNC: 3.9 MMOL/L (ref 3.5–5.1)
RBC # BLD: 4.35 M/UL (ref 4–5.2)
SODIUM BLD-SCNC: 142 MMOL/L (ref 136–145)
T4 TOTAL: 10.2 UG/DL (ref 4.5–10.9)
TOTAL PROTEIN: 7 G/DL (ref 6.4–8.2)
TRIGL SERPL-MCNC: 201 MG/DL (ref 0–150)
TSH REFLEX: 0.42 UIU/ML (ref 0.27–4.2)
VLDLC SERPL CALC-MCNC: 40 MG/DL
WBC # BLD: 5.4 K/UL (ref 4–11)

## 2022-10-20 PROCEDURE — 84443 ASSAY THYROID STIM HORMONE: CPT

## 2022-10-20 PROCEDURE — 36415 COLL VENOUS BLD VENIPUNCTURE: CPT

## 2022-10-20 PROCEDURE — 80053 COMPREHEN METABOLIC PANEL: CPT

## 2022-10-20 PROCEDURE — 85027 COMPLETE CBC AUTOMATED: CPT

## 2022-10-20 PROCEDURE — 80061 LIPID PANEL: CPT

## 2022-10-20 PROCEDURE — 84436 ASSAY OF TOTAL THYROXINE: CPT

## 2022-11-01 NOTE — TELEPHONE ENCOUNTER
Left voice mail to call office to schedule an AWV with Ni, MELANIN either in person or by phone.   2nd call

## 2022-11-02 PROBLEM — I50.32 CHRONIC DIASTOLIC HF (HEART FAILURE) (HCC): Status: ACTIVE | Noted: 2022-11-02

## 2022-11-03 ENCOUNTER — OFFICE VISIT (OUTPATIENT)
Dept: INTERNAL MEDICINE CLINIC | Age: 82
End: 2022-11-03
Payer: MEDICARE

## 2022-11-03 VITALS
BODY MASS INDEX: 34.88 KG/M2 | WEIGHT: 178.6 LBS | SYSTOLIC BLOOD PRESSURE: 150 MMHG | OXYGEN SATURATION: 99 % | DIASTOLIC BLOOD PRESSURE: 74 MMHG | HEART RATE: 84 BPM | TEMPERATURE: 97 F

## 2022-11-03 DIAGNOSIS — R53.83 FATIGUE, UNSPECIFIED TYPE: ICD-10-CM

## 2022-11-03 DIAGNOSIS — I10 ESSENTIAL HYPERTENSION: ICD-10-CM

## 2022-11-03 DIAGNOSIS — D69.6 THROMBOCYTOPENIA (HCC): ICD-10-CM

## 2022-11-03 DIAGNOSIS — R07.9 CHEST PAIN, UNSPECIFIED TYPE: Primary | ICD-10-CM

## 2022-11-03 DIAGNOSIS — I50.32 CHRONIC DIASTOLIC HF (HEART FAILURE) (HCC): ICD-10-CM

## 2022-11-03 DIAGNOSIS — D75.89 MACROCYTOSIS: ICD-10-CM

## 2022-11-03 DIAGNOSIS — E03.9 HYPOTHYROIDISM, UNSPECIFIED TYPE: ICD-10-CM

## 2022-11-03 DIAGNOSIS — M46.1 SACROILIITIS (HCC): ICD-10-CM

## 2022-11-03 DIAGNOSIS — R73.01 IFG (IMPAIRED FASTING GLUCOSE): ICD-10-CM

## 2022-11-03 DIAGNOSIS — E78.41 ELEVATED LIPOPROTEIN(A): ICD-10-CM

## 2022-11-03 DIAGNOSIS — R35.1 NOCTURIA MORE THAN TWICE PER NIGHT: ICD-10-CM

## 2022-11-03 DIAGNOSIS — Z23 NEED FOR INFLUENZA VACCINATION: ICD-10-CM

## 2022-11-03 PROCEDURE — G8399 PT W/DXA RESULTS DOCUMENT: HCPCS | Performed by: INTERNAL MEDICINE

## 2022-11-03 PROCEDURE — 3078F DIAST BP <80 MM HG: CPT | Performed by: INTERNAL MEDICINE

## 2022-11-03 PROCEDURE — 1036F TOBACCO NON-USER: CPT | Performed by: INTERNAL MEDICINE

## 2022-11-03 PROCEDURE — 1090F PRES/ABSN URINE INCON ASSESS: CPT | Performed by: INTERNAL MEDICINE

## 2022-11-03 PROCEDURE — 3074F SYST BP LT 130 MM HG: CPT | Performed by: INTERNAL MEDICINE

## 2022-11-03 PROCEDURE — 90694 VACC AIIV4 NO PRSRV 0.5ML IM: CPT | Performed by: INTERNAL MEDICINE

## 2022-11-03 PROCEDURE — G0008 ADMIN INFLUENZA VIRUS VAC: HCPCS | Performed by: INTERNAL MEDICINE

## 2022-11-03 PROCEDURE — G8417 CALC BMI ABV UP PARAM F/U: HCPCS | Performed by: INTERNAL MEDICINE

## 2022-11-03 PROCEDURE — G8484 FLU IMMUNIZE NO ADMIN: HCPCS | Performed by: INTERNAL MEDICINE

## 2022-11-03 PROCEDURE — 1123F ACP DISCUSS/DSCN MKR DOCD: CPT | Performed by: INTERNAL MEDICINE

## 2022-11-03 PROCEDURE — 99215 OFFICE O/P EST HI 40 MIN: CPT | Performed by: INTERNAL MEDICINE

## 2022-11-03 PROCEDURE — G8427 DOCREV CUR MEDS BY ELIG CLIN: HCPCS | Performed by: INTERNAL MEDICINE

## 2022-11-03 RX ORDER — METOPROLOL SUCCINATE 50 MG/1
TABLET, EXTENDED RELEASE ORAL
Qty: 135 TABLET | Refills: 1 | Status: SHIPPED | OUTPATIENT
Start: 2022-11-03

## 2022-11-03 ASSESSMENT — PATIENT HEALTH QUESTIONNAIRE - PHQ9
1. LITTLE INTEREST OR PLEASURE IN DOING THINGS: 0
SUM OF ALL RESPONSES TO PHQ QUESTIONS 1-9: 0
SUM OF ALL RESPONSES TO PHQ9 QUESTIONS 1 & 2: 0
2. FEELING DOWN, DEPRESSED OR HOPELESS: 0
SUM OF ALL RESPONSES TO PHQ QUESTIONS 1-9: 0

## 2022-11-03 NOTE — PROGRESS NOTES
Patient last seen by me 10/14/2021: At that time complained of chest pain. PHILLIPS.  HTN. Thrombocytopenia. Hyperlipidemia, IFG, hypothyroid, macrocytic, sacroiliitis status epidurals. Medicine and Allergies Reviewed:  Reviewed Laboratory Data: 10/20/2022 chemistry panel ALT 52. AST 51. Alk phos 138. Lipid panel: Total cholesterol 134. LDL cholesterol 42. Triglycerides 201. CBC hemoglobin 16.3. Hematocrit 46.6.  platelets 501 this compares to 11/5/2021 with hemoglobin 8.3. Hematocrit 24.1. . Platelets 786. T4 total: 10.2. TSH 0.42. Health Maintenance Reviewed: Shingles vaccine, Tdap, AWV, flu vaccine. Hospital medicine admission 10/2/2021 until discharge 11/5/2021:  GI bleed with hemoglobin 5.7 received 2 units of packed red blood cells. EGD was normal.  Colonoscopy unremarkable. 11/16/2021 office visit reviewed: Dr. Andrew Riojas. Heart catheterization 10/14/2021. Mild CAD/ASHD DX treat medically. Labs: 10/20/2022. Reviewed: Last office visit with me: 10/14/2021: Patient using walker somewhat unsteady. About a year ago she did not need a walker. She complains of feeling tired. Patient with nocturia about every 2 hours. Denies incontinence. Present for about a year. She is been off Mobic. On Toprol.   Thyroid function test normal.        Results for orders placed or performed during the hospital encounter of 10/20/22   TSH with Reflex   Result Value Ref Range    TSH 0.42 0.27 - 4.20 uIU/mL   T4   Result Value Ref Range    T4, Total 10.2 4.5 - 10.9 ug/dL   Comprehensive Metabolic Panel   Result Value Ref Range    Sodium 142 136 - 145 mmol/L    Potassium 3.9 3.5 - 5.1 mmol/L    Chloride 102 99 - 110 mmol/L    CO2 22 21 - 32 mmol/L    Anion Gap 18 (H) 3 - 16    Glucose 97 70 - 99 mg/dL    BUN 18 7 - 20 mg/dL    Creatinine 0.8 0.6 - 1.2 mg/dL    Est, Glom Filt Rate >60 >60    Calcium 10.4 8.3 - 10.6 mg/dL    Total Protein 7.0 6.4 - 8.2 g/dL    Albumin 4.8 3.4 - 5.0 g/dL Albumin/Globulin Ratio 2.2 1.1 - 2.2    Total Bilirubin 0.9 0.0 - 1.0 mg/dL    Alkaline Phosphatase 138 (H) 40 - 129 U/L    ALT 52 (H) 10 - 40 U/L    AST 51 (H) 15 - 37 U/L   Lipid Panel   Result Value Ref Range    Cholesterol, Total 134 0 - 199 mg/dL    Triglycerides 201 (H) 0 - 150 mg/dL    HDL 52 40 - 60 mg/dL    LDL Calculated 42 <100 mg/dL    VLDL Cholesterol Calculated 40 Not Established mg/dL   CBC   Result Value Ref Range    WBC 5.4 4.0 - 11.0 K/uL    RBC 4.35 4.00 - 5.20 M/uL    Hemoglobin 16.3 (H) 12.0 - 16.0 g/dL    Hematocrit 46.6 36.0 - 48.0 %    .0 (H) 80.0 - 100.0 fL    MCH 37.4 (H) 26.0 - 34.0 pg    MCHC 35.0 31.0 - 36.0 g/dL    RDW 12.7 12.4 - 15.4 %    Platelets 048 (L) 806 - 450 K/uL    MPV 8.5 5.0 - 10.5 fL              ROS:  Review of Systems   Constitutional: negative   HENT: negative   EYES: negative   Respiratory: negative   Gastrointestinal: negative   Endocrine: negative   Musculoskeletal: negative   Skin: negative   Allergic/Immunological: negative   Hematological: negative   Psychiatric/Behavorial: negative   CV: negative   CNS: negative   :Negative   S/E:Negative  Renal: Negative     Physical Exam: BP: 150/74  Head/neck: Ears: Normal TM. No obstruction. Throat: Mask. Not examined. Thyroid not palpable. Neck: No lymphadenopathy. Eyes: EOMI, PERRLA with no nystagmus  Lungs: Clear to auscultation. CV: S1-S2 normal.   KATHERINE murmur. Carotid: No bruit. Abdominal Examination: Bowel sounds present. Soft nontender. No mass no guarding or   rebound. Spine/extremities: No edema. No tenderness to palpation. Skin: No rash  CNS: Patient is alert, cooperative, moves all 4 limbs, ambulates without difficulty, light touch normal.   Fairly good historian. Good orientation. Blood pressure (!) 150/74, pulse 84, temperature 97 °F (36.1 °C), temperature source Temporal, weight 178 lb 9.6 oz (81 kg), SpO2 99 %.        Cole Smith MD

## 2022-11-08 ENCOUNTER — TELEMEDICINE (OUTPATIENT)
Dept: INTERNAL MEDICINE CLINIC | Age: 82
End: 2022-11-08
Payer: MEDICARE

## 2022-11-08 DIAGNOSIS — Z00.00 MEDICARE ANNUAL WELLNESS VISIT, SUBSEQUENT: Primary | ICD-10-CM

## 2022-11-08 DIAGNOSIS — Z00.00 INITIAL MEDICARE ANNUAL WELLNESS VISIT: ICD-10-CM

## 2022-11-08 PROCEDURE — G8484 FLU IMMUNIZE NO ADMIN: HCPCS | Performed by: INTERNAL MEDICINE

## 2022-11-08 PROCEDURE — G0439 PPPS, SUBSEQ VISIT: HCPCS | Performed by: INTERNAL MEDICINE

## 2022-11-08 PROCEDURE — 1123F ACP DISCUSS/DSCN MKR DOCD: CPT | Performed by: INTERNAL MEDICINE

## 2022-11-08 ASSESSMENT — LIFESTYLE VARIABLES
HOW OFTEN DO YOU HAVE A DRINK CONTAINING ALCOHOL: 2-3 TIMES A WEEK
HOW MANY STANDARD DRINKS CONTAINING ALCOHOL DO YOU HAVE ON A TYPICAL DAY: 1 OR 2

## 2022-11-08 ASSESSMENT — PATIENT HEALTH QUESTIONNAIRE - PHQ9
SUM OF ALL RESPONSES TO PHQ QUESTIONS 1-9: 0
2. FEELING DOWN, DEPRESSED OR HOPELESS: 0
SUM OF ALL RESPONSES TO PHQ QUESTIONS 1-9: 0
1. LITTLE INTEREST OR PLEASURE IN DOING THINGS: 0
SUM OF ALL RESPONSES TO PHQ9 QUESTIONS 1 & 2: 0
SUM OF ALL RESPONSES TO PHQ QUESTIONS 1-9: 0
SUM OF ALL RESPONSES TO PHQ QUESTIONS 1-9: 0

## 2022-11-08 NOTE — PATIENT INSTRUCTIONS
Personalized Preventive Plan for Jacek Martin - 11/8/2022  Medicare offers a range of preventive health benefits. Some of the tests and screenings are paid in full while other may be subject to a deductible, co-insurance, and/or copay. Some of these benefits include a comprehensive review of your medical history including lifestyle, illnesses that may run in your family, and various assessments and screenings as appropriate. After reviewing your medical record and screening and assessments performed today your provider may have ordered immunizations, labs, imaging, and/or referrals for you. A list of these orders (if applicable) as well as your Preventive Care list are included within your After Visit Summary for your review. Other Preventive Recommendations:    A preventive eye exam performed by an eye specialist is recommended every 1-2 years to screen for glaucoma; cataracts, macular degeneration, and other eye disorders. A preventive dental visit is recommended every 6 months. Try to get at least 150 minutes of exercise per week or 10,000 steps per day on a pedometer . Order or download the FREE \"Exercise & Physical Activity: Your Everyday Guide\" from [employer]. Call 6-484.990.6997 or search [employer] online. You need 0748-6536 mg of calcium and 2850-2880 IU of vitamin D per day. It is possible to meet your calcium requirement with diet alone, but a vitamin D supplement is usually necessary to meet this goal.  When exposed to the sun, use a sunscreen that protects against both UVA and UVB radiation with an SPF of 30 or greater. Reapply every 2 to 3 hours or after sweating, drying off with a towel, or swimming. Always wear a seat belt when traveling in a car. Always wear a helmet when riding a bicycle or motorcycle.

## 2022-11-08 NOTE — PROGRESS NOTES
Medicare Annual Wellness Visit    Yue Celis is here for Medicare AWV    Assessment & Plan   Medicare annual wellness visit, subsequent  Initial Medicare annual wellness visit    Recommendations for Preventive Services Due: see orders and patient instructions/AVS.  Recommended screening schedule for the next 5-10 years is provided to the patient in written form: see Patient Instructions/AVS.     No follow-ups on file. Subjective       Patient's complete Health Risk Assessment and screening values have been reviewed and are found in Flowsheets. The following problems were reviewed today and where indicated follow up appointments were made and/or referrals ordered. Positive Risk Factor Screenings with Interventions:    Fall Risk:  Do you feel unsteady or are you worried about falling? : (!) yes (patient uses a walker)  2 or more falls in past year?: no  Fall with injury in past year?: no   Fall Risk Interventions:    Patient states she does use a walker outside of her home and within her home quite a bit. Health Habits/Nutrition:  Physical Activity: Insufficiently Active    Days of Exercise per Week: 7 days    Minutes of Exercise per Session: 20 min     Have you lost any weight without trying in the past 3 months?: No     Have you seen the dentist within the past year?: Yes  Health Habits/Nutrition Interventions:           Objective      Patient-Reported Vitals  No data recorded          Allergies   Allergen Reactions    Lisinopril      Cough. Vasotec      Cough. Prior to Visit Medications    Medication Sig Taking?  Authorizing Provider   carboxymethylcellulose 1 % ophthalmic solution 1 drop 1 (one) time if needed for Dry Eyes Yes Historical Provider, MD   metoprolol succinate (TOPROL XL) 50 MG extended release tablet TAKE 1 1/2 TABLETS BY MOUTH EVERY DAY FOR HIGH BLOOD PRESSURE  Patient taking differently: 50 mg TAKE 1 1/2 TABLETS BY MOUTH EVERY DAY FOR HIGH BLOOD PRESSURE Yes Reginald Codding A Day, MD   levothyroxine (SYNTHROID) 125 MCG tablet TAKE 1 TABLET BY MOUTH DAILY FOR THYROID Yes Skip Pitts MD   atorvastatin (LIPITOR) 40 MG tablet TAKE 1/2 TABLET BY MOUTH DAILY FOR HIGH CHOLESTEROL Yes Ana Pitts MD   acetaminophen (TYLENOL) 325 MG tablet Take 650 mg by mouth every 6 hours as needed for Pain Yes Historical Provider, MD   Multiple Vitamins-Minerals (THERAPEUTIC MULTIVITAMIN-MINERALS) tablet Take 1 tablet by mouth daily Yes Historical Provider, MD   anastrozole (ARIMIDEX) 1 MG tablet  Yes Historical Provider, MD   Handicap Placard [facility] Patient is unable to walk short distances   DX: DDD (degenerative disc disease), lumbar  [M51.36]    Expiration : 7-  LONG Winston CNP   Handicap Placard MISC by Does not apply route 3 year  LONG Winston CNP       CareTeam (Including outside providers/suppliers regularly involved in providing care):   Patient Care Team:  Benedicto Draper MD as PCP - [address] [place] [address] MD Gisselle as PCP - [facility]  Jules Cameron MD as PCP - Breast Clinic (General Surgery)  Mickey Ramsey MD as PCP - Hematology/Oncology (Hematology and Oncology)     Reviewed and updated this visit:  Tobacco  Allergies  Meds  Med Hx  Surg Hx  Soc Hx  Fam Hx          Ulysess Marilyn, was evaluated through a synchronous (real-time) audio-video encounter. The patient (or guardian if applicable) is aware that this is a billable service, which includes applicable co-pays. This Virtual Visit was conducted with patient's (and/or legal guardian's) consent. The visit was conducted pursuant to the emergency declaration under the [facility], [facility] authority and the [employer] General Act. Patient identification was verified, and a caregiver was present when appropriate. The patient was located at Home: [address]  [address] [facility] [address].    Provider was located at [facility] office: [facility].  [address]    Obie Valdovinos LPN, [id number], performed the documented evaluation under the direct supervision of the attending physician. This encounter was performed under myKimberly MDs, direct supervision, 11/8/2022.

## 2022-11-16 RX ORDER — LEVOTHYROXINE SODIUM 0.12 MG/1
TABLET ORAL
Qty: 60 TABLET | Refills: 0 | Status: SHIPPED | OUTPATIENT
Start: 2022-11-16

## 2022-11-16 NOTE — TELEPHONE ENCOUNTER
Refill request for levothyroxine medication.      Name of [address]      Last visit - 11/3/22     Pending visit - 5/4/23    Last refill -9/17/22      Medication Contract signed -   Last Oarrs ran-         Additional Comments

## 2023-01-16 RX ORDER — LEVOTHYROXINE SODIUM 0.12 MG/1
TABLET ORAL
Qty: 60 TABLET | Refills: 0 | Status: SHIPPED | OUTPATIENT
Start: 2023-01-16

## 2023-01-16 RX ORDER — ATORVASTATIN CALCIUM 40 MG/1
TABLET, FILM COATED ORAL
Qty: 45 TABLET | Refills: 1 | Status: SHIPPED | OUTPATIENT
Start: 2023-01-16

## 2023-01-16 NOTE — TELEPHONE ENCOUNTER
Refill request for  LEVOTHYROXINE 0.125MG (125MCG) TAB medication.      Name of [address]      Last visit - 11-8-2022     Pending visit - 5-4-2023    Last refill - 11-      Medication Contract signed -   Monster bob-         Additional Comments

## 2023-01-16 NOTE — TELEPHONE ENCOUNTER
Refill request for atorvastatin (LIPITOR) 40 MG tablet medication.     Name of [facility]acy- [facility]      Last visit - 11/3/22     Pending visit - 5/4/23    Last refill - 7/17/22      Medication Contract signed - PDMP Monitoring:    Last PDMP Wilfred as Reviewed:  Review User Review Instant Review Result          [unfilled]  Urine Drug Screenings (1 yr)    No resulted procedures found.       Medication Contract and Consent for Opioid Use Documents Filed        No documents found                   Last Oarrs ran-         Additional Comments

## 2023-02-16 DIAGNOSIS — I10 ESSENTIAL HYPERTENSION: ICD-10-CM

## 2023-02-17 RX ORDER — METOPROLOL SUCCINATE 50 MG/1
TABLET, EXTENDED RELEASE ORAL
Qty: 135 TABLET | Refills: 1 | Status: SHIPPED | OUTPATIENT
Start: 2023-02-17

## 2023-02-17 NOTE — TELEPHONE ENCOUNTER
Refill request for Metoprolol medication.      Name of [address] [place] [address] visit - 11/08/22     Pending visit - 05/04/23    Last refill -11/03/22      Medication Contract signed -   Last Oarrs ran-         Additional Comments

## 2023-03-21 RX ORDER — LEVOTHYROXINE SODIUM 0.12 MG/1
TABLET ORAL
Qty: 60 TABLET | Refills: 2 | Status: SHIPPED | OUTPATIENT
Start: 2023-03-21

## 2023-03-23 ENCOUNTER — OFFICE VISIT (OUTPATIENT)
Dept: INTERNAL MEDICINE CLINIC | Age: 83
End: 2023-03-23

## 2023-03-23 VITALS
DIASTOLIC BLOOD PRESSURE: 82 MMHG | HEART RATE: 82 BPM | OXYGEN SATURATION: 97 % | SYSTOLIC BLOOD PRESSURE: 126 MMHG | BODY MASS INDEX: 31.18 KG/M2 | HEIGHT: 63 IN | TEMPERATURE: 97.6 F | WEIGHT: 176 LBS

## 2023-03-23 DIAGNOSIS — B02.9 HERPES ZOSTER WITHOUT COMPLICATION: Primary | ICD-10-CM

## 2023-03-23 PROBLEM — E66.9 OBESITY: Status: ACTIVE | Noted: 2023-03-23

## 2023-03-23 PROBLEM — E03.9 HYPOTHYROIDISM, UNSPECIFIED: Status: ACTIVE | Noted: 2021-10-17

## 2023-03-23 PROBLEM — M51.36 OTHER INTERVERTEBRAL DISC DEGENERATION, LUMBAR REGION: Status: ACTIVE | Noted: 2021-10-17

## 2023-03-23 PROBLEM — R79.89 OTHER SPECIFIED ABNORMAL FINDINGS OF BLOOD CHEMISTRY: Status: ACTIVE | Noted: 2021-10-17

## 2023-03-23 PROBLEM — E78.5 HYPERLIPIDEMIA, UNSPECIFIED: Status: ACTIVE | Noted: 2021-10-17

## 2023-03-23 PROBLEM — Z96.643 PRESENCE OF ARTIFICIAL HIP JOINT, BILATERAL: Status: ACTIVE | Noted: 2021-10-17

## 2023-03-23 PROBLEM — R07.9 CHEST PAIN, UNSPECIFIED: Status: ACTIVE | Noted: 2021-10-17

## 2023-03-23 PROBLEM — M48.061 SPINAL STENOSIS, LUMBAR REGION WITHOUT NEUROGENIC CLAUDICATION: Status: ACTIVE | Noted: 2021-10-17

## 2023-03-23 PROBLEM — M51.369 OTHER INTERVERTEBRAL DISC DEGENERATION, LUMBAR REGION: Status: ACTIVE | Noted: 2021-10-17

## 2023-03-23 PROBLEM — M17.10 UNILATERAL PRIMARY OSTEOARTHRITIS, UNSPECIFIED KNEE: Status: ACTIVE | Noted: 2021-10-17

## 2023-03-23 PROBLEM — I10 ESSENTIAL (PRIMARY) HYPERTENSION: Status: ACTIVE | Noted: 2021-10-17

## 2023-03-23 PROBLEM — D69.6 THROMBOCYTOPENIA, UNSPECIFIED (HCC): Status: ACTIVE | Noted: 2021-10-17

## 2023-03-23 PROBLEM — M10.9 GOUT, UNSPECIFIED: Status: ACTIVE | Noted: 2021-10-17

## 2023-03-23 PROBLEM — N39.0 URINARY TRACT INFECTION, SITE NOT SPECIFIED: Status: ACTIVE | Noted: 2021-10-17

## 2023-03-23 PROBLEM — R60.9 EDEMA, UNSPECIFIED: Status: ACTIVE | Noted: 2021-10-17

## 2023-03-23 PROBLEM — M46.1 SACROILIITIS, NOT ELSEWHERE CLASSIFIED (HCC): Status: ACTIVE | Noted: 2021-10-17

## 2023-03-23 PROBLEM — N12 TUBULO-INTERSTITIAL NEPHRITIS, NOT SPECIFIED AS ACUTE OR CHRONIC: Status: ACTIVE | Noted: 2021-10-17

## 2023-03-23 PROBLEM — I10 HYPERTENSION: Status: ACTIVE | Noted: 2023-03-23

## 2023-03-23 PROBLEM — I21.4 NON-ST ELEVATION (NSTEMI) MYOCARDIAL INFARCTION (HCC): Status: ACTIVE | Noted: 2021-10-17

## 2023-03-23 PROBLEM — N95.1 MENOPAUSAL SYMPTOM: Status: ACTIVE | Noted: 2023-03-23

## 2023-03-23 PROBLEM — K76.0 FATTY (CHANGE OF) LIVER, NOT ELSEWHERE CLASSIFIED: Status: ACTIVE | Noted: 2021-10-17

## 2023-03-23 PROBLEM — M19.90 OSTEOARTHRITIS: Status: ACTIVE | Noted: 2023-03-23

## 2023-03-23 PROBLEM — M19.90 UNSPECIFIED OSTEOARTHRITIS, UNSPECIFIED SITE: Status: ACTIVE | Noted: 2021-10-17

## 2023-03-23 RX ORDER — VALACYCLOVIR HYDROCHLORIDE 1 G/1
1000 TABLET, FILM COATED ORAL 3 TIMES DAILY
Qty: 21 TABLET | Refills: 0 | Status: SHIPPED | OUTPATIENT
Start: 2023-03-23 | End: 2023-03-30

## 2023-03-23 SDOH — ECONOMIC STABILITY: FOOD INSECURITY: WITHIN THE PAST 12 MONTHS, YOU WORRIED THAT YOUR FOOD WOULD RUN OUT BEFORE YOU GOT MONEY TO BUY MORE.: NEVER TRUE

## 2023-03-23 SDOH — ECONOMIC STABILITY: INCOME INSECURITY: HOW HARD IS IT FOR YOU TO PAY FOR THE VERY BASICS LIKE FOOD, HOUSING, MEDICAL CARE, AND HEATING?: NOT HARD AT ALL

## 2023-03-23 SDOH — ECONOMIC STABILITY: FOOD INSECURITY: WITHIN THE PAST 12 MONTHS, THE FOOD YOU BOUGHT JUST DIDN'T LAST AND YOU DIDN'T HAVE MONEY TO GET MORE.: NEVER TRUE

## 2023-03-23 ASSESSMENT — PATIENT HEALTH QUESTIONNAIRE - PHQ9
2. FEELING DOWN, DEPRESSED OR HOPELESS: 0
SUM OF ALL RESPONSES TO PHQ QUESTIONS 1-9: 0
SUM OF ALL RESPONSES TO PHQ9 QUESTIONS 1 & 2: 0
1. LITTLE INTEREST OR PLEASURE IN DOING THINGS: 0
SUM OF ALL RESPONSES TO PHQ QUESTIONS 1-9: 0

## 2023-03-23 NOTE — PROGRESS NOTES
acetaminophen (TYLENOL) 325 MG tablet Take 650 mg by mouth every 6 hours as needed for Pain      Multiple Vitamins-Minerals (THERAPEUTIC MULTIVITAMIN-MINERALS) tablet Take 1 tablet by mouth daily      anastrozole (ARIMIDEX) 1 MG tablet   4     No current facility-administered medications on file prior to visit.       Past Medical History:   Diagnosis Date    Breast cancer ([facility]     Cancer ([facility] 09/2017    right breast cancer status post lumpectomy    Edema     Fatty liver     elevated LFT's    Gout     HIGH CHOLESTEROL     HTN (hypertension)     Hypothyroidism     IFG (impaired fasting glucose)     Osteoarthritis     Palpitations     Pyelonephritis 03     Patient Active Problem List   Diagnosis    Hypothyroidism    HTN (hypertension)    Gout    Arthritis    Palpitations    Edema    Elevated LFTs    IFG (impaired fasting glucose)    Fatty liver    Pyelonephritis    Localized osteoarthrosis, lower leg    DDD (degenerative disc disease), lumbar    Hyperlipidemia    Cataract    Sacroiliitis (HCC)    Hip pain    Peripheral edema    History of bilateral total hip arthroplasty    Spinal stenosis of lumbar region    Thrombocytopenia (HCC)    Chest pain    NSTEMI (non-ST elevated myocardial infarction) ([facility]    Orthostatic lightheadedness    GIB (gastrointestinal bleeding)    Chronic diastolic HF (heart failure) (HCC)    Menopausal symptom    Obesity    Other specified abnormal findings of blood chemistry    Presence of artificial hip joint, bilateral    Tubulo-interstitial nephritis, not specified as acute or chronic    Edema, unspecified    Other intervertebral disc degeneration, lumbar region    Unilateral primary osteoarthritis, unspecified knee    Osteoarthritis    Unspecified osteoarthritis, unspecified site    Gout, unspecified    Fatty (change of) liver, not elsewhere classified    Hyperlipidemia, unspecified    Chest pain, unspecified    Essential (primary) hypertension    Hypertension    Hypothyroidism, unspecified

## 2023-05-04 ENCOUNTER — HOSPITAL ENCOUNTER (OUTPATIENT)
Age: 83
End: 2023-05-04
Payer: MEDICARE

## 2023-05-04 ENCOUNTER — HOSPITAL ENCOUNTER (OUTPATIENT)
Age: 83
Discharge: HOME | End: 2023-05-04
Payer: MEDICARE

## 2023-05-04 ENCOUNTER — HOSPITAL ENCOUNTER (OUTPATIENT)
Dept: WOMENS IMAGING | Age: 83
Discharge: HOME | End: 2023-05-04
Payer: MEDICARE

## 2023-05-04 DIAGNOSIS — Z12.31 ENCOUNTER FOR SCREENING MAMMOGRAM FOR BREAST CANCER: ICD-10-CM

## 2023-05-04 DIAGNOSIS — D75.89 MACROCYTOSIS: ICD-10-CM

## 2023-05-04 LAB
DEPRECATED RDW RBC AUTO: 13 % (ref 12.4–15.4)
FOLATE SERPL-MCNC: >20 NG/ML (ref 4.78–24.2)
HCT VFR BLD AUTO: 47.6 % (ref 36–48)
HGB BLD-MCNC: 16.7 G/DL (ref 12–16)
MCH RBC QN AUTO: 37.1 PG (ref 26–34)
MCHC RBC AUTO-ENTMCNC: 35 G/DL (ref 31–36)
MCV RBC AUTO: 106.1 FL (ref 80–100)
PLATELET # BLD AUTO: 151 K/UL (ref 135–450)
PMV BLD AUTO: 8.8 FL (ref 5–10.5)
RBC # BLD AUTO: 4.49 M/UL (ref 4–5.2)
VIT B12 SERPL-MCNC: >2000 PG/ML (ref 211–911)
WBC # BLD AUTO: 9 K/UL (ref 4–11)

## 2023-05-04 PROCEDURE — 82607 VITAMIN B-12: CPT

## 2023-05-04 PROCEDURE — 82746 ASSAY OF FOLIC ACID SERUM: CPT

## 2023-05-04 PROCEDURE — 77063 BREAST TOMOSYNTHESIS BI: CPT

## 2023-05-04 PROCEDURE — 85027 COMPLETE CBC AUTOMATED: CPT

## 2023-05-05 PROBLEM — Z96.643 PRESENCE OF ARTIFICIAL HIP JOINT, BILATERAL: Status: RESOLVED | Noted: 2021-10-17 | Resolved: 2023-05-05

## 2023-05-05 PROBLEM — R07.9 CHEST PAIN, UNSPECIFIED: Status: RESOLVED | Noted: 2021-10-17 | Resolved: 2023-05-05

## 2023-05-05 PROBLEM — M15.9 PRIMARY OSTEOARTHRITIS INVOLVING MULTIPLE JOINTS: Status: ACTIVE | Noted: 2023-03-23

## 2023-05-05 PROBLEM — M15.0 PRIMARY OSTEOARTHRITIS INVOLVING MULTIPLE JOINTS: Status: ACTIVE | Noted: 2023-03-23

## 2023-05-05 PROBLEM — R07.9 CHEST PAIN: Status: RESOLVED | Noted: 2021-10-14 | Resolved: 2023-05-05

## 2023-05-05 PROBLEM — E66.09 CLASS 1 OBESITY DUE TO EXCESS CALORIES WITHOUT SERIOUS COMORBIDITY WITH BODY MASS INDEX (BMI) OF 31.0 TO 31.9 IN ADULT: Status: ACTIVE | Noted: 2023-03-23

## 2023-05-05 PROBLEM — N39.0 URINARY TRACT INFECTION, SITE NOT SPECIFIED: Status: RESOLVED | Noted: 2021-10-17 | Resolved: 2023-05-05

## 2023-05-05 PROBLEM — M46.1 SACROILIITIS, NOT ELSEWHERE CLASSIFIED (HCC): Status: RESOLVED | Noted: 2021-10-17 | Resolved: 2023-05-05

## 2023-05-05 PROBLEM — E66.811 CLASS 1 OBESITY DUE TO EXCESS CALORIES WITHOUT SERIOUS COMORBIDITY WITH BODY MASS INDEX (BMI) OF 31.0 TO 31.9 IN ADULT: Status: ACTIVE | Noted: 2023-03-23

## 2023-05-05 PROBLEM — K92.2 GIB (GASTROINTESTINAL BLEEDING): Status: RESOLVED | Noted: 2021-11-02 | Resolved: 2023-05-05

## 2023-05-05 PROBLEM — N95.1 MENOPAUSAL SYMPTOM: Status: RESOLVED | Noted: 2023-03-23 | Resolved: 2023-05-05

## 2023-05-05 PROBLEM — Z96.643 HISTORY OF BILATERAL TOTAL HIP ARTHROPLASTY: Status: RESOLVED | Noted: 2019-10-14 | Resolved: 2023-05-05

## 2023-05-05 PROBLEM — N12 TUBULO-INTERSTITIAL NEPHRITIS, NOT SPECIFIED AS ACUTE OR CHRONIC: Status: RESOLVED | Noted: 2021-10-17 | Resolved: 2023-05-05

## 2023-05-11 ENCOUNTER — OFFICE VISIT (OUTPATIENT)
Dept: INTERNAL MEDICINE CLINIC | Age: 83
End: 2023-05-11

## 2023-05-11 VITALS
RESPIRATION RATE: 14 BRPM | TEMPERATURE: 98.1 F | BODY MASS INDEX: 31.5 KG/M2 | OXYGEN SATURATION: 97 % | WEIGHT: 175 LBS | HEART RATE: 90 BPM | SYSTOLIC BLOOD PRESSURE: 124 MMHG | DIASTOLIC BLOOD PRESSURE: 78 MMHG

## 2023-05-11 DIAGNOSIS — E66.09 CLASS 1 OBESITY DUE TO EXCESS CALORIES WITHOUT SERIOUS COMORBIDITY WITH BODY MASS INDEX (BMI) OF 31.0 TO 31.9 IN ADULT: ICD-10-CM

## 2023-05-11 DIAGNOSIS — R60.9 PERIPHERAL EDEMA: ICD-10-CM

## 2023-05-11 DIAGNOSIS — I10 ESSENTIAL (PRIMARY) HYPERTENSION: ICD-10-CM

## 2023-05-11 DIAGNOSIS — E03.9 HYPOTHYROIDISM, UNSPECIFIED TYPE: ICD-10-CM

## 2023-05-11 DIAGNOSIS — D75.89 MACROCYTOSIS WITHOUT ANEMIA: Primary | ICD-10-CM

## 2023-05-11 DIAGNOSIS — M15.9 PRIMARY OSTEOARTHRITIS INVOLVING MULTIPLE JOINTS: ICD-10-CM

## 2023-05-11 DIAGNOSIS — D69.6 THROMBOCYTOPENIA (HCC): ICD-10-CM

## 2023-05-11 DIAGNOSIS — R42 ORTHOSTATIC LIGHTHEADEDNESS: ICD-10-CM

## 2023-05-11 DIAGNOSIS — E78.2 MIXED HYPERLIPIDEMIA: ICD-10-CM

## 2023-05-11 DIAGNOSIS — I50.32 CHRONIC DIASTOLIC HF (HEART FAILURE) (HCC): ICD-10-CM

## 2023-05-11 DIAGNOSIS — R73.01 IFG (IMPAIRED FASTING GLUCOSE): ICD-10-CM

## 2023-05-11 SDOH — ECONOMIC STABILITY: FOOD INSECURITY: WITHIN THE PAST 12 MONTHS, THE FOOD YOU BOUGHT JUST DIDN'T LAST AND YOU DIDN'T HAVE MONEY TO GET MORE.: NEVER TRUE

## 2023-05-11 SDOH — ECONOMIC STABILITY: FOOD INSECURITY: WITHIN THE PAST 12 MONTHS, YOU WORRIED THAT YOUR FOOD WOULD RUN OUT BEFORE YOU GOT MONEY TO BUY MORE.: NEVER TRUE

## 2023-05-11 SDOH — ECONOMIC STABILITY: INCOME INSECURITY: HOW HARD IS IT FOR YOU TO PAY FOR THE VERY BASICS LIKE FOOD, HOUSING, MEDICAL CARE, AND HEATING?: NOT HARD AT ALL

## 2023-05-11 ASSESSMENT — ENCOUNTER SYMPTOMS
CONSTIPATION: 0
COLOR CHANGE: 0
VOMITING: 0
BLOOD IN STOOL: 0
ABDOMINAL DISTENTION: 0
ABDOMINAL PAIN: 0
CHEST TIGHTNESS: 0
BACK PAIN: 0
DIARRHEA: 0
NAUSEA: 0
ANAL BLEEDING: 0
SHORTNESS OF BREATH: 0

## 2023-05-11 NOTE — PROGRESS NOTES
higher in fiber.  - Avoid starches such as bread, rice, potatoes, pasta and all sources of simple sugars (desserts, soda, breakfast cereals). - Choose beverages that are calorie and sugar free. No follow-ups on file. Patientshould call the office immediately with new or ongoing signs or symptoms or worsening, or proceed to the emergency room. If you are on medications which could impair your senses, you are at risk of weakness, falls,dizziness, or drowsiness. You should be careful during activities which could place you at risk of harm, such as climbing, using stairs, operating machinery, or driving vehicles. If you feel you cannot safely do theseactivities, you should request others to help you, or avoid the activities altogether. If you are drowsy for any other reason, you should use the same precautions as listed above. Call if pattern of symptoms change or persists for an extended time.       Krystal Chicas

## 2023-05-31 ENCOUNTER — TELEPHONE (OUTPATIENT)
Dept: INTERNAL MEDICINE CLINIC | Age: 83
End: 2023-05-31

## 2023-05-31 NOTE — TELEPHONE ENCOUNTER
/80 is the average    Patient needs a refill and would like to know if she should take the Metroprolol 50mg or 75mg?     Please advise

## 2023-06-02 NOTE — TELEPHONE ENCOUNTER
2nd call attempt    Called pt and got a vm with a authentication message. Wasn't able to discuss things with pt.

## 2023-06-05 DIAGNOSIS — I10 ESSENTIAL HYPERTENSION: ICD-10-CM

## 2023-06-05 RX ORDER — METOPROLOL SUCCINATE 50 MG/1
TABLET, EXTENDED RELEASE ORAL
Qty: 135 TABLET | Refills: 1 | Status: SHIPPED | OUTPATIENT
Start: 2023-06-05

## 2023-06-05 NOTE — TELEPHONE ENCOUNTER
Additional Comments PATIENT IS OUT OF MEDICATION    Refill request for METOPROLOL medication.      Name of [address]      Last visit - 5-11-23     Pending visit - 11-16-23    Last refill -2-17-23      Medication Contract signed -   Monster bob-

## 2023-07-20 RX ORDER — ATORVASTATIN CALCIUM 40 MG/1
TABLET, FILM COATED ORAL
Qty: 45 TABLET | Refills: 1 | Status: SHIPPED | OUTPATIENT
Start: 2023-07-20

## 2023-07-20 NOTE — TELEPHONE ENCOUNTER
Refill request for ATORVASTATIN 40MG TABLETS medication.      Name of [address]      Last visit - 5-     Pending visit - 11-    Last refill - 4-      Medication Contract signed -   Last [place] ran-         Additional Comments

## 2023-09-18 RX ORDER — LEVOTHYROXINE SODIUM 0.12 MG/1
TABLET ORAL
Qty: 60 TABLET | Refills: 2 | Status: SHIPPED | OUTPATIENT
Start: 2023-09-18

## 2023-09-18 NOTE — TELEPHONE ENCOUNTER
Refill request for LEVOTHYROXINE 0.125MG (125MCG) TAB medication.      Name of [address]      Last visit - 5-     Pending visit - 11-    Last refill - 7-      Medication Contract signed -   Last Shannon bob-         Additional Comments

## 2023-10-31 ENCOUNTER — TELEPHONE (OUTPATIENT)
Dept: INTERNAL MEDICINE CLINIC | Age: 83
End: 2023-10-31

## 2023-10-31 DIAGNOSIS — Z00.00 WELL ADULT EXAM: Primary | ICD-10-CM

## 2023-10-31 NOTE — TELEPHONE ENCOUNTER
Patient called and stated she has an appointment with Thierry Tomas on 11/16/2023. She would like lab orders for CBC, Thyroid, Comprehensive Metabolic Panel, and TSH with Reflex to be put in for her to have done. She would like to get these done by Thursday, 11/2/2023. Please call patient when these orders are placed at [phone].

## 2023-11-02 ENCOUNTER — HOSPITAL ENCOUNTER (OUTPATIENT)
Age: 83
Setting detail: SPECIMEN
Discharge: HOME | End: 2023-11-02
Payer: MEDICARE

## 2023-11-02 DIAGNOSIS — Z00.00 WELL ADULT EXAM: ICD-10-CM

## 2023-11-02 LAB
ALBUMIN SERPL-MCNC: 4.3 G/DL (ref 3.4–5)
ALBUMIN/GLOB SERPL: 1.7 {RATIO} (ref 1.1–2.2)
ALP SERPL-CCNC: 123 U/L (ref 40–129)
ALT SERPL-CCNC: 59 U/L (ref 10–40)
ANION GAP SERPL CALCULATED.3IONS-SCNC: 11 MMOL/L (ref 3–16)
AST SERPL-CCNC: 63 U/L (ref 15–37)
BASOPHILS # BLD: 0.1 K/UL (ref 0–0.2)
BASOPHILS NFR BLD: 0.9 %
BILIRUB SERPL-MCNC: 0.7 MG/DL (ref 0–1)
BUN SERPL-MCNC: 18 MG/DL (ref 7–20)
CALCIUM SERPL-MCNC: 9.6 MG/DL (ref 8.3–10.6)
CHLORIDE SERPL-SCNC: 105 MMOL/L (ref 99–110)
CHOLEST SERPL-MCNC: 132 MG/DL (ref 0–199)
CO2 SERPL-SCNC: 24 MMOL/L (ref 21–32)
CREAT SERPL-MCNC: 0.7 MG/DL (ref 0.6–1.2)
DEPRECATED RDW RBC AUTO: 13.1 % (ref 12.4–15.4)
EOSINOPHIL # BLD: 0.2 K/UL (ref 0–0.6)
EOSINOPHIL NFR BLD: 3.4 %
GFR SERPLBLD CREATININE-BSD FMLA CKD-EPI: >60 ML/MIN/{1.73_M2}
GLUCOSE SERPL-MCNC: 87 MG/DL (ref 70–99)
HCT VFR BLD AUTO: 45.7 % (ref 36–48)
HDLC SERPL-MCNC: 52 MG/DL (ref 40–60)
HGB BLD-MCNC: 15.9 G/DL (ref 12–16)
LDLC SERPL CALC-MCNC: 44 MG/DL
LYMPHOCYTES # BLD: 1.9 K/UL (ref 1–5.1)
LYMPHOCYTES NFR BLD: 25.4 %
MCH RBC QN AUTO: 37.4 PG (ref 26–34)
MCHC RBC AUTO-ENTMCNC: 34.9 G/DL (ref 31–36)
MCV RBC AUTO: 107.4 FL (ref 80–100)
MONOCYTES # BLD: 0.7 K/UL (ref 0–1.3)
MONOCYTES NFR BLD: 9.5 %
NEUTROPHILS # BLD: 4.5 K/UL (ref 1.7–7.7)
NEUTROPHILS NFR BLD: 60.8 %
PLATELET # BLD AUTO: 114 K/UL (ref 135–450)
PMV BLD AUTO: 8.5 FL (ref 5–10.5)
POTASSIUM SERPL-SCNC: 4.2 MMOL/L (ref 3.5–5.1)
PROT SERPL-MCNC: 6.8 G/DL (ref 6.4–8.2)
RBC # BLD AUTO: 4.26 M/UL (ref 4–5.2)
SODIUM SERPL-SCNC: 140 MMOL/L (ref 136–145)
TRIGL SERPL-MCNC: 181 MG/DL (ref 0–150)
VLDLC SERPL CALC-MCNC: 36 MG/DL
WBC # BLD AUTO: 7.4 K/UL (ref 4–11)

## 2023-11-02 PROCEDURE — 36415 COLL VENOUS BLD VENIPUNCTURE: CPT

## 2023-11-02 PROCEDURE — 83036 HEMOGLOBIN GLYCOSYLATED A1C: CPT

## 2023-11-02 PROCEDURE — 80061 LIPID PANEL: CPT

## 2023-11-02 PROCEDURE — 85025 COMPLETE CBC W/AUTO DIFF WBC: CPT

## 2023-11-02 PROCEDURE — 80053 COMPREHEN METABOLIC PANEL: CPT

## 2023-11-03 LAB
EST. AVERAGE GLUCOSE BLD GHB EST-MCNC: 91.1 MG/DL
HBA1C MFR BLD: 4.8 %

## 2023-11-16 ENCOUNTER — OFFICE VISIT (OUTPATIENT)
Dept: INTERNAL MEDICINE CLINIC | Age: 83
End: 2023-11-16

## 2023-11-16 VITALS
HEART RATE: 93 BPM | WEIGHT: 178 LBS | TEMPERATURE: 97 F | SYSTOLIC BLOOD PRESSURE: 120 MMHG | BODY MASS INDEX: 31.54 KG/M2 | DIASTOLIC BLOOD PRESSURE: 80 MMHG | HEIGHT: 63 IN | OXYGEN SATURATION: 98 %

## 2023-11-16 DIAGNOSIS — I50.32 CHRONIC DIASTOLIC HF (HEART FAILURE) (HCC): ICD-10-CM

## 2023-11-16 DIAGNOSIS — M25.50 ARTHRALGIA, UNSPECIFIED JOINT: ICD-10-CM

## 2023-11-16 DIAGNOSIS — Z23 NEED FOR INFLUENZA VACCINATION: Primary | ICD-10-CM

## 2023-11-16 DIAGNOSIS — D75.89 MACROCYTOSIS WITHOUT ANEMIA: ICD-10-CM

## 2023-11-16 DIAGNOSIS — K76.0 FATTY LIVER: ICD-10-CM

## 2023-11-16 DIAGNOSIS — I10 ESSENTIAL (PRIMARY) HYPERTENSION: ICD-10-CM

## 2023-11-16 DIAGNOSIS — R00.2 PALPITATIONS: ICD-10-CM

## 2023-11-16 DIAGNOSIS — Z00.00 WELL ADULT EXAM: ICD-10-CM

## 2023-11-16 DIAGNOSIS — E66.09 CLASS 1 OBESITY DUE TO EXCESS CALORIES WITHOUT SERIOUS COMORBIDITY WITH BODY MASS INDEX (BMI) OF 31.0 TO 31.9 IN ADULT: ICD-10-CM

## 2023-11-16 DIAGNOSIS — R42 ORTHOSTATIC LIGHTHEADEDNESS: ICD-10-CM

## 2023-11-16 DIAGNOSIS — E03.9 HYPOTHYROIDISM, UNSPECIFIED TYPE: ICD-10-CM

## 2023-11-16 RX ORDER — OLOPATADINE HYDROCHLORIDE 1 MG/ML
1 SOLUTION/ DROPS OPHTHALMIC 2 TIMES DAILY
COMMUNITY

## 2023-11-16 ASSESSMENT — ENCOUNTER SYMPTOMS
CONSTIPATION: 0
CHEST TIGHTNESS: 0
SHORTNESS OF BREATH: 0
VOMITING: 0
DIARRHEA: 0
NAUSEA: 0
ABDOMINAL DISTENTION: 0

## 2023-11-16 NOTE — PROGRESS NOTES
Topics    Alcohol use: Yes     Alcohol/week: 5.0 - 6.0 standard drinks of alcohol     Types: 5 - 6 Glasses of wine per week     Comment: per week        Current Outpatient Medications   Medication Sig Dispense Refill    olopatadine (PATANOL) 0.1 % ophthalmic solution 1 drop 2 times daily      diclofenac sodium (VOLTAREN) 1 % GEL Apply 4 g topically 4 times daily 150 g 0    levothyroxine (SYNTHROID) 125 MCG tablet TAKE 1 TABLET BY MOUTH DAILY FOR THYROID 60 tablet 2    atorvastatin (LIPITOR) 40 MG tablet TAKE 1/2 TABLET BY MOUTH DAILY FOR HIGH CHOLESTEROL 45 tablet 1    metoprolol succinate (TOPROL XL) 50 MG extended release tablet TAKE 1 AND 1/2 TABLETS BY MOUTH EVERY DAY FOR HIGH BLOOD PRESSURE 135 tablet 1    Handicap Placard MISC Patient is unable to walk short distances   DX: DDD (degenerative disc disease), lumbar  [M51.36]    Expiration : 7- 1 each 0    Handicap Placard MISC by Does not apply route 3 year 1 each 0    acetaminophen (TYLENOL) 325 MG tablet Take 2 tablets by mouth every 6 hours as needed for Pain      Multiple Vitamins-Minerals (THERAPEUTIC MULTIVITAMIN-MINERALS) tablet Take 1 tablet by mouth daily      anastrozole (ARIMIDEX) 1 MG tablet   4     No current facility-administered medications for this visit. Allergies   Allergen Reactions    Enalapril     Vasotec      Cough. Lisinopril      Cough. Subjective:      Review of Systems   Constitutional:  Positive for fatigue. Negative for activity change and unexpected weight change. Respiratory:  Negative for chest tightness and shortness of breath. Cardiovascular:  Negative for chest pain, palpitations and leg swelling. Gastrointestinal:  Negative for abdominal distention, constipation, diarrhea, nausea and vomiting. Genitourinary:  Negative for difficulty urinating and urgency. Skin:  Negative for rash. Neurological:  Negative for dizziness, weakness and light-headedness.        Objective:     Vitals:    11/16/23

## 2024-01-09 DIAGNOSIS — I10 ESSENTIAL HYPERTENSION: ICD-10-CM

## 2024-01-09 RX ORDER — METOPROLOL SUCCINATE 50 MG/1
TABLET, EXTENDED RELEASE ORAL
Qty: 135 TABLET | Refills: 1 | Status: SHIPPED | OUTPATIENT
Start: 2024-01-09

## 2024-01-09 NOTE — TELEPHONE ENCOUNTER
Refill request for  medication.     METOPROLOL 50 MG ER     Name of [facility]- [facility]       Last visit - 11/16/2023     Pending visit - 4/25/2024    Last refill -6/5/2023              Additional Comments

## 2024-01-31 RX ORDER — ATORVASTATIN CALCIUM 40 MG/1
TABLET, FILM COATED ORAL
Qty: 45 TABLET | Refills: 1 | Status: SHIPPED | OUTPATIENT
Start: 2024-01-31

## 2024-01-31 NOTE — TELEPHONE ENCOUNTER
Refill request for ATORVASTATIN medication.     Name of Pharmacy-       Last visit - 11/16/23     Pending visit - 4/25/23    Last refill -10/21/23      Medication Contract signed -   Last Oarrs ran-         Additional Comments

## 2024-04-09 RX ORDER — LEVOTHYROXINE SODIUM 0.12 MG/1
TABLET ORAL
Qty: 60 TABLET | Refills: 2 | Status: SHIPPED | OUTPATIENT
Start: 2024-04-09

## 2024-04-09 NOTE — TELEPHONE ENCOUNTER
Refill request for Levothyroxine ( Synthroid) 125 MCG medication.     Name of Pharmacy- [facility]      Last visit - 11/16/23     Pending visit - 4/25/24    Last refill -9/18/23      Medication Contract signed -   Last Oarrs ran-         Additional Comments

## 2024-04-23 ASSESSMENT — PATIENT HEALTH QUESTIONNAIRE - PHQ9
SUM OF ALL RESPONSES TO PHQ QUESTIONS 1-9: 2
SUM OF ALL RESPONSES TO PHQ QUESTIONS 1-9: 2
1. LITTLE INTEREST OR PLEASURE IN DOING THINGS: SEVERAL DAYS
SUM OF ALL RESPONSES TO PHQ QUESTIONS 1-9: 2
SUM OF ALL RESPONSES TO PHQ QUESTIONS 1-9: 2
2. FEELING DOWN, DEPRESSED OR HOPELESS: SEVERAL DAYS
SUM OF ALL RESPONSES TO PHQ9 QUESTIONS 1 & 2: 2
1. LITTLE INTEREST OR PLEASURE IN DOING THINGS: SEVERAL DAYS
2. FEELING DOWN, DEPRESSED OR HOPELESS: SEVERAL DAYS
SUM OF ALL RESPONSES TO PHQ9 QUESTIONS 1 & 2: 2

## 2024-04-25 ENCOUNTER — OFFICE VISIT (OUTPATIENT)
Dept: INTERNAL MEDICINE CLINIC | Age: 84
End: 2024-04-25

## 2024-04-25 VITALS
WEIGHT: 172 LBS | RESPIRATION RATE: 12 BRPM | HEART RATE: 94 BPM | HEIGHT: 63 IN | SYSTOLIC BLOOD PRESSURE: 128 MMHG | OXYGEN SATURATION: 98 % | TEMPERATURE: 97 F | BODY MASS INDEX: 30.48 KG/M2 | DIASTOLIC BLOOD PRESSURE: 78 MMHG

## 2024-04-25 DIAGNOSIS — R74.01 ELEVATION OF LEVELS OF LIVER TRANSAMINASE LEVELS: ICD-10-CM

## 2024-04-25 DIAGNOSIS — E03.9 HYPOTHYROIDISM, UNSPECIFIED TYPE: ICD-10-CM

## 2024-04-25 DIAGNOSIS — K76.0 FATTY LIVER: ICD-10-CM

## 2024-04-25 DIAGNOSIS — I50.32 CHRONIC DIASTOLIC HF (HEART FAILURE) (HCC): ICD-10-CM

## 2024-04-25 DIAGNOSIS — I10 ESSENTIAL (PRIMARY) HYPERTENSION: ICD-10-CM

## 2024-04-25 DIAGNOSIS — R73.01 IFG (IMPAIRED FASTING GLUCOSE): ICD-10-CM

## 2024-04-25 DIAGNOSIS — E78.2 MIXED HYPERLIPIDEMIA: ICD-10-CM

## 2024-04-25 DIAGNOSIS — Z71.89 ACP (ADVANCE CARE PLANNING): ICD-10-CM

## 2024-04-25 DIAGNOSIS — Z00.00 MEDICARE ANNUAL WELLNESS VISIT, SUBSEQUENT: ICD-10-CM

## 2024-04-25 DIAGNOSIS — D69.6 THROMBOCYTOPENIA (HCC): Primary | ICD-10-CM

## 2024-04-25 DIAGNOSIS — D75.1 POLYCYTHEMIA: Primary | ICD-10-CM

## 2024-04-25 DIAGNOSIS — R42 ORTHOSTATIC LIGHTHEADEDNESS: ICD-10-CM

## 2024-04-25 DIAGNOSIS — Z00.00 WELL ADULT EXAM: ICD-10-CM

## 2024-04-25 PROBLEM — M48.061 SPINAL STENOSIS OF LUMBAR REGION: Status: RESOLVED | Noted: 2019-10-14 | Resolved: 2024-04-25

## 2024-04-25 SDOH — ECONOMIC STABILITY: FOOD INSECURITY: WITHIN THE PAST 12 MONTHS, YOU WORRIED THAT YOUR FOOD WOULD RUN OUT BEFORE YOU GOT MONEY TO BUY MORE.: NEVER TRUE

## 2024-04-25 SDOH — ECONOMIC STABILITY: FOOD INSECURITY: WITHIN THE PAST 12 MONTHS, THE FOOD YOU BOUGHT JUST DIDN'T LAST AND YOU DIDN'T HAVE MONEY TO GET MORE.: NEVER TRUE

## 2024-04-25 SDOH — ECONOMIC STABILITY: INCOME INSECURITY: HOW HARD IS IT FOR YOU TO PAY FOR THE VERY BASICS LIKE FOOD, HOUSING, MEDICAL CARE, AND HEATING?: NOT HARD AT ALL

## 2024-04-25 ASSESSMENT — ANXIETY QUESTIONNAIRES
2. NOT BEING ABLE TO STOP OR CONTROL WORRYING: NOT AT ALL
IF YOU CHECKED OFF ANY PROBLEMS ON THIS QUESTIONNAIRE, HOW DIFFICULT HAVE THESE PROBLEMS MADE IT FOR YOU TO DO YOUR WORK, TAKE CARE OF THINGS AT HOME, OR GET ALONG WITH OTHER PEOPLE: NOT DIFFICULT AT ALL
GAD7 TOTAL SCORE: 0
7. FEELING AFRAID AS IF SOMETHING AWFUL MIGHT HAPPEN: NOT AT ALL
3. WORRYING TOO MUCH ABOUT DIFFERENT THINGS: NOT AT ALL
5. BEING SO RESTLESS THAT IT IS HARD TO SIT STILL: NOT AT ALL
6. BECOMING EASILY ANNOYED OR IRRITABLE: NOT AT ALL
1. FEELING NERVOUS, ANXIOUS, OR ON EDGE: NOT AT ALL
4. TROUBLE RELAXING: NOT AT ALL

## 2024-04-25 NOTE — PROGRESS NOTES
Medicare Annual Wellness Visit    Anastasiia Carrasquillo is here for Follow-up (6 month) and Medicare AW    Assessment & Plan   Thrombocytopenia (HCC)    Recent labs largely stable. 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 She is not interested in bone marrow biopsy. Continue with Dr. Morse. No current symptoms aside from fatigue however this is likely more attributable to hypothyroid.     Essential (primary) hypertension  Orthostatic lightheadedness  Chronic diastolic HF (heart failure) (HCC)  Assessment & Plan:   Well-controlled, continue current medications and continue current treatment plan    Encouraged decreasing sodium in the diet, weight loss, and gradual increases in exercise at least 20 minutes daily to further benefit BP    Hypothyroidism, unspecified type    Stable on recent labs. Continue current regimen    Fatty liver  Mixed hyperlipidemia    Continue statin. Encouraged decreasing fatty, cholesterol rich foods in the diet (I.e. Red meats, butter, whole fat milk). Dietary choices like olive oil instead of butter, baked foods instead of fried can help to further prevent future elevations. Foods high in omega fatty acids can help to further decrease lipids additionally. Emphasis placed on incorporating fish, lean proteins (chicken, turkey, pork), fresh fruits and vegetables.     IFG (impaired fasting glucose)    Discussed the pathophysiology of DM and its progressive downward trend  Reviewed the need to focus on portion control, eating across the food groups    Encouraged dietary improvement/monitoring to include less simple carbs (candies, desserts, breads/pastas) and emphasis on healthier carbs like fruits (berries) and sources of whole grain to prevent fluctuations in glucose. All of these should still be consumed in moderation, however. Exercise can help utilize excess glucose additionally and can help to lose excess

## 2024-04-25 NOTE — PATIENT INSTRUCTIONS
blindness.  Color vision is often tested as part of a routine exam. You may also have this test when you apply for a job where recognizing different colors is important, such as , electronics, or the .  How are vision tests done?  Visual acuity test   You cover one eye at a time.  You read aloud from a wall chart across the room.  You read aloud from a small card that you hold in your hand.  Refraction   You look into a special device.  The device puts lenses of different strengths in front of each eye to see how strong your glasses or contact lenses need to be.  Visual field tests   Your doctor may have you look through special machines.  Or your doctor may simply have you stare straight ahead while they move a finger into and out of your field of vision.  Color vision test   You look at pieces of printed test patterns in various colors. You say what number or symbol you see.  Your doctor may have you trace the number or symbol using a pointer.  How do these tests feel?  There is very little chance of having a problem from this test. If dilating drops are used for a vision test, they may make the eyes sting and cause a medicine taste in the mouth.  Follow-up care is a key part of your treatment and safety. Be sure to make and go to all appointments, and call your doctor if you are having problems. It's also a good idea to know your test results and keep a list of the medicines you take.  Where can you learn more?  Go to https://www.[employer].net/patientEd and enter G551 to learn more about \"Learning About Vision Tests.\"  Current as of: June 5, 2023               Content Version: 14.0  © 6789-3766 [employer].   Care instructions adapted under license by [employer]. If you have questions about a medical condition or this instruction, always ask your healthcare professional. [employer] disclaims any warranty or liability for your use of this information.

## 2024-05-09 ENCOUNTER — HOSPITAL ENCOUNTER (OUTPATIENT)
Dept: WOMENS IMAGING | Age: 84
Discharge: HOME | End: 2024-05-09
Payer: MEDICARE

## 2024-05-09 VITALS — WEIGHT: 172 LBS | BODY MASS INDEX: 30.48 KG/M2 | HEIGHT: 63 IN

## 2024-05-09 DIAGNOSIS — Z12.31 SCREENING MAMMOGRAM FOR BREAST CANCER: ICD-10-CM

## 2024-05-09 PROCEDURE — 77063 BREAST TOMOSYNTHESIS BI: CPT

## 2024-08-26 RX ORDER — ATORVASTATIN CALCIUM 40 MG/1
TABLET, FILM COATED ORAL
Qty: 45 TABLET | Refills: 1 | Status: SHIPPED | OUTPATIENT
Start: 2024-08-26

## 2024-08-26 NOTE — TELEPHONE ENCOUNTER
Refill request for Atorvastatin medication.     Name of Pharmacy- [facility]       Last visit - 04/25/2024     Pending visit - 10/24/2024    Last refill -01/31/2024

## 2024-10-03 ENCOUNTER — HOSPITAL ENCOUNTER (OUTPATIENT)
Age: 84
Setting detail: SPECIMEN
Discharge: HOME | End: 2024-10-03
Payer: MEDICARE

## 2024-10-03 DIAGNOSIS — D75.1 POLYCYTHEMIA: ICD-10-CM

## 2024-10-03 DIAGNOSIS — Z00.00 WELL ADULT EXAM: ICD-10-CM

## 2024-10-03 DIAGNOSIS — R74.01 ELEVATION OF LEVELS OF LIVER TRANSAMINASE LEVELS: ICD-10-CM

## 2024-10-03 LAB
ALBUMIN SERPL-MCNC: 4.3 G/DL (ref 3.4–5)
ALBUMIN/GLOB SERPL: 1.8 {RATIO} (ref 1.1–2.2)
ALP SERPL-CCNC: 135 U/L (ref 40–129)
ALT SERPL-CCNC: 59 U/L (ref 10–40)
ANION GAP SERPL CALCULATED.3IONS-SCNC: 13 MMOL/L (ref 3–16)
AST SERPL-CCNC: 57 U/L (ref 15–37)
BASOPHILS # BLD: 0.1 K/UL (ref 0–0.2)
BASOPHILS NFR BLD: 0.9 %
BILIRUB SERPL-MCNC: 0.8 MG/DL (ref 0–1)
BUN SERPL-MCNC: 20 MG/DL (ref 7–20)
CALCIUM SERPL-MCNC: 10.1 MG/DL (ref 8.3–10.6)
CHLORIDE SERPL-SCNC: 105 MMOL/L (ref 99–110)
CHOLEST SERPL-MCNC: 134 MG/DL (ref 0–199)
CO2 SERPL-SCNC: 23 MMOL/L (ref 21–32)
CREAT SERPL-MCNC: 0.7 MG/DL (ref 0.6–1.2)
DEPRECATED RDW RBC AUTO: 12.1 % (ref 12.4–15.4)
EOSINOPHIL # BLD: 0.3 K/UL (ref 0–0.6)
EOSINOPHIL NFR BLD: 3.2 %
EST. AVERAGE GLUCOSE BLD GHB EST-MCNC: 85.3 MG/DL
FOLATE SERPL-MCNC: 34 NG/ML (ref 4.78–24.2)
GFR SERPLBLD CREATININE-BSD FMLA CKD-EPI: 84 ML/MIN/{1.73_M2}
GLUCOSE SERPL-MCNC: 98 MG/DL (ref 70–99)
HBA1C MFR BLD: 4.6 %
HCT VFR BLD AUTO: 46.1 % (ref 36–48)
HDLC SERPL-MCNC: 60 MG/DL (ref 40–60)
HGB BLD-MCNC: 15.7 G/DL (ref 12–16)
LDLC SERPL CALC-MCNC: 48 MG/DL
LYMPHOCYTES # BLD: 2.1 K/UL (ref 1–5.1)
LYMPHOCYTES NFR BLD: 25.9 %
MCH RBC QN AUTO: 37.3 PG (ref 26–34)
MCHC RBC AUTO-ENTMCNC: 34.2 G/DL (ref 31–36)
MCV RBC AUTO: 109.1 FL (ref 80–100)
MONOCYTES # BLD: 0.9 K/UL (ref 0–1.3)
MONOCYTES NFR BLD: 11.7 %
NEUTROPHILS # BLD: 4.6 K/UL (ref 1.7–7.7)
NEUTROPHILS NFR BLD: 58.3 %
PLATELET # BLD AUTO: 134 K/UL (ref 135–450)
PMV BLD AUTO: 9 FL (ref 5–10.5)
POTASSIUM SERPL-SCNC: 3.9 MMOL/L (ref 3.5–5.1)
PROT SERPL-MCNC: 6.7 G/DL (ref 6.4–8.2)
RBC # BLD AUTO: 4.22 M/UL (ref 4–5.2)
SODIUM SERPL-SCNC: 141 MMOL/L (ref 136–145)
TRIGL SERPL-MCNC: 131 MG/DL (ref 0–150)
TSH SERPL DL<=0.005 MIU/L-ACNC: 0.4 UIU/ML (ref 0.27–4.2)
VIT B12 SERPL-MCNC: 1020 PG/ML (ref 211–911)
VLDLC SERPL CALC-MCNC: 26 MG/DL
WBC # BLD AUTO: 7.9 K/UL (ref 4–11)

## 2024-10-03 PROCEDURE — 83036 HEMOGLOBIN GLYCOSYLATED A1C: CPT

## 2024-10-03 PROCEDURE — 84443 ASSAY THYROID STIM HORMONE: CPT

## 2024-10-03 PROCEDURE — 85025 COMPLETE CBC W/AUTO DIFF WBC: CPT

## 2024-10-03 PROCEDURE — 82746 ASSAY OF FOLIC ACID SERUM: CPT

## 2024-10-03 PROCEDURE — 80061 LIPID PANEL: CPT

## 2024-10-03 PROCEDURE — 82977 ASSAY OF GGT: CPT

## 2024-10-03 PROCEDURE — 80053 COMPREHEN METABOLIC PANEL: CPT

## 2024-10-03 PROCEDURE — 82607 VITAMIN B-12: CPT

## 2024-10-04 DIAGNOSIS — D75.89 MACROCYTOSIS: ICD-10-CM

## 2024-10-04 DIAGNOSIS — D75.89 MACROCYTOSIS: Primary | ICD-10-CM

## 2024-10-04 LAB
GGT SERPL-CCNC: 177 U/L (ref 5–36)
PATH INTERP BLD-IMP: NORMAL

## 2024-10-07 LAB — PATH INTERP BLD-IMP: NORMAL

## 2024-10-24 ENCOUNTER — OFFICE VISIT (OUTPATIENT)
Dept: INTERNAL MEDICINE CLINIC | Age: 84
End: 2024-10-24

## 2024-10-24 VITALS
SYSTOLIC BLOOD PRESSURE: 124 MMHG | BODY MASS INDEX: 31.03 KG/M2 | OXYGEN SATURATION: 98 % | WEIGHT: 168.6 LBS | DIASTOLIC BLOOD PRESSURE: 72 MMHG | HEIGHT: 62 IN | HEART RATE: 74 BPM | TEMPERATURE: 97 F

## 2024-10-24 DIAGNOSIS — E66.811 CLASS 1 OBESITY DUE TO EXCESS CALORIES WITHOUT SERIOUS COMORBIDITY WITH BODY MASS INDEX (BMI) OF 31.0 TO 31.9 IN ADULT: ICD-10-CM

## 2024-10-24 DIAGNOSIS — Z23 NEED FOR INFLUENZA VACCINATION: Primary | ICD-10-CM

## 2024-10-24 DIAGNOSIS — F41.9 ANXIETY: ICD-10-CM

## 2024-10-24 DIAGNOSIS — E78.2 MIXED HYPERLIPIDEMIA: ICD-10-CM

## 2024-10-24 DIAGNOSIS — D69.6 THROMBOCYTOPENIA (HCC): ICD-10-CM

## 2024-10-24 DIAGNOSIS — I50.32 CHRONIC DIASTOLIC HF (HEART FAILURE) (HCC): ICD-10-CM

## 2024-10-24 DIAGNOSIS — I10 ESSENTIAL HYPERTENSION: ICD-10-CM

## 2024-10-24 DIAGNOSIS — E03.9 HYPOTHYROIDISM, UNSPECIFIED TYPE: ICD-10-CM

## 2024-10-24 DIAGNOSIS — R73.01 IFG (IMPAIRED FASTING GLUCOSE): ICD-10-CM

## 2024-10-24 DIAGNOSIS — Z00.00 WELL ADULT EXAM: ICD-10-CM

## 2024-10-24 DIAGNOSIS — I10 ESSENTIAL (PRIMARY) HYPERTENSION: ICD-10-CM

## 2024-10-24 DIAGNOSIS — E66.09 CLASS 1 OBESITY DUE TO EXCESS CALORIES WITHOUT SERIOUS COMORBIDITY WITH BODY MASS INDEX (BMI) OF 31.0 TO 31.9 IN ADULT: ICD-10-CM

## 2024-10-24 PROBLEM — R42 ORTHOSTATIC LIGHTHEADEDNESS: Status: RESOLVED | Noted: 2021-10-18 | Resolved: 2024-10-24

## 2024-10-24 RX ORDER — METOPROLOL SUCCINATE 50 MG/1
TABLET, EXTENDED RELEASE ORAL
Qty: 90 TABLET | Refills: 3 | Status: SHIPPED | OUTPATIENT
Start: 2024-10-24

## 2024-10-24 RX ORDER — BUSPIRONE HYDROCHLORIDE 5 MG/1
5 TABLET ORAL 2 TIMES DAILY
Qty: 60 TABLET | Refills: 2 | Status: SHIPPED | OUTPATIENT
Start: 2024-10-24 | End: 2025-01-22

## 2024-10-24 RX ORDER — LEVOTHYROXINE SODIUM 125 UG/1
TABLET ORAL
Qty: 90 TABLET | Refills: 3 | Status: SHIPPED | OUTPATIENT
Start: 2024-10-24

## 2024-10-24 ASSESSMENT — ENCOUNTER SYMPTOMS
VOMITING: 0
ABDOMINAL DISTENTION: 0
DIARRHEA: 0
CONSTIPATION: 0
SHORTNESS OF BREATH: 0
NAUSEA: 0
CHEST TIGHTNESS: 0

## 2024-10-24 NOTE — PROGRESS NOTES
stable. Continue current regimen. Encouraged decreasing sodium in the diet, weight loss, and gradual increases in exercise at least 20 minutes daily to further benefit BP    Hypothyroidism, unspecified type  -     levothyroxine (SYNTHROID) 125 MCG tablet; TAKE 1 TABLET BY MOUTH DAILY FOR THYROID    Recent TSH normal. Continue current regimen.     Well adult exam  -     CBC with Auto Differential; Future  -     Comprehensive Metabolic Panel; Future  -     Lipid Panel; Future  -     Hemoglobin A1C; Future  -     TSH with Reflex; Future    Thrombocytopenia (HCC)    Continues with Dr. Morse. CBC shows slight increase in MCV however averaging stable. No notable abnormal cells on smear/flow cytometry.     IFG (impaired fasting glucose)    Discussed the pathophysiology of DM and its progressive downward trend  Reviewed the need to focus on portion control, eating across the food groups    Encouraged dietary improvement/monitoring to include less simple carbs (candies, desserts, breads/pastas) and emphasis on healthier carbs like fruits (berries) and sources of whole grain to prevent fluctuations in glucose. All of these should still be consumed in moderation, however. Exercise can help utilize excess glucose additionally and can help to lose excess weight.  Encouraged updating visual screenings. Monitor for changes to feet (nonhealing wounds, sensation changes).     Class 1 obesity due to excess calories without serious comorbidity with body mass index (BMI) of 31.0 to 31.9 in adult    Recommend daily exercise to include mixed varieties of weight bearing/strength training and aerobic activities to maximize metabolic potential.     Key dietary points:     - Increase one's lean protein intake in place of starchy/carb rich foods. Meats (preferably organic or grass fed) are great sources of protein and have no carbohydrates.  - Recommend coconut, olive, avocado, or almond oils.  - Choose vegetables that grow above ground as

## 2025-03-31 RX ORDER — ATORVASTATIN CALCIUM 40 MG/1
TABLET, FILM COATED ORAL
Qty: 45 TABLET | Refills: 1 | Status: SHIPPED | OUTPATIENT
Start: 2025-03-31

## 2025-03-31 NOTE — TELEPHONE ENCOUNTER
Refill request for ATORVASTATIN 40MG TABLETS medication.     Name of [facility]- [facility]      Last visit - 10-     Pending visit - 5-8-2025    Last refill - 12-4-2024      Medication Contract signed -   Last Oarrs ran-         Additional Comments

## 2025-04-17 DIAGNOSIS — I10 ESSENTIAL HYPERTENSION: ICD-10-CM

## 2025-04-17 DIAGNOSIS — E03.9 HYPOTHYROIDISM, UNSPECIFIED TYPE: ICD-10-CM

## 2025-04-17 DIAGNOSIS — Z00.00 WELL ADULT EXAM: ICD-10-CM

## 2025-04-17 DIAGNOSIS — E78.2 MIXED HYPERLIPIDEMIA: ICD-10-CM

## 2025-04-17 DIAGNOSIS — R73.01 IFG (IMPAIRED FASTING GLUCOSE): ICD-10-CM

## 2025-04-18 LAB
ALBUMIN SERPL-MCNC: 4 G/DL (ref 3.4–5)
ALBUMIN/GLOB SERPL: 1.8 {RATIO} (ref 1.1–2.2)
ALP SERPL-CCNC: 133 U/L (ref 40–129)
ALT SERPL-CCNC: 108 U/L (ref 10–40)
ANION GAP SERPL CALCULATED.3IONS-SCNC: 18 MMOL/L (ref 3–16)
AST SERPL-CCNC: 117 U/L (ref 15–37)
BASOPHILS # BLD: 0 K/UL (ref 0–0.2)
BASOPHILS NFR BLD: 0 %
BILIRUB SERPL-MCNC: 1.4 MG/DL (ref 0–1)
BUN SERPL-MCNC: 18 MG/DL (ref 7–20)
CALCIUM SERPL-MCNC: 9.7 MG/DL (ref 8.3–10.6)
CHLORIDE SERPL-SCNC: 102 MMOL/L (ref 99–110)
CHOLEST SERPL-MCNC: 105 MG/DL (ref 0–199)
CO2 SERPL-SCNC: 22 MMOL/L (ref 21–32)
CREAT SERPL-MCNC: 0.7 MG/DL (ref 0.6–1.2)
DEPRECATED RDW RBC AUTO: 12.2 % (ref 12.4–15.4)
EOSINOPHIL # BLD: 0 K/UL (ref 0–0.6)
EOSINOPHIL NFR BLD: 0 %
EST. AVERAGE GLUCOSE BLD GHB EST-MCNC: 85.3 MG/DL
GFR SERPLBLD CREATININE-BSD FMLA CKD-EPI: 84 ML/MIN/{1.73_M2}
GLUCOSE SERPL-MCNC: 72 MG/DL (ref 70–99)
HBA1C MFR BLD: 4.6 %
HCT VFR BLD AUTO: 44.2 % (ref 36–48)
HDLC SERPL-MCNC: 53 MG/DL (ref 40–60)
HGB BLD-MCNC: 15.1 G/DL (ref 12–16)
LDLC SERPL CALC-MCNC: 32 MG/DL
LYMPHOCYTES # BLD: 1.9 K/UL (ref 1–5.1)
LYMPHOCYTES NFR BLD: 12 %
MACROCYTES BLD QL SMEAR: ABNORMAL
MCH RBC QN AUTO: 36.9 PG (ref 26–34)
MCHC RBC AUTO-ENTMCNC: 34.3 G/DL (ref 31–36)
MCV RBC AUTO: 107.7 FL (ref 80–100)
MONOCYTES # BLD: 0.8 K/UL (ref 0–1.3)
MONOCYTES NFR BLD: 5 %
NEUTROPHILS # BLD: 13.3 K/UL (ref 1.7–7.7)
NEUTROPHILS NFR BLD: 83 %
NEUTS VAC BLD QL SMEAR: PRESENT
PATH INTERP BLD-IMP: NO
PLATELET # BLD AUTO: 149 K/UL (ref 135–450)
PMV BLD AUTO: 9.2 FL (ref 5–10.5)
POTASSIUM SERPL-SCNC: 3.6 MMOL/L (ref 3.5–5.1)
PROT SERPL-MCNC: 6.2 G/DL (ref 6.4–8.2)
RBC # BLD AUTO: 4.1 M/UL (ref 4–5.2)
SODIUM SERPL-SCNC: 142 MMOL/L (ref 136–145)
T4 FREE SERPL-MCNC: 2 NG/DL (ref 0.9–1.8)
TRIGL SERPL-MCNC: 98 MG/DL (ref 0–150)
TSH SERPL DL<=0.005 MIU/L-ACNC: 0.23 UIU/ML (ref 0.27–4.2)
VLDLC SERPL CALC-MCNC: 20 MG/DL
WBC # BLD AUTO: 16 K/UL (ref 4–11)

## 2025-04-21 ENCOUNTER — RESULTS FOLLOW-UP (OUTPATIENT)
Dept: INTERNAL MEDICINE CLINIC | Age: 85
End: 2025-04-21

## 2025-04-21 DIAGNOSIS — E03.9 HYPOTHYROIDISM, UNSPECIFIED TYPE: ICD-10-CM

## 2025-04-21 DIAGNOSIS — E03.9 HYPOTHYROIDISM, UNSPECIFIED TYPE: Primary | ICD-10-CM

## 2025-04-21 RX ORDER — LEVOTHYROXINE SODIUM 100 UG/1
TABLET ORAL
Qty: 90 TABLET | Refills: 0 | Status: SHIPPED | OUTPATIENT
Start: 2025-04-21

## 2025-05-08 ENCOUNTER — OFFICE VISIT (OUTPATIENT)
Dept: INTERNAL MEDICINE CLINIC | Age: 85
End: 2025-05-08

## 2025-05-08 VITALS
DIASTOLIC BLOOD PRESSURE: 88 MMHG | OXYGEN SATURATION: 98 % | WEIGHT: 164 LBS | TEMPERATURE: 97.7 F | HEIGHT: 62 IN | HEART RATE: 67 BPM | SYSTOLIC BLOOD PRESSURE: 138 MMHG | BODY MASS INDEX: 30.18 KG/M2

## 2025-05-08 DIAGNOSIS — E78.2 MIXED HYPERLIPIDEMIA: ICD-10-CM

## 2025-05-08 DIAGNOSIS — Z00.00 MEDICARE ANNUAL WELLNESS VISIT, SUBSEQUENT: ICD-10-CM

## 2025-05-08 DIAGNOSIS — K76.0 FATTY LIVER: ICD-10-CM

## 2025-05-08 DIAGNOSIS — I50.32 CHRONIC DIASTOLIC HF (HEART FAILURE) (HCC): ICD-10-CM

## 2025-05-08 DIAGNOSIS — I10 ESSENTIAL (PRIMARY) HYPERTENSION: ICD-10-CM

## 2025-05-08 DIAGNOSIS — F41.9 ANXIETY: ICD-10-CM

## 2025-05-08 DIAGNOSIS — Z13.31 POSITIVE DEPRESSION SCREENING: ICD-10-CM

## 2025-05-08 DIAGNOSIS — H04.129 DRY EYE: Primary | ICD-10-CM

## 2025-05-08 DIAGNOSIS — E03.9 HYPOTHYROIDISM, UNSPECIFIED TYPE: ICD-10-CM

## 2025-05-08 RX ORDER — ETHYL ALCOHOL 700 MG/ML
GEL TOPICAL
Qty: 3.5 G | Refills: 0 | Status: SHIPPED | OUTPATIENT
Start: 2025-05-08

## 2025-05-08 RX ORDER — BUSPIRONE HYDROCHLORIDE 5 MG/1
5 TABLET ORAL PRN
COMMUNITY

## 2025-05-08 RX ORDER — BUSPIRONE HYDROCHLORIDE 5 MG/1
5 TABLET ORAL 2 TIMES DAILY
Qty: 60 TABLET | Refills: 0 | Status: SHIPPED | OUTPATIENT
Start: 2025-05-08 | End: 2025-08-06

## 2025-05-08 SDOH — ECONOMIC STABILITY: FOOD INSECURITY: WITHIN THE PAST 12 MONTHS, THE FOOD YOU BOUGHT JUST DIDN'T LAST AND YOU DIDN'T HAVE MONEY TO GET MORE.: NEVER TRUE

## 2025-05-08 SDOH — ECONOMIC STABILITY: FOOD INSECURITY: WITHIN THE PAST 12 MONTHS, YOU WORRIED THAT YOUR FOOD WOULD RUN OUT BEFORE YOU GOT MONEY TO BUY MORE.: NEVER TRUE

## 2025-05-08 ASSESSMENT — LIFESTYLE VARIABLES
HOW OFTEN DO YOU HAVE A DRINK CONTAINING ALCOHOL: NEVER
HOW MANY STANDARD DRINKS CONTAINING ALCOHOL DO YOU HAVE ON A TYPICAL DAY: PATIENT DOES NOT DRINK

## 2025-05-08 ASSESSMENT — PATIENT HEALTH QUESTIONNAIRE - PHQ9
10. IF YOU CHECKED OFF ANY PROBLEMS, HOW DIFFICULT HAVE THESE PROBLEMS MADE IT FOR YOU TO DO YOUR WORK, TAKE CARE OF THINGS AT HOME, OR GET ALONG WITH OTHER PEOPLE: SOMEWHAT DIFFICULT
SUM OF ALL RESPONSES TO PHQ QUESTIONS 1-9: 18
1. LITTLE INTEREST OR PLEASURE IN DOING THINGS: NEARLY EVERY DAY
6. FEELING BAD ABOUT YOURSELF - OR THAT YOU ARE A FAILURE OR HAVE LET YOURSELF OR YOUR FAMILY DOWN: NOT AT ALL
2. FEELING DOWN, DEPRESSED OR HOPELESS: NEARLY EVERY DAY
SUM OF ALL RESPONSES TO PHQ QUESTIONS 1-9: 18
SUM OF ALL RESPONSES TO PHQ QUESTIONS 1-9: 18
5. POOR APPETITE OR OVEREATING: NEARLY EVERY DAY
3. TROUBLE FALLING OR STAYING ASLEEP: NOT AT ALL
9. THOUGHTS THAT YOU WOULD BE BETTER OFF DEAD, OR OF HURTING YOURSELF: NOT AT ALL
8. MOVING OR SPEAKING SO SLOWLY THAT OTHER PEOPLE COULD HAVE NOTICED. OR THE OPPOSITE, BEING SO FIGETY OR RESTLESS THAT YOU HAVE BEEN MOVING AROUND A LOT MORE THAN USUAL: NEARLY EVERY DAY
4. FEELING TIRED OR HAVING LITTLE ENERGY: NEARLY EVERY DAY
7. TROUBLE CONCENTRATING ON THINGS, SUCH AS READING THE NEWSPAPER OR WATCHING TELEVISION: NEARLY EVERY DAY
SUM OF ALL RESPONSES TO PHQ QUESTIONS 1-9: 18
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
## 2025-05-08 NOTE — PATIENT INSTRUCTIONS
device in the bathroom with you.   Where can you learn more?  Go to https://www.[employer].net/patientEd and enter G117 to learn more about \"Preventing Falls: Care Instructions.\"  Current as of: July 31, 2024  Content Version: 14.4  © 5315-8075 [employer].   Care instructions adapted under license by [employer]. If you have questions about a medical condition or this instruction, always ask your healthcare professional. [employer], disclaims any warranty or liability for your use of this information.         Learning About Mindfulness for Stress  What are mindfulness and stress?     Stress is your body's response to a hard situation. Your body can have a physical, emotional, or mental response. A lot of things can cause stress. You may feel stress when you go on a job interview, take a test, or run a race. This kind of short-term stress is normal and even useful. It can help you if you need to work hard or react quickly.  Stress also can last a long time. Long-term stress is caused by stressful situations or events. Examples of long-term stress include long-term health problems, ongoing problems at work, and conflicts in your family. Long-term stress can harm your health.  Mindfulness is a focus only on things happening in the present moment. It's a process of purposefully paying attention to and being aware of your surroundings, your emotions, your thoughts, and how your body feels. You are aware of these things, but you aren't judging these experiences as \"good\" or \"bad.\" Mindfulness can help you learn to calm your mind and body to help you cope with illness, pain, and stress.  How does mindfulness help to relieve stress?  Mindfulness can help quiet your mind and relax your body. Studies show that it can help some people sleep better, feel less anxious, and bring their blood pressure down. And it's been shown to help some people live and cope better with certain health problems like heart

## 2025-05-29 DIAGNOSIS — I50.32 CHRONIC DIASTOLIC HF (HEART FAILURE) (HCC): ICD-10-CM

## 2025-05-29 DIAGNOSIS — E78.2 MIXED HYPERLIPIDEMIA: ICD-10-CM

## 2025-05-29 DIAGNOSIS — K76.0 FATTY LIVER: ICD-10-CM

## 2025-05-29 DIAGNOSIS — I10 ESSENTIAL (PRIMARY) HYPERTENSION: ICD-10-CM

## 2025-05-29 DIAGNOSIS — E03.9 HYPOTHYROIDISM, UNSPECIFIED TYPE: ICD-10-CM

## 2025-05-30 ENCOUNTER — RESULTS FOLLOW-UP (OUTPATIENT)
Dept: INTERNAL MEDICINE CLINIC | Age: 85
End: 2025-05-30

## 2025-05-30 DIAGNOSIS — E03.9 HYPOTHYROIDISM, UNSPECIFIED TYPE: ICD-10-CM

## 2025-05-30 DIAGNOSIS — E05.90 SUBCLINICAL HYPERTHYROIDISM: Primary | ICD-10-CM

## 2025-05-30 LAB
ALBUMIN SERPL-MCNC: 4.3 G/DL (ref 3.4–5)
ALBUMIN/GLOB SERPL: 1.8 {RATIO} (ref 1.1–2.2)
ALP SERPL-CCNC: 160 U/L (ref 40–129)
ALT SERPL-CCNC: 92 U/L (ref 10–40)
ANION GAP SERPL CALCULATED.3IONS-SCNC: 12 MMOL/L (ref 3–16)
AST SERPL-CCNC: 78 U/L (ref 15–37)
BILIRUB SERPL-MCNC: 0.7 MG/DL (ref 0–1)
BUN SERPL-MCNC: 12 MG/DL (ref 7–20)
CALCIUM SERPL-MCNC: 9.7 MG/DL (ref 8.3–10.6)
CHLORIDE SERPL-SCNC: 106 MMOL/L (ref 99–110)
CHOLEST SERPL-MCNC: 142 MG/DL (ref 0–199)
CO2 SERPL-SCNC: 26 MMOL/L (ref 21–32)
CREAT SERPL-MCNC: 0.7 MG/DL (ref 0.6–1.2)
EST. AVERAGE GLUCOSE BLD GHB EST-MCNC: 79.6 MG/DL
GFR SERPLBLD CREATININE-BSD FMLA CKD-EPI: 84 ML/MIN/{1.73_M2}
GLUCOSE SERPL-MCNC: 96 MG/DL (ref 70–99)
HBA1C MFR BLD: 4.4 %
HDLC SERPL-MCNC: 57 MG/DL (ref 40–60)
LDLC SERPL CALC-MCNC: 54 MG/DL
PHOSPHATE SERPL-MCNC: 3.2 MG/DL (ref 2.5–4.9)
POTASSIUM SERPL-SCNC: 4 MMOL/L (ref 3.5–5.1)
PROT SERPL-MCNC: 6.7 G/DL (ref 6.4–8.2)
SODIUM SERPL-SCNC: 144 MMOL/L (ref 136–145)
T4 FREE SERPL-MCNC: 1.5 NG/DL (ref 0.9–1.8)
TRIGL SERPL-MCNC: 155 MG/DL (ref 0–150)
TSH SERPL DL<=0.005 MIU/L-ACNC: 0.15 UIU/ML (ref 0.27–4.2)
VLDLC SERPL CALC-MCNC: 31 MG/DL

## 2025-05-30 RX ORDER — LEVOTHYROXINE SODIUM 75 UG/1
TABLET ORAL
Qty: 30 TABLET | Refills: 0 | Status: SHIPPED | OUTPATIENT
Start: 2025-05-30

## 2025-06-25 DIAGNOSIS — E05.90 HYPERTHYROIDISM: Primary | ICD-10-CM

## 2025-06-26 ENCOUNTER — HOSPITAL ENCOUNTER (OUTPATIENT)
Dept: LAB | Age: 85
Discharge: HOME | End: 2025-06-26
Payer: MEDICARE

## 2025-06-26 ENCOUNTER — TELEPHONE (OUTPATIENT)
Dept: INTERNAL MEDICINE CLINIC | Age: 85
End: 2025-06-26

## 2025-06-26 ENCOUNTER — HOSPITAL ENCOUNTER (OUTPATIENT)
Dept: ULTRASOUND IMAGING | Age: 85
Discharge: HOME | End: 2025-06-26
Payer: MEDICARE

## 2025-06-26 DIAGNOSIS — E05.90 SUBCLINICAL HYPERTHYROIDISM: ICD-10-CM

## 2025-06-26 DIAGNOSIS — E03.9 HYPOTHYROIDISM, UNSPECIFIED TYPE: ICD-10-CM

## 2025-06-26 DIAGNOSIS — E05.90 HYPERTHYROIDISM: ICD-10-CM

## 2025-06-26 LAB
T3FREE SERPL-MCNC: 2.2 PG/ML (ref 2.3–4.2)
T4 FREE SERPL-MCNC: 1.3 NG/DL (ref 0.9–1.8)
TSH SERPL DL<=0.005 MIU/L-ACNC: 0.58 UIU/ML (ref 0.27–4.2)

## 2025-06-26 PROCEDURE — 84439 ASSAY OF FREE THYROXINE: CPT

## 2025-06-26 PROCEDURE — 84481 FREE ASSAY (FT-3): CPT

## 2025-06-26 PROCEDURE — 84443 ASSAY THYROID STIM HORMONE: CPT

## 2025-06-26 PROCEDURE — 76536 US EXAM OF HEAD AND NECK: CPT

## 2025-06-26 PROCEDURE — 36415 COLL VENOUS BLD VENIPUNCTURE: CPT

## 2025-06-26 PROCEDURE — 86376 MICROSOMAL ANTIBODY EACH: CPT

## 2025-06-26 NOTE — TELEPHONE ENCOUNTER
The US tech wanted to know if we had any updates to patient's thyroidectomy.  Patient states she had a partial thyroidectomy in 1968.  The chart states a complete thyroidectomy in 1968.  The tech stated she saw nothing on the right and a small area on the left which would be consistent with a partial with left removal.     FYI

## 2025-06-27 ENCOUNTER — RESULTS FOLLOW-UP (OUTPATIENT)
Dept: INTERNAL MEDICINE CLINIC | Age: 85
End: 2025-06-27

## 2025-06-27 DIAGNOSIS — E03.9 HYPOTHYROIDISM, UNSPECIFIED TYPE: ICD-10-CM

## 2025-06-27 DIAGNOSIS — F41.9 ANXIETY: ICD-10-CM

## 2025-06-27 DIAGNOSIS — E05.90 SUBCLINICAL HYPERTHYROIDISM: ICD-10-CM

## 2025-06-27 LAB — THYROPEROXIDASE AB SERPL IA-ACNC: <9 IU/ML

## 2025-06-27 RX ORDER — LEVOTHYROXINE SODIUM 75 UG/1
TABLET ORAL
Qty: 90 TABLET | Refills: 1 | Status: SHIPPED | OUTPATIENT
Start: 2025-06-27

## 2025-06-27 RX ORDER — ATORVASTATIN CALCIUM 40 MG/1
TABLET, FILM COATED ORAL
Qty: 45 TABLET | Refills: 1 | Status: SHIPPED | OUTPATIENT
Start: 2025-06-27

## 2025-06-27 RX ORDER — BUSPIRONE HYDROCHLORIDE 5 MG/1
5 TABLET ORAL 2 TIMES DAILY
Qty: 180 TABLET | Refills: 1 | Status: SHIPPED | OUTPATIENT
Start: 2025-06-27 | End: 2025-09-25

## 2025-06-27 NOTE — TELEPHONE ENCOUNTER
Additional Comments  PATIENT REQUESTING 90 DAY SUPPLY OF EACH PENDED                 Refill request for  medication.     ATORVASTATIN 40 MG   BUSPIRONE 5 MG   LEVOTHYROXINE 75 MCG     Name of Pharmacy- [facility]       Last visit - 05/08/2025     Pending visit - 10/09/2025    Last refill -03/31/2025; 05/08/2025; 05/30/2025

## 2025-06-27 NOTE — TELEPHONE ENCOUNTER
If patient states she had a partial thyroidectomy with US confirmation I would say she had a partial thyroidectomy. Her chart actually wasn't updated with either partial or total thyroidectomy however I was obtaining an US to evaluate for any active lesions/changes to the thyroid seeing she had a suppressed TSH

## (undated) DEVICE — STERILE POLYISOPRENE POWDER-FREE SURGICAL GLOVES: Brand: PROTEXIS

## (undated) DEVICE — ELECTRODE,ECG,STRESS,FOAM,3PK: Brand: MEDLINE

## (undated) DEVICE — GAUZE,SPONGE,4"X4",16PLY,STRL,LF,10/TRAY: Brand: MEDLINE

## (undated) DEVICE — TOWEL OR BLUEE 16X26IN ST 8 PACK ORB08 16X26ORTWL

## (undated) DEVICE — ALCOHOL RUBBING 16OZ 70% ISO

## (undated) DEVICE — UNIVERSAL BLOCK TRAY: Brand: MEDLINE INDUSTRIES, INC.

## (undated) DEVICE — MASK CAPNOGRAPHY AD W35IN DIA58IN SAMP LN L10FT O2 LN

## (undated) DEVICE — CANNULA NSL AD TBNG L7FT PVC STR NONFLARED PRNG O2 DEL W STD

## (undated) DEVICE — ENDOSCOPIC KIT 2 12 FT OP4 DE2 GWN SYR

## (undated) DEVICE — CONMED SCOPE SAVER BITE BLOCK, 20X27 MM: Brand: SCOPE SAVER

## (undated) DEVICE — CHLORAPREP 26ML ORANGE